# Patient Record
Sex: MALE | Race: BLACK OR AFRICAN AMERICAN | Employment: FULL TIME | URBAN - METROPOLITAN AREA
[De-identification: names, ages, dates, MRNs, and addresses within clinical notes are randomized per-mention and may not be internally consistent; named-entity substitution may affect disease eponyms.]

---

## 2017-06-26 ENCOUNTER — GENERIC CONVERSION - ENCOUNTER (OUTPATIENT)
Dept: OTHER | Facility: OTHER | Age: 57
End: 2017-06-26

## 2017-06-30 ENCOUNTER — GENERIC CONVERSION - ENCOUNTER (OUTPATIENT)
Dept: OTHER | Facility: OTHER | Age: 57
End: 2017-06-30

## 2017-07-20 ENCOUNTER — GENERIC CONVERSION - ENCOUNTER (OUTPATIENT)
Dept: OTHER | Facility: OTHER | Age: 57
End: 2017-07-20

## 2017-08-31 ENCOUNTER — APPOINTMENT (OUTPATIENT)
Dept: PHYSICAL THERAPY | Facility: MEDICAL CENTER | Age: 57
End: 2017-08-31
Payer: COMMERCIAL

## 2017-08-31 PROCEDURE — 97140 MANUAL THERAPY 1/> REGIONS: CPT

## 2017-08-31 PROCEDURE — 97161 PT EVAL LOW COMPLEX 20 MIN: CPT

## 2017-08-31 PROCEDURE — G8991 OTHER PT/OT GOAL STATUS: HCPCS | Performed by: PHYSICAL THERAPIST

## 2017-08-31 PROCEDURE — G8990 OTHER PT/OT CURRENT STATUS: HCPCS | Performed by: PHYSICAL THERAPIST

## 2017-09-05 ENCOUNTER — APPOINTMENT (OUTPATIENT)
Dept: PHYSICAL THERAPY | Facility: MEDICAL CENTER | Age: 57
End: 2017-09-05
Payer: COMMERCIAL

## 2017-09-05 PROCEDURE — 97110 THERAPEUTIC EXERCISES: CPT

## 2017-09-05 PROCEDURE — 97140 MANUAL THERAPY 1/> REGIONS: CPT

## 2017-09-07 ENCOUNTER — APPOINTMENT (OUTPATIENT)
Dept: PHYSICAL THERAPY | Facility: MEDICAL CENTER | Age: 57
End: 2017-09-07
Payer: COMMERCIAL

## 2017-09-07 PROCEDURE — 97140 MANUAL THERAPY 1/> REGIONS: CPT

## 2017-09-07 PROCEDURE — 97110 THERAPEUTIC EXERCISES: CPT

## 2017-09-11 ENCOUNTER — APPOINTMENT (OUTPATIENT)
Dept: PHYSICAL THERAPY | Facility: MEDICAL CENTER | Age: 57
End: 2017-09-11
Payer: COMMERCIAL

## 2017-09-11 PROCEDURE — 97110 THERAPEUTIC EXERCISES: CPT

## 2017-09-11 PROCEDURE — 97140 MANUAL THERAPY 1/> REGIONS: CPT

## 2017-09-14 ENCOUNTER — APPOINTMENT (OUTPATIENT)
Dept: PHYSICAL THERAPY | Facility: MEDICAL CENTER | Age: 57
End: 2017-09-14
Payer: COMMERCIAL

## 2017-09-14 PROCEDURE — 97110 THERAPEUTIC EXERCISES: CPT

## 2017-09-14 PROCEDURE — 97140 MANUAL THERAPY 1/> REGIONS: CPT

## 2017-09-18 ENCOUNTER — APPOINTMENT (OUTPATIENT)
Dept: PHYSICAL THERAPY | Facility: MEDICAL CENTER | Age: 57
End: 2017-09-18
Payer: COMMERCIAL

## 2017-09-18 PROCEDURE — 97110 THERAPEUTIC EXERCISES: CPT

## 2017-09-18 PROCEDURE — 97140 MANUAL THERAPY 1/> REGIONS: CPT

## 2017-09-21 ENCOUNTER — APPOINTMENT (OUTPATIENT)
Dept: PHYSICAL THERAPY | Facility: MEDICAL CENTER | Age: 57
End: 2017-09-21
Payer: COMMERCIAL

## 2017-09-25 ENCOUNTER — APPOINTMENT (OUTPATIENT)
Dept: PHYSICAL THERAPY | Facility: MEDICAL CENTER | Age: 57
End: 2017-09-25
Payer: COMMERCIAL

## 2017-09-28 ENCOUNTER — APPOINTMENT (OUTPATIENT)
Dept: PHYSICAL THERAPY | Facility: MEDICAL CENTER | Age: 57
End: 2017-09-28
Payer: COMMERCIAL

## 2017-10-13 ENCOUNTER — GENERIC CONVERSION - ENCOUNTER (OUTPATIENT)
Dept: OTHER | Facility: OTHER | Age: 57
End: 2017-10-13

## 2017-12-28 ENCOUNTER — GENERIC CONVERSION - ENCOUNTER (OUTPATIENT)
Dept: OTHER | Facility: OTHER | Age: 57
End: 2017-12-28

## 2017-12-28 ENCOUNTER — ALLSCRIPTS OFFICE VISIT (OUTPATIENT)
Dept: OTHER | Facility: OTHER | Age: 57
End: 2017-12-28

## 2017-12-28 ENCOUNTER — TRANSCRIBE ORDERS (OUTPATIENT)
Dept: LAB | Facility: CLINIC | Age: 57
End: 2017-12-28

## 2017-12-28 ENCOUNTER — APPOINTMENT (OUTPATIENT)
Dept: LAB | Facility: CLINIC | Age: 57
End: 2017-12-28
Payer: COMMERCIAL

## 2017-12-28 DIAGNOSIS — I10 ESSENTIAL (PRIMARY) HYPERTENSION: ICD-10-CM

## 2017-12-28 LAB
ALBUMIN SERPL BCP-MCNC: 3.7 G/DL (ref 3.5–5)
ALP SERPL-CCNC: 109 U/L (ref 46–116)
ALT SERPL W P-5'-P-CCNC: 39 U/L (ref 12–78)
ANION GAP SERPL CALCULATED.3IONS-SCNC: 7 MMOL/L (ref 4–13)
AST SERPL W P-5'-P-CCNC: 28 U/L (ref 5–45)
BASOPHILS # BLD AUTO: 0.03 THOUSANDS/ΜL (ref 0–0.1)
BASOPHILS NFR BLD AUTO: 1 % (ref 0–1)
BILIRUB SERPL-MCNC: 0.38 MG/DL (ref 0.2–1)
BUN SERPL-MCNC: 9 MG/DL (ref 5–25)
CALCIUM SERPL-MCNC: 9 MG/DL (ref 8.3–10.1)
CHLORIDE SERPL-SCNC: 105 MMOL/L (ref 100–108)
CHOLEST SERPL-MCNC: 218 MG/DL (ref 50–200)
CO2 SERPL-SCNC: 29 MMOL/L (ref 21–32)
CREAT SERPL-MCNC: 1 MG/DL (ref 0.6–1.3)
EOSINOPHIL # BLD AUTO: 0.09 THOUSAND/ΜL (ref 0–0.61)
EOSINOPHIL NFR BLD AUTO: 2 % (ref 0–6)
ERYTHROCYTE [DISTWIDTH] IN BLOOD BY AUTOMATED COUNT: 15.2 % (ref 11.6–15.1)
GFR SERPL CREATININE-BSD FRML MDRD: 96 ML/MIN/1.73SQ M
GLUCOSE P FAST SERPL-MCNC: 94 MG/DL (ref 65–99)
HCT VFR BLD AUTO: 43.8 % (ref 36.5–49.3)
HDLC SERPL-MCNC: 68 MG/DL (ref 40–60)
HGB BLD-MCNC: 14.4 G/DL (ref 12–17)
LDLC SERPL CALC-MCNC: 133 MG/DL (ref 0–100)
LYMPHOCYTES # BLD AUTO: 2.48 THOUSANDS/ΜL (ref 0.6–4.47)
LYMPHOCYTES NFR BLD AUTO: 47 % (ref 14–44)
MCH RBC QN AUTO: 28.5 PG (ref 26.8–34.3)
MCHC RBC AUTO-ENTMCNC: 32.9 G/DL (ref 31.4–37.4)
MCV RBC AUTO: 87 FL (ref 82–98)
MONOCYTES # BLD AUTO: 0.7 THOUSAND/ΜL (ref 0.17–1.22)
MONOCYTES NFR BLD AUTO: 14 % (ref 4–12)
NEUTROPHILS # BLD AUTO: 1.82 THOUSANDS/ΜL (ref 1.85–7.62)
NEUTS SEG NFR BLD AUTO: 36 % (ref 43–75)
NRBC BLD AUTO-RTO: 0 /100 WBCS
PLATELET # BLD AUTO: 239 THOUSANDS/UL (ref 149–390)
PMV BLD AUTO: 9.7 FL (ref 8.9–12.7)
POTASSIUM SERPL-SCNC: 4.5 MMOL/L (ref 3.5–5.3)
PROT SERPL-MCNC: 8 G/DL (ref 6.4–8.2)
RBC # BLD AUTO: 5.06 MILLION/UL (ref 3.88–5.62)
SODIUM SERPL-SCNC: 141 MMOL/L (ref 136–145)
TRIGL SERPL-MCNC: 86 MG/DL
TSH SERPL DL<=0.05 MIU/L-ACNC: 1.56 UIU/ML (ref 0.36–3.74)
WBC # BLD AUTO: 5.13 THOUSAND/UL (ref 4.31–10.16)

## 2017-12-28 PROCEDURE — 80053 COMPREHEN METABOLIC PANEL: CPT

## 2017-12-28 PROCEDURE — 84443 ASSAY THYROID STIM HORMONE: CPT

## 2017-12-28 PROCEDURE — 80061 LIPID PANEL: CPT

## 2017-12-28 PROCEDURE — 85025 COMPLETE CBC W/AUTO DIFF WBC: CPT

## 2017-12-28 PROCEDURE — 36415 COLL VENOUS BLD VENIPUNCTURE: CPT

## 2017-12-29 NOTE — PROGRESS NOTES
Assessment   1  Hypertension, unspecified type (401 9) (I10)   2  Upper respiratory infection (465 9) (J06 9)   3  Never a smoker   4  Obesity, morbid, BMI 40 0-49 9 (278 01) (E66 01)    Discussion/Summary      1  Hypertension - I restarted the patient on amlodipine 5 milligrams daily  He was encouraged to continue checking his blood pressure at home and to bring his cuff to his next visit to verify its accuracy  He will return in about 3 weeks for his follow-up/physical  He is encouraged to call with any problems or concerns prior to that visit  We reviewed that he should limit his salt and caffeine intake  We also discussed trying to work on weight loss as this can be helpful for blood pressure control as well  He was provided a handout for an 1800 calorie diet to try to follow  Upper respiratory infection -patient was given a script for Flonase to start using 2 sprays per nostril once a day  He should increase fluids and rest  These are usually viral and will run their course in 7-10 days  He should call his symptoms worsen or fail to improve  Morbid obesity - encouraged to work on diet as above  (Exercise is not currently an option given his tendon tears in the right ankle  )  Possible side effects of new medications were reviewed with the patient/guardian today  The treatment plan was reviewed with the patient/guardian  The patient/guardian understands and agrees with the treatment plan      Chief Complaint   Followup hypertension  c/o sinus pressure and ear discomfort  History of Present Illness   The patient presents for follow-up of essential hypertension  The patient states he has been doing poorly with his blood pressure control since the last visit  Interval Events: notes that he had high BP but amlodipine was d/c'd 2 years ago - readings at home have been a little high for the last few months so he came in to possibly restart medication      Symptoms: denies dyspnea,-- denies chest pain-- and-- denies lower extremity edema--       The patient presents with complaints of worsened impaired vision (occasional blurry with reading)  Associated symptoms include no headache  Disease Management: the patient is doing well with his blood pressure goals  Additional History: notes that he cannot exercise since tore ligaments in the right ankle about a year ago - saw Dr Veto Alba and has PT - notes that he has flat feet too - notes that even walking hurts - has limited salt and has 2 coffees a day  Monroe Skiff presents with complaints of cold symptoms starting about 1 day ago  He is currently experiencing cold symptoms  Associated symptoms include nasal congestion,-- facial pressure-- and-- ear pain, but-- no runny nose,-- no post nasal drainage,-- no sore throat,-- no dry cough,-- no wheezing,-- no shortness of breath,-- no nausea,-- no vomiting,-- no diarrhea,-- no fever-- and-- no chills  Review of Systems        Constitutional: no fever-- and-- no chills  ENT: earache-- and-- nasal congestion, but-- no sore throat  Cardiovascular: no chest pain,-- no palpitations-- and-- no extremity edema  Respiratory: no shortness of breath,-- no cough-- and-- no wheezing  Gastrointestinal: no nausea,-- no vomiting-- and-- no diarrhea  Neurological: headache, but-- as noted in HPI,-- no dizziness-- and-- no fainting  Active Problems   1  Acute recurrent frontal sinusitis (461 1) (J01 11)   2  History of esophageal reflux (V12 79) (Z87 19)   3  Hypertension, unspecified type (401 9) (I10)   4  Special screening examination for neoplasm of prostate (V76 44) (Z12 5)   5  Tinea cruris (110 3) (B35 6)    Past Medical History   1  History of Ankle pain, left (719 47) (M25 572)   2  History of Blood pressure elevated (401 9) (I10)   3  History of Blood pressure elevated (401 9) (I10)   4  History of Contact dermatitis (692 9) (L25 9)   5   History of Diphtheria-tetanus-pertussis (DTP) vaccination (V06 1) (Z23)   6  History of Diverticulitis of colon (562 11) (K57 32)   7  History of Hematuria (599 70) (R31 9)   8  Hypertension, unspecified type (401 9) (I10)   9  History of Rotator cuff tendinitis, unspecified laterality (726 10) (M75 80)   10  History of Sebaceous cyst (706 2) (L72 3)   11  History of Sinusitis (473 9) (J32 9)   12  History of Swelling of joint, wrist, right (719 03) (M25 431)    Family History   Mother    1  Family history of Brain Cancer (V16 8)   2  Family history of Colon cancer   3  Family history of Mother  At Age ___  Father    3  Family history of Acute Myocardial Infarction (V17 3)   5  Family history of Father  At Age ___  Sister    10  Family history of Family Health Status Siblings ___ Living [facility] [facility] [address]    7  Family history of Colon cancer   8  Family history of Hypertension    Social History    · Being A Social Drinker   · Never a smoker    Current Meds    1  Fluticasone Propionate 50 MCG/ACT Nasal Suspension; use 2 sprays in each nostril     once daily; Therapy: 85MTH3683 to (Evaluate:2016)  Requested for: 2016; Last     Rx:59Vrs4100 Ordered   2  Naproxen 500 MG Oral Tablet; take 1 BID PRN  Requested for: 2016; Last     Rx:2016 Ordered   3  Nystatin 017842 UNIT/GM External Powder; Apply QID prn; Therapy: 37VFF1246 to (Evaluate:2016)  Requested for: 2016; Last     Rx:2016 Ordered   4  Pantoprazole Sodium 20 MG Oral Tablet Delayed Release; TAKE 1 TABLET DAILY; Therapy: 09TBX3202 to [phone])  Requested for: 2015; Last     Rx:2015 Ordered    Allergies   1   CeleBREX CAPS    Vitals   Vital Signs    Recorded: 72GKO9743 11:01AM Recorded: 45CXF1716 10:34AM   Temperature  97 3 F   Heart Rate  68   Systolic 660 124   Diastolic 013 834   Height  5 ft 9 5 in   Weight  275 lb 2 oz   BMI Calculated  40 05   BSA Calculated  2 38     Physical Exam Constitutional      General appearance: Abnormal   morbidly obese  Head and Face      Head and face: Normal        Palpation of the face and sinuses: Abnormal   Examination of the Sinuses: right maxillary tenderness-- and-- left maxillary tenderness, but-- non-tender right frontal-- and-- non-tender left frontal       Ears, Nose, Mouth, and Throat      External inspection of ears and nose: Normal        Otoscopic examination: Tympanic membranes translucent with normal light reflex  Canals patent without erythema  Hearing: Normal        Nasal mucosa, septum, and turbinates: Abnormal  -- Swollen turbinates bilaterally (worse on the right)  Lips, teeth, and gums: Normal, good dentition  Oropharynx: Normal with no erythema, edema, exudate or lesions  Neck      Neck: Supple, symmetric, trachea midline, no masses  Thyroid: Normal, no thyromegaly  Pulmonary      Respiratory effort: No increased work of breathing or signs of respiratory distress  Auscultation of lungs: Clear to auscultation  Cardiovascular      Auscultation of heart: Normal rate and rhythm, normal S1 and S2, no murmurs  Peripheral vascular exam: Normal   Carotid: no bruit on the right-- and-- no bruit on the left  Radial: right 2+-- and-- left 2+  Posterior tibialis: right 2+-- and-- left 2+  Examination of extremities for edema and/or varicosities: Normal   no edema  Lymphatic      Palpation of lymph nodes in neck: No lymphadenopathy  Musculoskeletal      Gait and station: Abnormal  -- brace on the right ankle  Psychiatric      Mood and affect: Normal        Results/Data   PHQ-2 Adult Depression Screening 96Nhp7078 10:38AM User, Ahs      Test Name Result Flag Reference   PHQ-2 Adult Depression Score 0     Over the last two weeks, how often have you been bothered by any of the following problems?      Little interest or pleasure in doing things: Not at all - 0     Feeling down, depressed, or hopeless: Not at all - 0   PHQ-2 Adult Depression Screening Negative          Signatures    Electronically signed by : Prakash [facility]; Dec 28 2017 11:16AM EST                       (Author)     Electronically signed by : DRU Paris ; Dec 28 2017  5:39PM EST

## 2018-01-19 ENCOUNTER — ALLSCRIPTS OFFICE VISIT (OUTPATIENT)
Dept: OTHER | Facility: OTHER | Age: 58
End: 2018-01-19

## 2018-01-23 VITALS
SYSTOLIC BLOOD PRESSURE: 170 MMHG | WEIGHT: 275.13 LBS | HEART RATE: 68 BPM | TEMPERATURE: 97.3 F | HEIGHT: 70 IN | DIASTOLIC BLOOD PRESSURE: 110 MMHG | BODY MASS INDEX: 39.39 KG/M2

## 2018-01-23 VITALS
WEIGHT: 274.38 LBS | SYSTOLIC BLOOD PRESSURE: 146 MMHG | HEART RATE: 80 BPM | BODY MASS INDEX: 40.64 KG/M2 | DIASTOLIC BLOOD PRESSURE: 98 MMHG | HEIGHT: 69 IN

## 2018-01-23 NOTE — RESULT NOTES
Verified Results  (1) CBC/PLT/DIFF 93Sll9358 11:14AM Dulce Balderrama Order Number: [id number]     Test Name Result Flag Reference   WBC COUNT 5 13 Thousand/uL  4 31-10 16   RBC COUNT 5 06 Million/uL  3 88-5 62   HEMOGLOBIN 14 4 g/dL  12 0-17 0   HEMATOCRIT 43 8 %  36 5-49 3   MCV 87 fL  82-98   MCH 28 5 pg  26 8-34 3   MCHC 32 9 g/dL  31 4-37 4   RDW 15 2 % H 11 6-15 1   MPV 9 7 fL  8 9-12 7   PLATELET COUNT 446 Thousands/uL  149-390   nRBC AUTOMATED 0 /100 WBCs     NEUTROPHILS RELATIVE PERCENT 36 % L 43-75   LYMPHOCYTES RELATIVE PERCENT 47 % H 14-44   MONOCYTES RELATIVE PERCENT 14 % H 4-12   EOSINOPHILS RELATIVE PERCENT 2 %  0-6   BASOPHILS RELATIVE PERCENT 1 %  0-1   NEUTROPHILS ABSOLUTE COUNT 1 82 Thousands/? ??L L 1 85-7 62   LYMPHOCYTES ABSOLUTE COUNT 2 48 Thousands/? ??L  0 60-4 47   MONOCYTES ABSOLUTE COUNT 0 70 Thousand/? ??L  0 17-1 22   EOSINOPHILS ABSOLUTE COUNT 0 09 Thousand/? ??L  0 00-0 61   BASOPHILS ABSOLUTE COUNT 0 03 Thousands/? ??L  0 00-0 10     (1) COMPREHENSIVE METABOLIC PANEL 48DRE4095 16:44IK Donnie Youngblood    Order Number: [id number]     Test Name Result Flag Reference   SODIUM 141 mmol/L  136-145   POTASSIUM 4 5 mmol/L  3 5-5 3   CHLORIDE 105 mmol/L  100-108   CARBON DIOXIDE 29 mmol/L  21-32   ANION GAP (CALC) 7 mmol/L  4-13   BLOOD UREA NITROGEN 9 mg/dL  5-25   CREATININE 1 00 mg/dL  0 60-1 30   Standardized to IDMS reference method   CALCIUM 9 0 mg/dL  8 3-10 1   BILI, TOTAL 0 38 mg/dL  0 20-1 00   ALK PHOSPHATAS 109 U/L     ALT (SGPT) 39 U/L  12-78   Specimen collection should occur prior to Sulfasalazine and/or Sulfapyridine administration due to the potential for falsely depressed results  AST(SGOT) 28 U/L  5-45   Specimen collection should occur prior to Sulfasalazine administration due to the potential for falsely depressed results     ALBUMIN 3 7 g/dL  3 5-5 0   TOTAL PROTEIN 8 0 g/dL  6 4-8 2   eGFR 96 ml/min/1 73sq m     National Kidney Disease Education Program recommendations are as follows:  GFR calculation is accurate only with a steady state creatinine  Chronic Kidney disease less than 60 ml/min/1 73 sq  meters  Kidney failure less than 15 ml/min/1 73 sq  meters  GLUCOSE FASTING 94 mg/dL  65-99   Specimen collection should occur prior to Sulfasalazine administration due to the potential for falsely depressed results  Specimen collection should occur prior to Sulfapyridine administration due to the potential for falsely elevated results  (1) LIPID PANEL FASTING W DIRECT LDL REFLEX 13FZB1510 11:14AM Christel Jackson   [employer] Order Number: [id number]     Test Name Result Flag Reference   CHOLESTEROL 218 mg/dL H    LDL CHOLESTEROL CALCULATED 133 mg/dL H 0-100   Triglyceride:        Normal <150 mg/dl   Borderline High 150-199 mg/dl   High 200-499 mg/dl   Very High >499 mg/dl      Cholesterol:       Desirable <200 mg/dl    Borderline High 200-239 mg/dl    High >239 mg/dl      HDL Cholesterol:       High>59 mg/dL    Low <41 mg/dL      HDL Cholesterol:       High>59 mg/dL    Low <41 mg/dL      This screening LDL is a calculated result  It does not have the accuracy of the Direct Measured LDL in the monitoring of patients with hyperlipidemia and/or statin therapy  Direct Measure LDL (MOL970) must be ordered separately in these patients  TRIGLYCERIDES 86 mg/dL  <=150   Specimen collection should occur prior to N-Acetylcysteine or Metamizole administration due to the potential for falsely depressed results  HDL,DIRECT 68 mg/dL H 40-60   Specimen collection should occur prior to Metamizole administration due to the potential for falsley depressed results       (1) TSH WITH FT4 REFLEX 44Mek2777 11:14AM Christel Jackson   [employer] Order Number: [id number]     Test Name Result Flag Reference   TSH 1 560 uIU/mL  0 358-3 740   Patients undergoing fluorescein dye angiography may retain small amounts of fluorescein in the body for 48-72 hours post procedure  Samples containing fluorescein can produce falsely depressed TSH values  If the patient had this procedure,a specimen should be resubmitted post fluorescein clearance

## 2018-01-23 NOTE — PROGRESS NOTES
Assessment    1  Encounter for preventive health examination (V70 0) (Z00 00)   2  Hypertension, unspecified type (401 9) (I10)   3  Obesity, morbid, BMI 40 0-49 9 (278 01) (E66 01)   4  Hyperlipidemia (272 4) (E78 5)    Plan  Hyperlipidemia    · Atorvastatin Calcium 20 MG Oral Tablet; TAKE 1 TABLET DAILY AS DIRECTED  Hyperlipidemia, Hypertension, unspecified type    · Follow-up visit in 3 months Evaluation and Treatment  Follow-up  Status: Hold For -  Scheduling  Requested for: 50FAM7423  Hypertension, unspecified type    · AmLODIPine Besylate 10 MG Oral Tablet; TAKE 1 TABLET DAILY  Need for hepatitis C screening test    · (1) HEP C ANTIBODY; Status:Active; Requested [id number]; Obesity, morbid, BMI 40 0-49 9    · (1) LIPID PANEL FASTING W DIRECT LDL REFLEX; Status:Active; Requested  GVB:[id number]; Obesity, morbid, BMI 40 0-49 9, Special screening examination for neoplasm of prostate    · (1) PSA (SCREEN) (Dx V76 44 Screen for Prostate Cancer); Status:Active; Requested  [id number]:[id number]; Discussion/Summary  health maintenance visit Impression: healthy adult male  Currently, he eats a poor diet, has an inadequate exercise regimen and The patient was encouraged to work on improving his diet as well as starting to get more regular exercise to help him with weight loss  Prostate cancer screening: PSA was ordered  Testicular cancer screening: monthly self testicular exam was advised  Colorectal cancer screening: colonoscopy is needed every five years and The patient will be going for his colonoscopy later this year  Screening lab work includes Recent labs done last month were reviewed and follow-up labs were ordered as above  The immunizations are up to date  Advice and education were given regarding nutrition, aerobic exercise and weight loss  Patient discussion: discussed with the patient  The patient was counseled on Hepatitis C screening  The patient agrees to Hepatitis C screening       1  Hypertension  the patient will restart taking amlodipine 10 mg daily  He was encouraged to continue to check his blood pressure at home  He will return in a few weeks for a nurse blood pressure check  If he is doing well he will follow up in 3 months from now  2  Hyperlipidemia  reviewed that his ASCVD risk is 14 5%  He was encouraged to start a cholesterol-lowering medication and was started on Lipitor 20 mg daily  He should also work on decreasing his fat intake  He will recheck his lipid panel prior to his next visit in 3 months  He was encouraged to call with any problems or concerns such as muscle aches from the medication  3  Morbid obesity  The patient was encouraged to work on diet and exercise to help with weight loss as above  Referral to nutrition was declined for now( due to  schooling), but he can call if he changes his mind  Possible side effects of new medications were reviewed with the patient/guardian today  The treatment plan was reviewed with the patient/guardian  The patient/guardian understands and agrees with the treatment plan      Chief Complaint  Physical       History of Present Illness  HM, Adult Male: The patient is being seen for a health maintenance evaluation  The last health maintenance visit was 3 year(s) ago  General Health: The patient's health since the last visit is described as good  He has regular dental visits  He complains of vision problems  Vision care includes wearing glasses and having eye examinations 1 times per year  He denies hearing loss  Immunizations status: not up to date  Lifestyle:  He consumes a diverse and healthy diet  (well-balanced diet - usually drinks water or unsweetened iced tea)   He exercises regularly  (has been walking - has brace to help enable more walking despite ankle and foot problems)   He does not use tobacco  He consumes alcohol  He reports drinking a couple drinks per week  He typically drinks beer  He denies drug use  Reproductive health:  He denies erectile dysfunction  Screening: Prostate cancer screening includes last digital rectal examination 2015  Testicular cancer screening includes monthly self testicular examinations  Colorectal cancer screening includes last colonoscopy done 2013 and will be going this year for next one  Metabolic screening includes lipid profile performed 12/2017, glucose screening performed 12/2017 and thyroid function test performed 12/2017  Safety elements used: seat belt, sunscreen, smoke detector and carbon monoxide detector  Risk findings: no domestic violence  HPI: He denies any trouble with amlodipine - BP was down around 143/70s with 10 mg dose but has been taking the amlodipine only 1 a day for the last 2 days and had none for today  notes occasional gasp in breath - happens 1-2 times a week for the last 7 months - no activity related - no assoc symptoms - possibly related to anxiety such as when driving over a bridge recently - abnormality noted by GI per patient - will monitor for now since not worsening      Review of Systems    Constitutional: no fever and no chills  Cardiovascular: no chest pain and no palpitations  Respiratory: no shortness of breath, no cough and no wheezing  Gastrointestinal: no nausea, no vomiting, no diarrhea and no blood in stools  Genitourinary: no dysuria  The patient presents with complaints of arthralgias (right ankle/foot from torn ligaments)  Integumentary: no rashes  Neurological: no headache, no dizziness and no fainting  Psychiatric: no depression    The patient presents with complaints of anxiety (once going over a bridge)  Endocrine: no erectile dysfunction  Hematologic/Lymphatic: no tendency for easy bleeding and no tendency for easy bruising  Active Problems    1  History of esophageal reflux (V12 79) (Z87 19)   2  Hypertension, unspecified type (401 9) (I10)   3   Obesity, morbid, BMI 40 0-49 9 (278 01) (E66 01)   4  Special screening examination for neoplasm of prostate (V76 44) (Z12 5)   5  Tinea cruris (110 3) (B35 6)    Past Medical History    · History of Acute recurrent frontal sinusitis (461 1) (J01 11)   · History of Ankle pain, left (719 47) (M25 572)   · History of Blood pressure elevated (401 9) (I10)   · History of Blood pressure elevated (401 9) (I10)   · History of Contact dermatitis (692 9) (L25 9)   · History of Diphtheria-tetanus-pertussis (DTP) vaccination (V06 1) (Z23)   · History of Diverticulitis of colon (562 11) (K57 32)   · History of Hematuria (599 70) (R31 9)   · History of upper respiratory infection (V12 09) (Z87 09)   · Hypertension, unspecified type (401 9) (I10)   · History of Rotator cuff tendinitis, unspecified laterality (726 10) (M75 80)   · History of Sebaceous cyst (706 2) (L72 3)   · History of Sinusitis (473 9) (J32 9)   · History of Swelling of joint, wrist, right (719 03) (M25 431)    Surgical History    · History of Complete Colonoscopy    Family History  Mother    · Family history of Brain Cancer (V16 8)   · Family history of Colon cancer   · Family history of Mother  At Age ___  Father    · Family history of Acute Myocardial Infarction (V17 3)   · Family history of Father  At Age ___  Sister    · Family history of Family Health Status Siblings ___ Living [facility] [address]    · Family history of Colon cancer   · Family history of Hypertension    Social History    · Being A Social Drinker   · few a week   · Never a smoker    Current Meds   1  AmLODIPine Besylate 5 MG Oral Tablet; Take one tablet once daily; Therapy: 30GBS7262 to (Evaluate:2018)  Requested for: 44Kra9274; Last   Rx:99Bvj0636 Ordered   2  Fluticasone Propionate 50 MCG/ACT Nasal Suspension; use 2 sprays in each nostril   once daily; Therapy: 54KOA7951 to 763 2200)  Requested for: 26KXZ1903; Last   Rx:23Odj3065 Ordered   3   Naproxen 500 MG Oral Tablet; take 1 BID PRN  Requested for: 18Apr2016; Last   Rx:18Apr2016 Ordered   4  Nystatin 957654 UNIT/GM External Powder; Apply QID prn; Therapy: 36FZY2552 to (Evaluate:15Oct2016)  Requested for: 18Apr2016; Last   Rx:18Apr2016 Ordered   5  Pantoprazole Sodium 20 MG Oral Tablet Delayed Release; TAKE 1 TABLET DAILY; Therapy: 83BCF5155 to 686 0886)  Requested for: 20Mar2015; Last   Rx:20Mar2015 Ordered    Allergies    1  CeleBREX CAPS    Vitals   Recorded: 32ZHK0137 09:12AM Recorded: 95KBL5630 08:16AM   Heart Rate  80   Systolic 635 226   Diastolic 98 98   Height  5 ft 9 2 in   Weight  274 lb 6 oz   BMI Calculated  40 28   BSA Calculated  2 37     Physical Exam    Constitutional   General appearance: Abnormal   morbidly obese  Head and Face   Head and face: Normal     Eyes   Conjunctiva and lids: No erythema, swelling or discharge  Pupils and irises: Equal, round, reactive to light  Ears, Nose, Mouth, and Throat   External inspection of ears and nose: Normal     Otoscopic examination: Tympanic membranes translucent with normal light reflex  Canals patent without erythema  Hearing: Normal     Nasal mucosa, septum, and turbinates: Normal without edema or erythema  Lips, teeth, and gums: Normal, good dentition  Oropharynx: Normal with no erythema, edema, exudate or lesions  Neck   Neck: Supple, symmetric, trachea midline, no masses  Thyroid: Normal, no thyromegaly  Pulmonary   Respiratory effort: No increased work of breathing or signs of respiratory distress  Auscultation of lungs: Clear to auscultation  Cardiovascular   Auscultation of heart: Normal rate and rhythm, normal S1 and S2, no murmurs  Peripheral vascular exam: Normal   Carotid: no bruit on the right and no bruit on the left  Radial: right 2+ and left 2+  Posterior tibialis: right 2+ and left 2+  Examination of extremities for edema and/or varicosities: Normal   no edema  Abdomen   Abdomen: Non-tender, no masses    The abdomen was obese  Liver and spleen: No hepatomegaly or splenomegaly  Genitourinary   Digital rectal exam of prostate: Normal size, no masses  The prostate was normal    Lymphatic   Palpation of lymph nodes in neck: No lymphadenopathy  Musculoskeletal   Gait and station: Normal     Muscle strength/tone: Normal   Motor Strength Findings: normal upper extremity strength and normal lower extremity strength  Skin   Skin and subcutaneous tissue: Normal without rashes or lesions  Neurologic   Sensation: No sensory loss  Sensory exam: intact to light touch     Psychiatric   Mood and affect: Normal        Signatures   Electronically signed by : HOLLEY Higginbotham; Jan 19 2018  9:13AM EST                       (Author)    Electronically signed by : DRU Johnson ; Jan 19 2018  4:11PM EST

## 2018-02-02 ENCOUNTER — OFFICE VISIT (OUTPATIENT)
Dept: FAMILY MEDICINE CLINIC | Facility: CLINIC | Age: 58
End: 2018-02-02

## 2018-02-02 VITALS — SYSTOLIC BLOOD PRESSURE: 138 MMHG | DIASTOLIC BLOOD PRESSURE: 86 MMHG

## 2018-02-02 DIAGNOSIS — I10 HYPERTENSION, UNSPECIFIED TYPE: Primary | ICD-10-CM

## 2018-02-06 PROBLEM — E66.01 OBESITY, MORBID, BMI 40.0-49.9 (HCC): Status: ACTIVE | Noted: 2017-12-28

## 2018-02-06 PROBLEM — E78.5 HYPERLIPIDEMIA: Status: ACTIVE | Noted: 2018-01-19

## 2018-02-06 RX ORDER — AMLODIPINE BESYLATE 10 MG/1
1 TABLET ORAL DAILY
COMMUNITY
Start: 2015-02-06 | End: 2018-04-12 | Stop reason: SDUPTHER

## 2018-02-06 RX ORDER — NAPROXEN 500 MG/1
1 TABLET ORAL 2 TIMES DAILY PRN
COMMUNITY
End: 2018-10-19 | Stop reason: SDUPTHER

## 2018-02-06 RX ORDER — NYSTATIN 100000 [USP'U]/G
POWDER TOPICAL
COMMUNITY
Start: 2015-02-06 | End: 2018-09-13 | Stop reason: SDUPTHER

## 2018-02-06 RX ORDER — PANTOPRAZOLE SODIUM 20 MG/1
1 TABLET, DELAYED RELEASE ORAL DAILY
COMMUNITY
Start: 2015-01-20 | End: 2018-10-19

## 2018-02-06 RX ORDER — ATORVASTATIN CALCIUM 20 MG/1
1 TABLET, FILM COATED ORAL DAILY
COMMUNITY
Start: 2018-01-19 | End: 2018-04-12 | Stop reason: SDUPTHER

## 2018-02-06 RX ORDER — FLUTICASONE PROPIONATE 50 MCG
2 SPRAY, SUSPENSION (ML) NASAL DAILY PRN
COMMUNITY
Start: 2013-07-29

## 2018-04-11 NOTE — PROGRESS NOTES
[facility] [facility]  FAMILY PRACTICE OFFICE VISIT       NAME: Niya Greer  AGE: 62 y o  SEX: male       : 1960        MRN: [id number]    DATE: 2018  TIME: 8:27 AM    Assessment and Plan     Problem List Items Addressed This Visit     Hyperlipidemia       The patient reports he is doing well on atorvastatin 20 mg daily  He has his lab slip to recheck the lipid panel, which he plans to do next week  Relevant Medications    atorvastatin (LIPITOR) 20 mg tablet    Hypertension - Primary     This patient appears to be doing well with his blood pressure on the amlodipine 10 mg daily  He reports very good readings at home  We reviewed that today is blood pressure in the office was 4 lines foot due to his good home readings, we will continue with amlodipine 10 mg daily  He should call if his blood pressure seems to be her running above 140/90  Otherwise we will follow up in 6 months  Relevant Medications    amLODIPine (NORVASC) 10 mg tablet    Obesity, morbid, BMI 40 0-49 9 ([address] )       The patient has lost 3 lb since his last visit about 3 months ago  He has been working on eating healthier diet and exercising regularly by walking  He plans to continue improve upon this with the coming warmer weather  He was encouraged to continue with his positive changes  We will continue to monitor  GERD (gastroesophageal reflux disease)       The patient reports that he is doing well with regards to his acid reflux  He will continue with pantoprazole on an infrequent basis when needed  Hypertension  This patient appears to be doing well with his blood pressure on the amlodipine 10 mg daily  He reports very good readings at home  We reviewed that today is blood pressure in the office was 4 lines foot due to his good home readings, we will continue with amlodipine 10 mg daily  He should call if his blood pressure seems to be her running above 140/90  Otherwise we will follow up in 6 months  Hyperlipidemia    The patient reports he is doing well on atorvastatin 20 mg daily  He has his lab slip to recheck the lipid panel, which he plans to do next week  Obesity, morbid, BMI 40 0-49 9 ([facility] [address] )    The patient has lost 3 lb since his last visit about 3 months ago  He has been working on eating healthier diet and exercising regularly by walking  He plans to continue improve upon this with the coming warmer weather  He was encouraged to continue with his positive changes  We will continue to monitor  GERD (gastroesophageal reflux disease)    The patient reports that he is doing well with regards to his acid reflux  He will continue with pantoprazole on an infrequent basis when needed  Chief Complaint     Chief Complaint   Patient presents with    Hypertension       History of Present Illness   Joann Terry is a 62y o -year-old male who presents for 3 month follow-up  Patient visit, he has been taking the amlodipine 10 mg daily  States that blood pressure readings have been approximately 110/60-80s  He denies chest pain, palpitations, shortness of headaches, blurry vision, dizziness or leg swelling  He has been taking the Lipitor 20 mg daily since his visit 3 months ago  He denies myalgias  His repeat lipid panel was not done  Since his last visit, he has been working on diet and exercise  His weight has decreased 3 pounds since his last visit  A he reports that he has been working on cooking better for himself by broiling his meats and having vegetables without a lot of seasonings on them  The patient reports that he is doing well with his acid reflux  He takes Protonix about 1 or 2 times every 2 weeks  Review of Systems   Review of Systems   Constitutional: Negative for chills and fever  Respiratory: Negative for shortness of breath  Cardiovascular: Negative for chest pain, palpitations and leg swelling  Gastrointestinal:        Occasional GERD   Neurological: Negative for dizziness and headaches  Active Problem List     Patient Active Problem List   Diagnosis    Hyperlipidemia    Hypertension    Obesity, morbid, BMI 40 0-49 9 ([address] )    Tinea cruris    GERD (gastroesophageal reflux disease)         Past Medical History:  Past Medical History:   Diagnosis Date    Dermatitis, contact     last assessed    13    resolved   14     Diverticulitis of colon     last assessed   10/10/12   resolved   14    Hematuria     last assessed  Faye Arias Faye Arias 1/20/15  resolved    2/6/15    Hypertension     unspecified type    last assessed  Faye Arias Faye Arias 18    Rotator cuff tendinitis     unspecified laterality    last assessed    13   resolved   14      Sebaceous cyst     last assessed   14   resolved    2/6/15    Swelling of wrist joint     right    last assessed    2/6/15    resolved    4/6/15       Past Surgical History:  Past Surgical History:   Procedure Laterality Date    COLONOSCOPY      complete       Family History:  Family History   Problem Relation Age of Onset    Brain cancer Mother     Colon cancer Mother     Heart attack Father      acute /  at 46     Colon cancer Brother     Hypertension Brother        Social History:  Social History     Social History    Marital status: /Civil Union     Spouse name: N/A    Number of children: N/A    Years of education: N/A     Occupational History    Not on file       Social History Main Topics    Smoking status: Never Smoker    Smokeless tobacco: Never Used    Alcohol use Yes      Comment: social / few a week    Drug use: No    Sexual activity: Not on file     Other Topics Concern    Not on file     Social History Narrative    No narrative on file       Objective     Vitals:    18 0804   BP: 122/90   Pulse: 60     Wt Readings from Last 3 Encounters:   18 123 kg (271 lb 2 oz)   18 124 kg (274 lb 6 oz) 12/28/17 125 kg (275 lb 2 oz)       Physical Exam   Constitutional: He appears well-developed and well-nourished  Morbidly obese   HENT:   Head: Normocephalic and atraumatic  Neck: Neck supple  No thyromegaly present  Cardiovascular: Normal rate, regular rhythm, normal heart sounds and intact distal pulses  No murmur heard  No carotid bruits noted   Pulmonary/Chest: Effort normal and breath sounds normal    Lymphadenopathy:     He has no cervical adenopathy  Neurological: He is alert  Psychiatric: He has a normal mood and affect         Pertinent Laboratory/Diagnostic Studies:  Lab Results   Component Value Date    GLUCOSE 90 10/06/2015    BUN 9 12/28/2017    CREATININE 1 00 12/28/2017    CALCIUM 9 0 12/28/2017     12/28/2017    K 4 5 12/28/2017    CO2 29 12/28/2017     12/28/2017     Lab Results   Component Value Date    ALT 39 12/28/2017    AST 28 12/28/2017    ALKPHOS 109 12/28/2017    BILITOT 0 38 12/28/2017       Lab Results   Component Value Date    WBC 5 13 12/28/2017    HGB 14 4 12/28/2017    HCT 43 8 12/28/2017    MCV 87 12/28/2017     12/28/2017     Lab Results   Component Value Date    CHOL 218 (H) 12/28/2017     Lab Results   Component Value Date    TRIG 86 12/28/2017     Lab Results   Component Value Date    HDL 68 (H) 12/28/2017     Lab Results   Component Value Date    LDLCALC 133 (H) 12/28/2017     Results for orders placed or performed in visit on 12/28/17   CBC and differential   Result Value Ref Range    WBC 5 13 4 31 - 10 16 Thousand/uL    RBC 5 06 3 88 - 5 62 Million/uL    Hemoglobin 14 4 12 0 - 17 0 g/dL    Hematocrit 43 8 36 5 - 49 3 %    MCV 87 82 - 98 fL    MCH 28 5 26 8 - 34 3 pg    MCHC 32 9 31 4 - 37 4 g/dL    RDW 15 2 (H) 11 6 - 15 1 %    MPV 9 7 8 9 - 12 7 fL    Platelets 358 808 - 950 Thousands/uL    nRBC 0 /100 WBCs    Neutrophils Relative 36 (L) 43 - 75 %    Lymphocytes Relative 47 (H) 14 - 44 %    Monocytes Relative 14 (H) 4 - 12 %    Eosinophils Relative 2 0 - 6 %    Basophils Relative 1 0 - 1 %    Neutrophils Absolute 1 82 (L) 1 85 - 7 62 Thousands/µL    Lymphocytes Absolute 2 48 0 60 - 4 47 Thousands/µL    Monocytes Absolute 0 70 0 17 - 1 22 Thousand/µL    Eosinophils Absolute 0 09 0 00 - 0 61 Thousand/µL    Basophils Absolute 0 03 0 00 - 0 10 Thousands/µL   Comprehensive metabolic panel   Result Value Ref Range    Sodium 141 136 - 145 mmol/L    Potassium 4 5 3 5 - 5 3 mmol/L    Chloride 105 100 - 108 mmol/L    CO2 29 21 - 32 mmol/L    Anion Gap 7 4 - 13 mmol/L    BUN 9 5 - 25 mg/dL    Creatinine 1 00 0 60 - 1 30 mg/dL    Glucose, Fasting 94 65 - 99 mg/dL    Calcium 9 0 8 3 - 10 1 mg/dL    AST 28 5 - 45 U/L    ALT 39 12 - 78 U/L    Alkaline Phosphatase 109 46 - 116 U/L    Total Protein 8 0 6 4 - 8 2 g/dL    Albumin 3 7 3 5 - 5 0 g/dL    Total Bilirubin 0 38 0 20 - 1 00 mg/dL    eGFR 96 ml/min/1 73sq m   Lipid Panel with Direct LDL reflex   Result Value Ref Range    Cholesterol 218 (H) 50 - 200 mg/dL    Triglycerides 86 <=150 mg/dL    HDL, Direct 68 (H) 40 - 60 mg/dL    LDL Calculated 133 (H) 0 - 100 mg/dL   TSH, 3rd generation with T4 reflex   Result Value Ref Range    TSH 3RD GENERATON 1 560 0 358 - 3 740 uIU/mL         ALLERGIES:  Allergies   Allergen Reactions    Celecoxib Syncope       Current Medications     Current Outpatient Prescriptions   Medication Sig Dispense Refill    amLODIPine (NORVASC) 10 mg tablet Take 1 tablet (10 mg total) by mouth daily 90 tablet 1    atorvastatin (LIPITOR) 20 mg tablet Take 1 tablet (20 mg total) by mouth daily 90 tablet 1    fluticasone (FLONASE) 50 mcg/act nasal spray 2 sprays into each nostril daily      naproxen (NAPROSYN) 500 mg tablet Take 1 tablet by mouth 2 (two) times a day as needed      nystatin (MYCOSTATIN) powder Apply topically QID prn      pantoprazole (PROTONIX) 20 mg tablet Take 1 tablet by mouth daily       No current facility-administered medications for this visit            Health Maintenance     Health Maintenance   Topic Date Due    HIV SCREENING  1960    Hepatitis C Screening  1960    Depression Screening PHQ-9  04/12/2019    COLONOSCOPY  03/28/2023    DTaP,Tdap,and Td Vaccines (2 - Td) 02/06/2025    INFLUENZA VACCINE  Completed     Immunization History   Administered Date(s) Administered     Influenza (IM) Preservative Free 10/15/2016    Influenza TIV (IM) 10/27/2006, 11/26/2010, 10/20/2011, 10/10/2012, 10/08/2015, 10/06/2017    Tdap 02/06/2015       Claudia Lewis PA-C  4/12/2018 8:27 AM  [place] [facility]

## 2018-04-12 ENCOUNTER — OFFICE VISIT (OUTPATIENT)
Dept: FAMILY MEDICINE CLINIC | Facility: CLINIC | Age: 58
End: 2018-04-12
Payer: COMMERCIAL

## 2018-04-12 VITALS
BODY MASS INDEX: 38.81 KG/M2 | SYSTOLIC BLOOD PRESSURE: 122 MMHG | WEIGHT: 271.13 LBS | DIASTOLIC BLOOD PRESSURE: 90 MMHG | HEART RATE: 60 BPM | HEIGHT: 70 IN

## 2018-04-12 DIAGNOSIS — E78.5 HYPERLIPIDEMIA, UNSPECIFIED HYPERLIPIDEMIA TYPE: ICD-10-CM

## 2018-04-12 DIAGNOSIS — E66.01 OBESITY, MORBID, BMI 40.0-49.9 (HCC): ICD-10-CM

## 2018-04-12 DIAGNOSIS — I10 ESSENTIAL HYPERTENSION: Primary | ICD-10-CM

## 2018-04-12 DIAGNOSIS — K21.9 GASTROESOPHAGEAL REFLUX DISEASE, ESOPHAGITIS PRESENCE NOT SPECIFIED: ICD-10-CM

## 2018-04-12 PROCEDURE — 99214 OFFICE O/P EST MOD 30 MIN: CPT | Performed by: PHYSICIAN ASSISTANT

## 2018-04-12 PROCEDURE — 3008F BODY MASS INDEX DOCD: CPT | Performed by: PHYSICIAN ASSISTANT

## 2018-04-12 RX ORDER — AMLODIPINE BESYLATE 10 MG/1
10 TABLET ORAL DAILY
Qty: 90 TABLET | Refills: 1 | Status: SHIPPED | OUTPATIENT
Start: 2018-04-12 | End: 2018-10-09 | Stop reason: SDUPTHER

## 2018-04-12 RX ORDER — ATORVASTATIN CALCIUM 20 MG/1
20 TABLET, FILM COATED ORAL DAILY
Qty: 90 TABLET | Refills: 1 | Status: SHIPPED | OUTPATIENT
Start: 2018-04-12 | End: 2018-10-09 | Stop reason: SDUPTHER

## 2018-04-12 NOTE — ASSESSMENT & PLAN NOTE
The patient reports that he is doing well with regards to his acid reflux  He will continue with pantoprazole on an infrequent basis when needed

## 2018-04-12 NOTE — ASSESSMENT & PLAN NOTE
The patient reports he is doing well on atorvastatin 20 mg daily  He has his lab slip to recheck the lipid panel, which he plans to do next week

## 2018-04-12 NOTE — ASSESSMENT & PLAN NOTE
This patient appears to be doing well with his blood pressure on the amlodipine 10 mg daily  He reports very good readings at home  We reviewed that today is blood pressure in the office was 4 lines foot due to his good home readings, we will continue with amlodipine 10 mg daily  He should call if his blood pressure seems to be her running above 140/90  Otherwise we will follow up in 6 months

## 2018-04-12 NOTE — ASSESSMENT & PLAN NOTE
The patient has lost 3 lb since his last visit about 3 months ago  He has been working on eating healthier diet and exercising regularly by walking  He plans to continue improve upon this with the coming warmer weather  He was encouraged to continue with his positive changes  We will continue to monitor

## 2018-04-19 ENCOUNTER — APPOINTMENT (OUTPATIENT)
Dept: LAB | Facility: CLINIC | Age: 58
End: 2018-04-19
Payer: COMMERCIAL

## 2018-04-19 ENCOUNTER — TRANSCRIBE ORDERS (OUTPATIENT)
Dept: LAB | Facility: CLINIC | Age: 58
End: 2018-04-19

## 2018-04-19 DIAGNOSIS — E66.01 MORBID (SEVERE) OBESITY DUE TO EXCESS CALORIES (HCC): ICD-10-CM

## 2018-04-19 DIAGNOSIS — Z11.59 ENCOUNTER FOR SCREENING FOR OTHER VIRAL DISEASES (CODE): ICD-10-CM

## 2018-04-19 DIAGNOSIS — Z12.5 ENCOUNTER FOR SCREENING FOR MALIGNANT NEOPLASM OF PROSTATE: ICD-10-CM

## 2018-04-19 LAB
CHOLEST SERPL-MCNC: 139 MG/DL (ref 50–200)
HDLC SERPL-MCNC: 68 MG/DL (ref 40–60)
LDLC SERPL CALC-MCNC: 59 MG/DL (ref 0–100)
PSA SERPL-MCNC: 1.4 NG/ML (ref 0–4)
TRIGL SERPL-MCNC: 60 MG/DL

## 2018-04-19 PROCEDURE — 86803 HEPATITIS C AB TEST: CPT

## 2018-04-19 PROCEDURE — 36415 COLL VENOUS BLD VENIPUNCTURE: CPT

## 2018-04-19 PROCEDURE — G0103 PSA SCREENING: HCPCS

## 2018-04-19 PROCEDURE — 80061 LIPID PANEL: CPT

## 2018-04-20 LAB — HCV AB SER QL: NORMAL

## 2018-09-13 DIAGNOSIS — R21 RASH: Primary | ICD-10-CM

## 2018-09-13 RX ORDER — NYSTATIN 100000 [USP'U]/G
POWDER TOPICAL 3 TIMES DAILY
Qty: 60 G | Refills: 2 | Status: SHIPPED | OUTPATIENT
Start: 2018-09-13 | End: 2019-04-25 | Stop reason: SDUPTHER

## 2018-10-09 DIAGNOSIS — I10 ESSENTIAL HYPERTENSION: ICD-10-CM

## 2018-10-09 DIAGNOSIS — E78.5 HYPERLIPIDEMIA, UNSPECIFIED HYPERLIPIDEMIA TYPE: ICD-10-CM

## 2018-10-09 RX ORDER — ATORVASTATIN CALCIUM 20 MG/1
20 TABLET, FILM COATED ORAL DAILY
Qty: 90 TABLET | Refills: 1 | Status: SHIPPED | OUTPATIENT
Start: 2018-10-09 | End: 2018-10-19 | Stop reason: SDUPTHER

## 2018-10-09 RX ORDER — AMLODIPINE BESYLATE 10 MG/1
TABLET ORAL
Qty: 90 TABLET | Refills: 1 | Status: SHIPPED | OUTPATIENT
Start: 2018-10-09 | End: 2018-10-19 | Stop reason: SDUPTHER

## 2018-10-16 NOTE — PROGRESS NOTES
[facility] [facility]  FAMILY PRACTICE OFFICE VISIT       NAME: Marcie Ulloa  AGE: 62 y o  SEX: male       : 1960        MRN: [id number]    DATE: 10/19/2018  TIME: 8:28 AM    Assessment and Plan     Problem List Items Addressed This Visit     Hyperlipidemia     Well controlled on labs earlier this year  Continue atorvastatin 20 mg daily  We will continue to monitor  Relevant Medications    atorvastatin (LIPITOR) 20 mg tablet    Hypertension - Primary     Well controlled  He will continue with amlodipine 10 mg daily  Will recheck in 6 months  Relevant Medications    amLODIPine (NORVASC) 10 mg tablet    Obesity, morbid, BMI 40 0-49 9 ([facility] [address] )     Patient has lost about 8 lb since his last visit 6 months ago  He was encouraged to continue working on his diet and regular exercise  Continue to monitor  GERD (gastroesophageal reflux disease)     Controlled without pantoprazole  Encouraged to continue to control by avoiding triggering foods such as spicy items  Other Visit Diagnoses     Nonintractable headache, unspecified chronicity pattern, unspecified headache type        Relevant Medications    naproxen (NAPROSYN) 500 mg tablet    Acute midline low back pain without sciatica        Due to fall during recent vacation month ago  Improving  Given exercises to try  Call if worsens or fails to resolve  Hypertension  Well controlled  He will continue with amlodipine 10 mg daily  Will recheck in 6 months  GERD (gastroesophageal reflux disease)  Controlled without pantoprazole  Encouraged to continue to control by avoiding triggering foods such as spicy items  Hyperlipidemia  Well controlled on labs earlier this year  Continue atorvastatin 20 mg daily  We will continue to monitor  Obesity, morbid, BMI 40 0-49 9 ([facility] [address] )  Patient has lost about 8 lb since his last visit 6 months ago    He was encouraged to continue working on his diet and regular exercise  Continue to monitor  Chief Complaint     Chief Complaint   Patient presents with    Follow-up     HTN       History of Present Illness   Diya Schaefer is a 62y o -year-old male who presents for 6 month follow-up on chronic medical problems  The patient reports that current blood pressure medications include amlodipine 10 mg daily  Blood pressure readings at home are approximately 130/76  The patient denies symptoms of poor control such as chest pain, shortness of breath, leg swelling, vision changes, headaches, or dizziness  The patient reports 1 5 cups of coffee daily caffeine intake and limited salt intake  The patient continues with the atorvastatin 20 milligrams daily  Last LDL was 59 in  2018  The patient denies myalgias  Since the patent's last visit, weight has decreased 8 pounds  Diet is reported to be improved - avoiding some of wife's food  Exercise was occurring regularly with biking but has decreased since the weather just recently got colder  The patient reports that GERD has been well-controlled without the pantoprazole 20 mg daily  He notes that he had a fall off the ship the last month  He notes that he had x-rays that did not show any fractures  He was given muscle relaxers which he is able to use when needed  He notes that has been improving but still bothers him occasionally with certain motions  It does not radiate into his legs and does not cause any incontinence of the bladder or bowels  He uses naproxen occasionally for headaches from allergies and notes that he needs a refill as they   Review of Systems   Review of Systems   Respiratory: Negative for shortness of breath  Cardiovascular: Negative for chest pain, palpitations and leg swelling  Musculoskeletal: Positive for back pain  Neurological: Positive for headaches (occasional from allergies)  Negative for dizziness         Active Problem List Patient Active Problem List   Diagnosis    Hyperlipidemia    Hypertension    Obesity, morbid, BMI 40 0-49 9 ([address] )    Tinea cruris    GERD (gastroesophageal reflux disease)         Past Medical History:  Past Medical History:   Diagnosis Date    Dermatitis, contact     last assessed    13    resolved   14     Diverticulitis of colon     last assessed   10/10/12   resolved   14    Hematuria     last assessed  Shelvy Mingle Shelvy Mingle 1/20/15  resolved    2/6/15    Hypertension     unspecified type    last assessed  Shelvy Mingle Shelvy Mingle 18    Rotator cuff tendinitis     unspecified laterality    last assessed    13   resolved   14      Sebaceous cyst     last assessed   14   resolved    2/6/15    Swelling of wrist joint     right    last assessed    2/6/15    resolved    4/6/15       Past Surgical History:  Past Surgical History:   Procedure Laterality Date    COLONOSCOPY      complete       Family History:  Family History   Problem Relation Age of Onset    Brain cancer Mother     Colon cancer Mother     Heart attack Father         acute /  at 46     Colon cancer Brother     Hypertension Brother        Social History:  Social History     Social History    Marital status: /Civil Union     Spouse name: N/A    Number of children: N/A    Years of education: N/A     Occupational History    Not on file       Social History Main Topics    Smoking status: Never Smoker    Smokeless tobacco: Never Used    Alcohol use Yes      Comment: social / few a week    Drug use: No    Sexual activity: Not on file     Other Topics Concern    Not on file     Social History Narrative    No narrative on file       Objective     Vitals:    10/19/18 0759 10/19/18 0824   BP: 148/82 130/88   BP Location: Left arm    Patient Position: Sitting    Cuff Size: Large    Pulse: 82    SpO2: 98%    Weight: 119 kg (263 lb)    Height: 5' 9 5" (1 765 m)      Wt Readings from Last 3 Encounters:   10/19/18 119 kg (263 lb)   04/12/18 123 kg (271 lb 2 oz)   01/19/18 124 kg (274 lb 6 oz)       Physical Exam   Constitutional: He appears well-developed and well-nourished  HENT:   Head: Normocephalic and atraumatic  Neck: Neck supple  No thyromegaly present  Cardiovascular: Normal rate, regular rhythm, normal heart sounds and intact distal pulses  No murmur heard  No carotid bruits noted   Pulmonary/Chest: Effort normal and breath sounds normal    Musculoskeletal:        Lumbar back: He exhibits tenderness  He exhibits no swelling and no spasm  Mild pain in midline lumbar spine with lateral flexion bilaterally and especially extension   Lymphadenopathy:     He has no cervical adenopathy  Neurological: He is alert  He displays normal reflexes  No sensory deficit  Light touch in LE intact and equal B/L   Psychiatric: He has a normal mood and affect         Pertinent Laboratory/Diagnostic Studies:  Lab Results   Component Value Date    GLUCOSE 90 10/06/2015    BUN 9 12/28/2017    CREATININE 1 00 12/28/2017    CALCIUM 9 0 12/28/2017     12/28/2017    K 4 5 12/28/2017    CO2 29 12/28/2017     12/28/2017     Lab Results   Component Value Date    ALT 39 12/28/2017    AST 28 12/28/2017    ALKPHOS 109 12/28/2017    BILITOT 0 39 10/06/2015       Lab Results   Component Value Date    WBC 5 13 12/28/2017    HGB 14 4 12/28/2017    HCT 43 8 12/28/2017    MCV 87 12/28/2017     12/28/2017     Lab Results   Component Value Date    CHOL 209 10/06/2015     Lab Results   Component Value Date    TRIG 60 04/19/2018     Lab Results   Component Value Date    HDL 68 (H) 04/19/2018     Lab Results   Component Value Date    LDLCALC 59 04/19/2018     Results for orders placed or performed in visit on 04/19/18   Hepatitis C antibody   Result Value Ref Range    Hepatitis C Ab Non-reactive Non-reactive           ALLERGIES:  Allergies   Allergen Reactions    Celecoxib Syncope       Current Medications     Current Outpatient Prescriptions   Medication Sig Dispense Refill    amLODIPine (NORVASC) 10 mg tablet Take 1 tablet (10 mg total) by mouth daily 90 tablet 3    atorvastatin (LIPITOR) 20 mg tablet Take 1 tablet (20 mg total) by mouth daily 90 tablet 3    fluticasone (FLONASE) 50 mcg/act nasal spray 2 sprays into each nostril daily      naproxen (NAPROSYN) 500 mg tablet Take 1 tablet (500 mg total) by mouth 2 (two) times a day as needed for headaches 60 tablet 0    nystatin (MYCOSTATIN) powder Apply topically 3 (three) times a day QID prn 60 g 2     No current facility-administered medications for this visit            Health Maintenance     Health Maintenance   Topic Date Due    Depression Screening PHQ  04/12/2019    CRC Screening: Colonoscopy  03/28/2023    DTaP,Tdap,and Td Vaccines (2 - Td) 02/06/2025    INFLUENZA VACCINE  Completed     Immunization History   Administered Date(s) Administered     Influenza (IM) Preservative Free 10/15/2016    Influenza Quadrivalent 3 years and older 10/04/2018    Influenza TIV (IM) 10/27/2006, 11/26/2010, 10/20/2011, 10/10/2012, 10/08/2015, 10/06/2017    Tdap 02/06/2015       Luz Marina Hendricks PA-C  10/19/2018 8:28 AM  [facility]

## 2018-10-19 ENCOUNTER — OFFICE VISIT (OUTPATIENT)
Dept: FAMILY MEDICINE CLINIC | Facility: CLINIC | Age: 58
End: 2018-10-19
Payer: COMMERCIAL

## 2018-10-19 VITALS
BODY MASS INDEX: 37.65 KG/M2 | WEIGHT: 263 LBS | SYSTOLIC BLOOD PRESSURE: 130 MMHG | HEART RATE: 82 BPM | DIASTOLIC BLOOD PRESSURE: 88 MMHG | OXYGEN SATURATION: 98 % | HEIGHT: 70 IN

## 2018-10-19 DIAGNOSIS — E78.5 HYPERLIPIDEMIA, UNSPECIFIED HYPERLIPIDEMIA TYPE: ICD-10-CM

## 2018-10-19 DIAGNOSIS — K21.9 GASTROESOPHAGEAL REFLUX DISEASE, ESOPHAGITIS PRESENCE NOT SPECIFIED: ICD-10-CM

## 2018-10-19 DIAGNOSIS — R51.9 NONINTRACTABLE HEADACHE, UNSPECIFIED CHRONICITY PATTERN, UNSPECIFIED HEADACHE TYPE: ICD-10-CM

## 2018-10-19 DIAGNOSIS — M54.50 ACUTE MIDLINE LOW BACK PAIN WITHOUT SCIATICA: ICD-10-CM

## 2018-10-19 DIAGNOSIS — E66.01 OBESITY, MORBID, BMI 40.0-49.9 (HCC): ICD-10-CM

## 2018-10-19 DIAGNOSIS — I10 ESSENTIAL HYPERTENSION: Primary | ICD-10-CM

## 2018-10-19 PROCEDURE — 3079F DIAST BP 80-89 MM HG: CPT | Performed by: PHYSICIAN ASSISTANT

## 2018-10-19 PROCEDURE — 3075F SYST BP GE 130 - 139MM HG: CPT | Performed by: PHYSICIAN ASSISTANT

## 2018-10-19 PROCEDURE — 99214 OFFICE O/P EST MOD 30 MIN: CPT | Performed by: PHYSICIAN ASSISTANT

## 2018-10-19 RX ORDER — NAPROXEN 500 MG/1
500 TABLET ORAL 2 TIMES DAILY PRN
Qty: 60 TABLET | Refills: 0 | Status: SHIPPED | OUTPATIENT
Start: 2018-10-19 | End: 2019-04-25 | Stop reason: SDUPTHER

## 2018-10-19 RX ORDER — AMLODIPINE BESYLATE 10 MG/1
10 TABLET ORAL DAILY
Qty: 90 TABLET | Refills: 3 | Status: SHIPPED | OUTPATIENT
Start: 2018-10-19 | End: 2019-10-25 | Stop reason: SDUPTHER

## 2018-10-19 RX ORDER — ATORVASTATIN CALCIUM 20 MG/1
20 TABLET, FILM COATED ORAL DAILY
Qty: 90 TABLET | Refills: 3 | Status: SHIPPED | OUTPATIENT
Start: 2018-10-19 | End: 2019-12-31

## 2018-10-19 NOTE — ASSESSMENT & PLAN NOTE
Patient has lost about 8 lb since his last visit 6 months ago  He was encouraged to continue working on his diet and regular exercise  Continue to monitor

## 2018-10-19 NOTE — ASSESSMENT & PLAN NOTE
Well controlled on labs earlier this year  Continue atorvastatin 20 mg daily  We will continue to monitor

## 2018-10-19 NOTE — PATIENT INSTRUCTIONS
Problem List Items Addressed This Visit     Hyperlipidemia     Well controlled on labs earlier this year  Continue atorvastatin 20 mg daily  We will continue to monitor  Relevant Medications    atorvastatin (LIPITOR) 20 mg tablet    Hypertension - Primary     Well controlled  He will continue with amlodipine 10 mg daily  Will recheck in 6 months  Relevant Medications    amLODIPine (NORVASC) 10 mg tablet    Obesity, morbid, BMI 40 0-49 9 ([facility] [address] )     Patient has lost about 8 lb since his last visit 6 months ago  He was encouraged to continue working on his diet and regular exercise  Continue to monitor  GERD (gastroesophageal reflux disease)     Controlled without pantoprazole  Encouraged to continue to control by avoiding triggering foods such as spicy items  Other Visit Diagnoses     Nonintractable headache, unspecified chronicity pattern, unspecified headache type        Relevant Medications    naproxen (NAPROSYN) 500 mg tablet    Acute midline low back pain without sciatica        Due to fall during recent vacation month ago  Improving  Given exercises to try  Call if worsens or fails to resolve  Lower Back Exercises   WHAT YOU NEED TO KNOW:   Lower back exercises help heal and strengthen your back muscles to prevent another injury  Ask your healthcare provider if you need to see a physical therapist for more advanced exercises  DISCHARGE INSTRUCTIONS:   Return to the emergency department if:   · You have severe pain that prevents you from moving  Contact your healthcare provider if:   · Your pain becomes worse  · You have new pain  · You have questions or concerns about your condition or care  Do lower back exercises safely:   · Do the exercises on a mat or firm surface  (not on a bed) to support your spine and prevent low back pain  · Move slowly and smoothly  Avoid fast or jerky motions  · Breathe normally  Do not hold your breath  · Stop if you feel pain  It is normal to feel some discomfort at first  Regular exercise will help decrease your discomfort over time  Lower back exercises: Your healthcare provider may recommend that you do back exercises 10 to 30 minutes each day  He may also recommend that you do exercises 1 to 3 times each day  Ask your healthcare provider which exercises are best for you and how often to do them  · Ankle pumps:  Lie on your back  Move your foot up (with your toes pointing toward your head)  Then, move your foot down (with your toes pointing away from you)  Repeat this exercise 10 times on each side  · Heel slides:  Lie on your back  Slowly bend one leg and then straighten it  Next, bend the other leg and then straighten it  Repeat 10 times on each side  · Pelvic tilt:  Lie on your back with your knees bent and feet flat on the floor  Place your arms in a relaxed position beside your body  Tighten the muscles of your abdomen and flatten your back against the floor  Hold for 5 seconds  Repeat 5 times  · Back stretch:  Lie on your back with your hands behind your head  Bend your knees and turn the lower half of your body to one side  Hold this position for 10 seconds  Repeat 3 times on each side  · Straight leg raises:  Lie on your back with one leg straight  Bend the other knee  Tighten your abdomen and then slowly lift the straight leg up about 6 to 12 inches off the floor  Hold for 1 to 5 seconds  Lower your leg slowly  Repeat 10 times on each leg  · Knee-to-chest:  Lie on your back with your knees bent and feet flat on the floor  Pull one of your knees toward your chest and hold it there for 5 seconds  Return your leg to the starting position  Lift the other knee toward your chest and hold for 5 seconds  Do this 5 times on each side  · Cat and camel:  Place your hands and knees on the floor   Arch your back upward toward the ceiling and lower your head  Round out your spine as much as you can  Hold for 5 seconds  Lift your head upward and push your chest downward toward the floor  Hold for 5 seconds  Do 3 sets or as directed  · Wall squats:  Stand with your back against a wall  Tighten the muscles of your abdomen  Slowly lower your body until your knees are bent at a 45 degree angle  Hold this position for 5 seconds  Slowly move back up to a standing position  Repeat 10 times  · Curl up:  Lie on your back with your knees bent and feet flat on the floor  Place your hands, palms down, underneath the curve in your lower back  Next, with your elbows on the floor, lift your shoulders and chest 2 to 3 inches  Keep your head in line with your shoulders  Hold this position for 5 seconds  When you can do this exercise without pain for 10 to 15 seconds, you may add a rotation  While your shoulders and chest are lifted off the ground, turn slightly to the left and hold  Repeat on the other side  · Bird dog:  Place your hands and knees on the floor  Keep your wrists directly below your shoulders and your knees directly below your hips  Pull your belly button in toward your spine  Do not flatten or arch your back  Tighten your abdominal muscles  Raise one arm straight out so that it is aligned with your head  Next, raise the leg opposite your arm  Hold this position for 15 seconds  Lower your arm and leg slowly and change sides  Do 5 sets  © 2017 [address] [employer] [employer] Information is for End User's use only and may not be sold, redistributed or otherwise used for commercial purposes  All illustrations and images included in CareNotes® are the copyrighted property of [employer]  or Mike Court[employer]  The above information is an  only  It is not intended as medical advice for individual conditions or treatments   Talk to your doctor, nurse or pharmacist before following any medical regimen to see if it is safe and effective for you

## 2018-10-19 NOTE — ASSESSMENT & PLAN NOTE
Controlled without pantoprazole  Encouraged to continue to control by avoiding triggering foods such as spicy items

## 2018-11-07 ENCOUNTER — OFFICE VISIT (OUTPATIENT)
Dept: FAMILY MEDICINE CLINIC | Facility: CLINIC | Age: 58
End: 2018-11-07
Payer: COMMERCIAL

## 2018-11-07 VITALS
HEIGHT: 70 IN | RESPIRATION RATE: 20 BRPM | SYSTOLIC BLOOD PRESSURE: 134 MMHG | BODY MASS INDEX: 38.25 KG/M2 | DIASTOLIC BLOOD PRESSURE: 82 MMHG | HEART RATE: 88 BPM | TEMPERATURE: 98.2 F | WEIGHT: 267.2 LBS

## 2018-11-07 DIAGNOSIS — I10 ESSENTIAL HYPERTENSION: ICD-10-CM

## 2018-11-07 DIAGNOSIS — J02.9 PHARYNGITIS, UNSPECIFIED ETIOLOGY: Primary | ICD-10-CM

## 2018-11-07 DIAGNOSIS — E78.5 HYPERLIPIDEMIA, UNSPECIFIED HYPERLIPIDEMIA TYPE: ICD-10-CM

## 2018-11-07 PROCEDURE — 3079F DIAST BP 80-89 MM HG: CPT | Performed by: INTERNAL MEDICINE

## 2018-11-07 PROCEDURE — 99213 OFFICE O/P EST LOW 20 MIN: CPT | Performed by: INTERNAL MEDICINE

## 2018-11-07 PROCEDURE — 1036F TOBACCO NON-USER: CPT | Performed by: INTERNAL MEDICINE

## 2018-11-07 PROCEDURE — 3075F SYST BP GE 130 - 139MM HG: CPT | Performed by: INTERNAL MEDICINE

## 2018-11-07 PROCEDURE — 3008F BODY MASS INDEX DOCD: CPT | Performed by: INTERNAL MEDICINE

## 2018-11-07 NOTE — PROGRESS NOTES
Assessment/Plan:     Diagnoses and all orders for this visit:    Pharyngitis, unspecified etiology      On exam, Liya Blakely appears to have erythema of the posterior pharynx with minor lymphadenopathy  We discussed this possibly may be viral in nature  We discussed symptomatic treatment with warm/salt water gargles as well as Cold Dima  Monitor for fevers  Otherwise no other changes were made  Subjective:      Patient ID: Az Fiore is a 62 y o  male  Liya Blakely is here today with sick symptoms  Symptoms started slightly last night but noticed it mostly this morning  He reports left sided ear discomfort along with sore throat  He notes somewhat burning  He reports no cough or fevers  His daughter had URI symptoms last week but otherwise denies any other sick contacts  The following portions of the patient's history were reviewed and updated as appropriate: allergies, past family history, past medical history, past social history, past surgical history and problem list     Review of Systems   Constitutional: Negative for chills and fever  HENT: Positive for ear pain, postnasal drip and sore throat  Negative for congestion, ear discharge, rhinorrhea, sinus pain and sinus pressure  Respiratory: Negative for cough, chest tightness and shortness of breath  Cardiovascular: Negative for chest pain, palpitations and leg swelling  Objective:      /82   Pulse 88   Temp 98 2 °F (36 8 °C)   Resp 20   Ht 5' 9 5" (1 765 m)   Wt 121 kg (267 lb 3 2 oz)   BMI 38 89 kg/m²          Physical Exam   Constitutional: He is oriented to person, place, and time  He appears well-developed and well-nourished  No distress  HENT:   Head: Normocephalic and atraumatic  Right Ear: External ear normal    Left Ear: External ear normal    Mouth/Throat: Posterior oropharyngeal erythema present  Eyes: Conjunctivae and EOM are normal  Right eye exhibits no discharge  Left eye exhibits no discharge     Neck: Normal range of motion  Cardiovascular: Normal rate, regular rhythm and normal heart sounds  No murmur heard  Pulmonary/Chest: Effort normal and breath sounds normal  No respiratory distress  He has no wheezes  Lymphadenopathy:     He has cervical adenopathy  Neurological: He is alert and oriented to person, place, and time  Skin: Skin is warm and dry  He is not diaphoretic  No erythema  Psychiatric: He has a normal mood and affect  His speech is normal and behavior is normal  Judgment and thought content normal    Vitals reviewed

## 2019-02-16 ENCOUNTER — HOSPITAL ENCOUNTER (OUTPATIENT)
Dept: RADIOLOGY | Facility: HOSPITAL | Age: 59
Discharge: HOME | End: 2019-02-16
Payer: COMMERCIAL

## 2019-02-16 ENCOUNTER — OFFICE VISIT (OUTPATIENT)
Dept: OBGYN CLINIC | Facility: HOSPITAL | Age: 59
End: 2019-02-16
Payer: COMMERCIAL

## 2019-02-16 VITALS
SYSTOLIC BLOOD PRESSURE: 131 MMHG | WEIGHT: 265 LBS | DIASTOLIC BLOOD PRESSURE: 85 MMHG | HEIGHT: 69 IN | HEART RATE: 80 BPM | BODY MASS INDEX: 39.25 KG/M2

## 2019-02-16 DIAGNOSIS — G89.29 CHRONIC MIDLINE LOW BACK PAIN, WITH SCIATICA PRESENCE UNSPECIFIED: Primary | ICD-10-CM

## 2019-02-16 DIAGNOSIS — S22.080A CLOSED WEDGE COMPRESSION FRACTURE OF ELEVENTH THORACIC VERTEBRA, INITIAL ENCOUNTER: ICD-10-CM

## 2019-02-16 DIAGNOSIS — M54.5 CHRONIC MIDLINE LOW BACK PAIN, WITH SCIATICA PRESENCE UNSPECIFIED: ICD-10-CM

## 2019-02-16 DIAGNOSIS — M54.5 CHRONIC MIDLINE LOW BACK PAIN, WITH SCIATICA PRESENCE UNSPECIFIED: Primary | ICD-10-CM

## 2019-02-16 DIAGNOSIS — M54.12 CERVICAL NEURALGIA: ICD-10-CM

## 2019-02-16 DIAGNOSIS — G89.29 CHRONIC MIDLINE LOW BACK PAIN, WITH SCIATICA PRESENCE UNSPECIFIED: ICD-10-CM

## 2019-02-16 PROCEDURE — 72100 X-RAY EXAM L-S SPINE 2/3 VWS: CPT

## 2019-02-16 PROCEDURE — 99203 OFFICE O/P NEW LOW 30 MIN: CPT | Performed by: PHYSICIAN ASSISTANT

## 2019-02-16 RX ORDER — PREDNISONE 10 MG/1
TABLET ORAL
Qty: 28 TABLET | Refills: 0 | Status: SHIPPED | OUTPATIENT
Start: 2019-02-16 | End: 2019-03-14 | Stop reason: ALTCHOICE

## 2019-02-16 NOTE — PROGRESS NOTES
Assessment/Plan   Diagnoses and all orders for this visit:    Chronic midline low back pain, with no sciatica  -     XR spine lumbar 2 or 3 views injury; Future    Closed wedge compression fracture of eleventh thoracic vertebra, initial encounter [facility])  -     Ambulatory referral to Spine & paint; Future    Cervical neuralgia  -     predniSONE 10 mg tablet; 7 tabs po day 1, 6 tabs po day 2, 5 tabs po day 3, 4 tabs po day 4, 3 tabs po day 5, 2 tabs po day 6, 1 tab po day 7, Then stop  -     Ambulatory referral to spine & pain; Future          Subjective   Patient ID: Diya Schaefer is a 62 y o  male  Vitals:    02/16/19 0800   BP: 131/85   Pulse: [de-identified]     59yo male comes in for an evaluation of chronic low back pain  He has been having pain since he fell 7 months ago  He was wheeling luggage backwards into an elevator, tripped, and fell into a sitting position  He has had no treatment so far  He c/o midline low back pain that does not radiate  At the end of the visit, he also mentions pain that radiates intermittently from the neck to the thumb and index finger with occasional tingling in these two fingers  Right side only  No injury  The following portions of the patient's history were reviewed and updated as appropriate: allergies, current medications, past family history, past medical history, past social history, past surgical history and problem list     Review of Systems  Ortho Exam  Past Medical History:   Diagnosis Date    Dermatitis, contact     last assessed    9/26/13    resolved   12/1/14     Diverticulitis of colon     last assessed   10/10/12   resolved   12/1/14    Hematuria     last assessed  Karen Andre 1/20/15  resolved    2/6/15    Hypertension     unspecified type    last assessed  Karen Andre 1/19/18    Rotator cuff tendinitis     unspecified laterality    last assessed    9/30/13   resolved   12/1/14      Sebaceous cyst     last assessed   12/1/14   resolved    2/6/15    Swelling of wrist joint     right    last assessed    2/6/15    resolved    4/6/15     Past Surgical History:   Procedure Laterality Date    COLONOSCOPY      complete     Family History   Problem Relation Age of Onset    Brain cancer Mother     Colon cancer Mother     Heart attack Father         acute /  at 50     Colon cancer Brother     Hypertension Brother      Social History     Occupational History    Not on file   Tobacco Use    Smoking status: Never Smoker    Smokeless tobacco: Never Used   Substance and Sexual Activity    Alcohol use: Yes     Comment: social / few a week    Drug use: No    Sexual activity: Not on file         Objective   Physical Exam    · Constitutional: Awake, Alert, Oriented  · Eyes: EOMI  · Psych: Mood and affect appropriate  · Heart: regular rate and rhythm  · Lungs: No audible wheezing  · Abdomen: soft  · Lymph: no lymphedema     bilateral Low back / lumbar spine:  - Appearance   Inspection of back is normal with normal lordosis and no scoliosis, erythema, ecchymosis, or rash  - Palpation   No tenderness of the midline bony landmarks, paraspinal musculature, or SI joints bilaterally  - ROM   flexion 100, extension +10, rotation 90/90, horizontal flexion 50/50  Pain with extension only  - Motor   abductor 5/5; quadraceps 5/5; hamstrings 5/5; adductors 5/5; iliopsoas 5/5, anterior tibial 5/5; gastrosoleus 5/5; EHL 5/5; peroneal posterior tibial 5/5; EHL 5/5  - Sensation   No numbness in all LE dermatomes bilaterally  - DTRs   DTRs 2+ and symmetric bilaterally  and No clonus  - Special Tests   SLR negative         right Neck / CSpine:  - Appearance   No rash, erythema, or ecchymosis  - Palpation   No tenderness to palpation of the midline bony landmarks, paraspinal musculature, Trapezius  - ROM   Full active ROM    +pain with flexion to the right  - Motor   5/5 in all UE myotomes  - Sensation   Diminished in the right thumb and index  - Special Tests   Spurlings positive on the right       right Shoulder:  - Appearance   No rash, erythema, or ecchymosis  - Palpation   No tenderness to palpation of the clavicle, AC joint, biceps tendon, scapula, or proximal humerus   - ROM   Full and pain-free AROM   - Motor   5/5 motor in all planes  - Special tests  - Negative impingement sign, empty can test      I have personally reviewed pertinent films in PACS and my interpretation is T11 compression fracture

## 2019-03-14 ENCOUNTER — CONSULT (OUTPATIENT)
Dept: PAIN MEDICINE | Facility: MEDICAL CENTER | Age: 59
End: 2019-03-14
Payer: COMMERCIAL

## 2019-03-14 VITALS
SYSTOLIC BLOOD PRESSURE: 136 MMHG | RESPIRATION RATE: 14 BRPM | HEART RATE: 82 BPM | DIASTOLIC BLOOD PRESSURE: 87 MMHG | BODY MASS INDEX: 38.98 KG/M2 | WEIGHT: 263.2 LBS | HEIGHT: 69 IN

## 2019-03-14 DIAGNOSIS — M77.8 TENDINITIS OF RIGHT SHOULDER: ICD-10-CM

## 2019-03-14 DIAGNOSIS — G89.29 CHRONIC BILATERAL LOW BACK PAIN WITHOUT SCIATICA: ICD-10-CM

## 2019-03-14 DIAGNOSIS — M54.50 CHRONIC BILATERAL LOW BACK PAIN WITHOUT SCIATICA: ICD-10-CM

## 2019-03-14 DIAGNOSIS — M47.816 LUMBAR SPONDYLOSIS: Primary | ICD-10-CM

## 2019-03-14 PROCEDURE — 99244 OFF/OP CNSLTJ NEW/EST MOD 40: CPT | Performed by: PHYSICAL MEDICINE & REHABILITATION

## 2019-03-14 NOTE — PROGRESS NOTES
Assessment  1  Lumbar spondylosis    2  Chronic bilateral low back pain without sciatica    3  Tendinitis of right shoulder        Plan  1  At this time the patient is having no axial back pain over the thoracic compression fracture region  2  We will schedule the patient for bilateral L3-5 medial branch nerve blocks with intention of moving forward towards radiofrequency ablation if there is an appropriate diagnostic response  The initial blocks will be performed with 2% lidocaine and if an appropriate response is obtained upon review of the patient's pain diary, a confirmatory block will be scheduled with 0 5% bupivacaine  In the office today, we reviewed the nature of facet joint pathology in depth using a spine model  We discussed the approach we would use for the injections and provided literature for home review  The patient understands the risks associated with the procedure including bleeding, infection, tissue injury, and allergic reaction and provided verbal informed consent in the office today  3  Initiate physical therapy for his shoulder pain which is likely related to tendinitis or bursitis, could consider subacromial subdeltoid bursa injection once he continues physical therapy if he continues to have symptoms       My impressions and treatment recommendations were discussed in detail with the patient who verbalized understanding and had no further questions  Discharge instructions were provided  I personally saw and examined the patient and I agree with the above discussed plan of care  Orders Placed This Encounter   Procedures    FL spine and pain procedure     Standing Status:   Future     Standing Expiration Date:   3/14/2020     Order Specific Question:   Reason for Exam:     Answer:   (B) L3-5 MBB#1     Order Specific Question:   Anticoagulant hold needed?      Answer:   no    Ambulatory referral to Physical Therapy     Standing Status:   Future     Standing Expiration Date: 9/14/2019     Referral Priority:   Routine     Referral Type:   Physical Therapy     Referral Reason:   Specialty Services Required     Requested Specialty:   Physical Therapy     Number of Visits Requested:   1     Expiration Date:   3/14/2020     No orders of the defined types were placed in this encounter  History of Present Illness    Shae Yeung is a 62 y o  male sent from [facility] for consultation regarding chronic back pain and right shoulder pain  The patient has been experiencing pain since he fell in September of 2018  He describes moderate to severe intensity pain rated as a 9/10 at its worst which is constant without any typical pattern  He characterizes the pain as sharp, shooting, numbness, pins and needles, and throbbing sensation  He localizes the pain to his lower lumbar spine without radiation down the legs  He also has pain over the right trapezius and supraspinatus tendons  Aggravating factors include lying down, standing, bending, sitting, exercise, relaxation, and bowel movements  He is unable to determine any significant alleviating factors  Diagnostic studies include x-rays of the lumbar spine  This does reveal T11 compression fracture  No relief with TENS unit or heat or ice application  He has been using naproxen as needed without significant improvement  He did try an oral prednisone course without any improvement as well  I have personally reviewed and/or updated the patient's past medical history, past surgical history, family history, social history, current medications, allergies, and vital signs today  Review of Systems   Constitutional: Negative for fever and unexpected weight change  HENT: Negative for trouble swallowing  Eyes: Negative for visual disturbance  Respiratory: Negative for shortness of breath and wheezing  Cardiovascular: Negative for chest pain and palpitations     Gastrointestinal: Negative for constipation, diarrhea, nausea and vomiting  Endocrine: Negative for cold intolerance, heat intolerance and polydipsia  Genitourinary: Negative for difficulty urinating and frequency  Musculoskeletal: Positive for back pain (lower back is most of pain ), gait problem, joint swelling, myalgias and neck pain (left shoulder)  Negative for arthralgias  Skin: Negative for rash  Neurological: Negative for dizziness, seizures, syncope, weakness and headaches  Hematological: Does not bruise/bleed easily  Psychiatric/Behavioral: Negative for dysphoric mood  All other systems reviewed and are negative  Patient Active Problem List   Diagnosis    Hyperlipidemia    Hypertension    Obesity, morbid, BMI 40 0-49 9 ([facility] [address] )    Tinea cruris    GERD (gastroesophageal reflux disease)    Pharyngitis       Past Medical History:   Diagnosis Date    Dermatitis, contact     last assessed    13    resolved   14     Diverticulitis of colon     last assessed   10/10/12   resolved   14    Hematuria     last assessed  Harpal Ingles Harpal Ingles 1/20/15  resolved    2/6/15    Hypertension     unspecified type    last assessed  Harpal Ingles Harpal [address]    Rotator cuff tendinitis     unspecified laterality    last assessed    13   resolved   14      Sebaceous cyst     last assessed   14   resolved    2/6/15    Swelling of wrist joint     right    last assessed    2/6/15    resolved    4/6/15       Past Surgical History:   Procedure Laterality Date    COLONOSCOPY      complete       Family History   Problem Relation Age of Onset    Brain cancer Mother     Colon cancer Mother     Heart attack Father         acute /  at 50     Colon cancer Brother     Hypertension Brother        Social History     Occupational History    Not on file   Tobacco Use    Smoking status: Never Smoker    Smokeless tobacco: Never Used   Substance and Sexual Activity    Alcohol use: Yes     Comment: social / few a week    Drug use: No    Sexual activity: Not on file       Current Outpatient Medications on File Prior to Visit   Medication Sig    amLODIPine (NORVASC) 10 mg tablet Take 1 tablet (10 mg total) by mouth daily    atorvastatin (LIPITOR) 20 mg tablet Take 1 tablet (20 mg total) by mouth daily    fluticasone (FLONASE) 50 mcg/act nasal spray 2 sprays into each nostril daily    naproxen (NAPROSYN) 500 mg tablet Take 1 tablet (500 mg total) by mouth 2 (two) times a day as needed for headaches    nystatin (MYCOSTATIN) powder Apply topically 3 (three) times a day QID prn    [DISCONTINUED] predniSONE 10 mg tablet 7 tabs po day 1, 6 tabs po day 2, 5 tabs po day 3, 4 tabs po day 4, 3 tabs po day 5, 2 tabs po day 6, 1 tab po day 7, Then stop     No current facility-administered medications on file prior to visit  Allergies   Allergen Reactions    Celecoxib Syncope       Physical Exam    /87   Pulse 82   Resp 14   Ht 5' 9" (1 753 m)   Wt 119 kg (263 lb 3 2 oz)   BMI 38 87 kg/m²     General: Well-developed, well-nourished individual in no acute distress  Mental: Appropriate mood and affect  Grossly oriented with coherent speech and thought processing   Neuro:  Cranial nerves: Cranial nerve function is grossly intact bilaterally   Strength: Bilateral lower extremity strength is normal and symmetric   No atrophy or tone abnormalities noted   Reflexes: Bilateral lower extremity muscle stretch reflexes are physiologic and symmetric   No ankle clonus is noted   Sensation: No loss of sensation is noted   SLR/Foraminal Compression Maneuvers: Straight leg raising in the sitting position is negative for radicular pain   Gait:  Gait/gross motor: Gait is normal  Station is normal  Toe walking, heel walking  are normal     Musculoskeletal:  Palpation: Inspection and palpation of the spine and extremities are unremarkable except for tenderness to palpation over the lower lumbar spine    No tenderness over the lower thoracic region  Spine: Normal pain-free range of motion except for significant limitation in range of motion with lumbar extension and rotation both of which reproduce his axial back pain complaints  No gross axial skeletal deformities   Shoulder:  Right shoulder range of motion is limited in abduction and flexion  He has pain with Neer and Romano testing  Tenderness over the right trapezius and right supraspinatus tendons  Skin: Skin inspection grossly negative for erythema, breakdown, or concerning lesions in affected area   Lymph: No lymphadenopathy is appreciated in the involved extremity   Vessels: No lower extremity edema   Lungs: Breathing is comfortable and regular  No dyspnea noted during examination   Eyes: Visual field grossly intact to confrontation  No redness appreciated  ENT: No craniofacial deformities or asymmetry  No neck masses appreciated  ImagingStudy Result     LUMBAR SPINE     INDICATION:   M54 5: Low back pain  G89 29: Other chronic pain  Low back pain for several weeks      COMPARISON:  MRI lumbar spine August 28, 2007     VIEWS:  XR SPINE LUMBAR 2 OR 3 VIEWS INJURY  Images: 2     FINDINGS:  There is loss in the axial height of the T11 vertebral body along its superior endplate, new from the prior study  Remaining osseous structures intact      Alignment is unremarkable      Transition vertebra at the lumbosacral junction with incomplete sacralization of L5 on the left  Disc space narrowing L4-L5 and L5-S1    Facet hypertrophic changes L3-L4, L4-L5 and L5-S1      The pedicles appear intact      Soft tissues are unremarkable      IMPRESSION:  Age-indeterminate compression fracture T11      Progressive, mild degenerative changes      Immediate reading was not called, given the findings were appreciated by the orthopedic surgeon at time of the examination      Workstation performed: [id number]

## 2019-03-14 NOTE — LETTER
March 14, 2019     Megan P[facility]rez [facility] [address] [facility]    Patient: Shae Yeung   YOB: 1960   Date of Visit: 3/14/2019       Dear HOLLEY Cobb:    Thank you for referring Shae Yeung to me for evaluation  Below are my notes for this consultation  If you have questions, please do not hesitate to call me  I look forward to following your patient along with you  Sincerely,        Truong Burgos DO        CC: No Recipients  Truong Burgos DO  3/14/2019  9:42 AM  Sign at close encounter  Assessment  1  Lumbar spondylosis    2  Chronic bilateral low back pain without sciatica    3  Tendinitis of right shoulder        Plan  1  At this time the patient is having no axial back pain over the thoracic compression fracture region  2  We will schedule the patient for bilateral L3-5 medial branch nerve blocks with intention of moving forward towards radiofrequency ablation if there is an appropriate diagnostic response  The initial blocks will be performed with 2% lidocaine and if an appropriate response is obtained upon review of the patient's pain diary, a confirmatory block will be scheduled with 0 5% bupivacaine  In the office today, we reviewed the nature of facet joint pathology in depth using a spine model  We discussed the approach we would use for the injections and provided literature for home review  The patient understands the risks associated with the procedure including bleeding, infection, tissue injury, and allergic reaction and provided verbal informed consent in the office today  3  Initiate physical therapy for his shoulder pain which is likely related to tendinitis or bursitis, could consider subacromial subdeltoid bursa injection once he continues physical therapy if he continues to have symptoms       My impressions and treatment recommendations were discussed in detail with the patient who verbalized understanding and had no further questions  Discharge instructions were provided  I personally saw and examined the patient and I agree with the above discussed plan of care  Orders Placed This Encounter   Procedures    FL spine and pain procedure     Standing Status:   Future     Standing Expiration Date:   3/14/2020     Order Specific Question:   Reason for Exam:     Answer:   (B) L3-5 MBB#1     Order Specific Question:   Anticoagulant hold needed? Answer:   no    Ambulatory referral to Physical Therapy     Standing Status:   Future     Standing Expiration Date:   9/14/2019     Referral Priority:   Routine     Referral Type:   Physical Therapy     Referral Reason:   Specialty Services Required     Requested Specialty:   Physical Therapy     Number of Visits Requested:   1     Expiration Date:   3/14/2020     No orders of the defined types were placed in this encounter  History of Present Illness    Diya Schaefer is a 62 y o  male sent from [facility] for consultation regarding chronic back pain and right shoulder pain  The patient has been experiencing pain since he fell in September of 2018  He describes moderate to severe intensity pain rated as a 9/10 at its worst which is constant without any typical pattern  He characterizes the pain as sharp, shooting, numbness, pins and needles, and throbbing sensation  He localizes the pain to his lower lumbar spine without radiation down the legs  He also has pain over the right trapezius and supraspinatus tendons  Aggravating factors include lying down, standing, bending, sitting, exercise, relaxation, and bowel movements  He is unable to determine any significant alleviating factors  Diagnostic studies include x-rays of the lumbar spine  This does reveal T11 compression fracture  No relief with TENS unit or heat or ice application  He has been using naproxen as needed without significant improvement  He did try an oral prednisone course without any improvement as well        I have personally reviewed and/or updated the patient's past medical history, past surgical history, family history, social history, current medications, allergies, and vital signs today  Review of Systems   Constitutional: Negative for fever and unexpected weight change  HENT: Negative for trouble swallowing  Eyes: Negative for visual disturbance  Respiratory: Negative for shortness of breath and wheezing  Cardiovascular: Negative for chest pain and palpitations  Gastrointestinal: Negative for constipation, diarrhea, nausea and vomiting  Endocrine: Negative for cold intolerance, heat intolerance and polydipsia  Genitourinary: Negative for difficulty urinating and frequency  Musculoskeletal: Positive for back pain (lower back is most of pain ), gait problem, joint swelling, myalgias and neck pain (left shoulder)  Negative for arthralgias  Skin: Negative for rash  Neurological: Negative for dizziness, seizures, syncope, weakness and headaches  Hematological: Does not bruise/bleed easily  Psychiatric/Behavioral: Negative for dysphoric mood  All other systems reviewed and are negative  Patient Active Problem List   Diagnosis    Hyperlipidemia    Hypertension    Obesity, morbid, BMI 40 0-49 9 ([facility] [address] )    Tinea cruris    GERD (gastroesophageal reflux disease)    Pharyngitis       Past Medical History:   Diagnosis Date    Dermatitis, contact     last assessed    9/26/13    resolved   12/1/14     Diverticulitis of colon     last assessed   10/10/12   resolved   12/1/14    Hematuria     last assessed  Abhishek Beyer 1/20/15  resolved    2/6/15    Hypertension     unspecified type    last assessed  Abhishek Beyer 1/19/18    Rotator cuff tendinitis     unspecified laterality    last assessed    9/30/13   resolved   12/1/14      Sebaceous cyst     last assessed   12/1/14   resolved    2/6/15    Swelling of wrist joint     right    last assessed    2/6/15    resolved    4/6/15       Past Surgical History:   Procedure Laterality Date    COLONOSCOPY      complete       Family History   Problem Relation Age of Onset    Brain cancer Mother     Colon cancer Mother     Heart attack Father         acute /  at 50     Colon cancer Brother     Hypertension Brother        Social History     Occupational History    Not on file   Tobacco Use    Smoking status: Never Smoker    Smokeless tobacco: Never Used   Substance and Sexual Activity    Alcohol use: Yes     Comment: social / few a week    Drug use: No    Sexual activity: Not on file       Current Outpatient Medications on File Prior to Visit   Medication Sig    amLODIPine (NORVASC) 10 mg tablet Take 1 tablet (10 mg total) by mouth daily    atorvastatin (LIPITOR) 20 mg tablet Take 1 tablet (20 mg total) by mouth daily    fluticasone (FLONASE) 50 mcg/act nasal spray 2 sprays into each nostril daily    naproxen (NAPROSYN) 500 mg tablet Take 1 tablet (500 mg total) by mouth 2 (two) times a day as needed for headaches    nystatin (MYCOSTATIN) powder Apply topically 3 (three) times a day QID prn    [DISCONTINUED] predniSONE 10 mg tablet 7 tabs po day 1, 6 tabs po day 2, 5 tabs po day 3, 4 tabs po day 4, 3 tabs po day 5, 2 tabs po day 6, 1 tab po day 7, Then stop     No current facility-administered medications on file prior to visit  Allergies   Allergen Reactions    Celecoxib Syncope       Physical Exam    /87   Pulse 82   Resp 14   Ht 5' 9" (1 753 m)   Wt 119 kg (263 lb 3 2 oz)   BMI 38 87 kg/m²      General: Well-developed, well-nourished individual in no acute distress  Mental: Appropriate mood and affect  Grossly oriented with coherent speech and thought processing   Neuro:  Cranial nerves: Cranial nerve function is grossly intact bilaterally   Strength: Bilateral lower extremity strength is normal and symmetric   No atrophy or tone abnormalities noted     Reflexes: Bilateral lower extremity muscle stretch reflexes are physiologic and symmetric   No ankle clonus is noted   Sensation: No loss of sensation is noted   SLR/Foraminal Compression Maneuvers: Straight leg raising in the sitting position is negative for radicular pain   Gait:  Gait/gross motor: Gait is normal  Station is normal  Toe walking, heel walking  are normal     Musculoskeletal:  Palpation: Inspection and palpation of the spine and extremities are unremarkable except for tenderness to palpation over the lower lumbar spine  No tenderness over the lower thoracic region  Spine: Normal pain-free range of motion except for significant limitation in range of motion with lumbar extension and rotation both of which reproduce his axial back pain complaints  No gross axial skeletal deformities   Shoulder:  Right shoulder range of motion is limited in abduction and flexion  He has pain with Neer and Romano testing  Tenderness over the right trapezius and right supraspinatus tendons  Skin: Skin inspection grossly negative for erythema, breakdown, or concerning lesions in affected area   Lymph: No lymphadenopathy is appreciated in the involved extremity   Vessels: No lower extremity edema   Lungs: Breathing is comfortable and regular  No dyspnea noted during examination   Eyes: Visual field grossly intact to confrontation  No redness appreciated  ENT: No craniofacial deformities or asymmetry  No neck masses appreciated  ImagingStudy Result     LUMBAR SPINE     INDICATION:   M54 5: Low back pain  G89 29: Other chronic pain  Low back pain for several weeks      COMPARISON:  MRI lumbar spine August 28, 2007     VIEWS:  XR SPINE LUMBAR 2 OR 3 VIEWS INJURY  Images: 2     FINDINGS:  There is loss in the axial height of the T11 vertebral body along its superior endplate, new from the prior study    Remaining osseous structures intact      Alignment is unremarkable      Transition vertebra at the lumbosacral junction with incomplete sacralization of L5 on the left  Disc space narrowing L4-L5 and L5-S1    Facet hypertrophic changes L3-L4, L4-L5 and L5-S1      The pedicles appear intact      Soft tissues are unremarkable      IMPRESSION:  Age-indeterminate compression fracture T11      Progressive, mild degenerative changes      Immediate reading was not called, given the findings were appreciated by the orthopedic surgeon at time of the examination      Workstation performed: [id number]

## 2019-03-25 ENCOUNTER — EVALUATION (OUTPATIENT)
Dept: PHYSICAL THERAPY | Facility: MEDICAL CENTER | Age: 59
End: 2019-03-25
Payer: COMMERCIAL

## 2019-03-25 DIAGNOSIS — M77.8 TENDINITIS OF RIGHT SHOULDER: Primary | ICD-10-CM

## 2019-03-25 PROCEDURE — 97140 MANUAL THERAPY 1/> REGIONS: CPT

## 2019-03-25 PROCEDURE — G8984 CARRY CURRENT STATUS: HCPCS

## 2019-03-25 PROCEDURE — 97162 PT EVAL MOD COMPLEX 30 MIN: CPT

## 2019-03-25 PROCEDURE — G8985 CARRY GOAL STATUS: HCPCS

## 2019-03-25 NOTE — PROGRESS NOTES
PT Evaluation     Today's date: 3/25/2019  Patient name: Ashly Coleman  : 1960  MRN: [id number]  Referring provider: Tim Oneal DO  Dx:   Encounter Diagnosis     ICD-10-CM    1  Tendinitis of right shoulder M75 81 Ambulatory referral to Physical Therapy                  Assessment  Assessment details: Pt is a 62year old RHD male who presents to PT with chronic R shoulder pain following fall back in Fall of 2018  He reports he fell directly over his R side  He reports issues with any use of his R arm with occasional numbness that extends down to his R thumb  He presents with rounded forward shoulder posture in normal sitting  Pt has global moderate tenderness to entire shoulder complex, more pronounced along subacromion  He has limited ROM and strength due to his pain  He also has significant neural tension to his R median nerve that can be provoked with segmental tension to his R UE  However his neural symptoms are relieved with contralateral rotation  He should benefit from skilled PT to help reduce inflammation, improve ROM and strength, improve his posture, improve his neural mobility, and improve his overall functioning   Thank you kindly for your referral    Impairments: abnormal or restricted ROM, activity intolerance, impaired physical strength, pain with function and poor posture   Barriers to therapy: Works schedule, needs evening hours  Understanding of Dx/Px/POC: good   Prognosis: good    Goals  STG (2-3 weeks)  1: Reduce pain 2 grades on VAS  2: Increase ROM 10 degrees  3: increase neural mobility by  25%  4: Pt reports improved sleeping patterns    LTG (4-6 weeks)  1: Improve FOTO 10-15 pts  2: pt independent with HEP  3: Pt able to reach overhead without symptoms  4: improved posture by 25%      Plan  Plan details: Initiate PT as per POC  Patient would benefit from: skilled physical therapy  Planned modality interventions: cryotherapy, low level laser therapy and thermotherapy: hydrocollator packs  Planned therapy interventions: joint mobilization, manual therapy, neuromuscular re-education, postural training, flexibility, functional ROM exercises, home exercise program, therapeutic activities, therapeutic exercise, stretching and strengthening  Frequency: 2x week  Duration in visits: 12  Duration in weeks: 6  Plan of Care beginning date: 3/25/2019  Plan of Care expiration date: 2019  Treatment plan discussed with: patient        Subjective Evaluation    History of Present Illness  Mechanism of injury: trauma  Mechanism of injury: Started last September- fell getting off cruise ship- landed on back and shoulder; symptoms have been progressively getting worse  Throbbing pain that refers down into thumb, feels like toothache  Difficulty sleeping at night, sitting for too long  No previous injuries to R UE  Occasional numbness into R thumb, usually in morning  No history of cervical issues              Not a recurrent problem   Quality of life: good    Pain  Current pain ratin  At best pain ratin  At worst pain ratin  Location: posterior superior shoulder  Quality: dull ache, radiating, throbbing and discomfort  Relieving factors: rest, relaxation and heat  Aggravating factors: lifting and overhead activity  Progression: worsening    Social Support  Lives with: spouse    Employment status: working (- [employer])  Hand dominance: right  Exercise history: bowling    Patient Goals  Patient goals for therapy: decreased pain, increased motion, return to sport/leisure activities, independence with ADLs/IADLs and increased strength          Objective     Palpation     Additional Palpation Details  Global tenderness to major areas; bicpital groove, upper trapezius, AC jt, subacromion  sharp pain with palpation to UT  Rounded forward shoulder posture    Cervical/Thoracic Screen   Cervical range of motion within normal limits  Cervical range of motion within normal limits with the following exceptions: No pain with lateral flexion  Pain with R rotation, pulling pain with L rotation  + R spurling's no pain with central compression, no change with distraction    Active Range of Motion   Left Shoulder   Flexion: 155 degrees   Abduction: 150 degrees   External rotation BTH: T2   Internal rotation BTB: T5     Right Shoulder   Flexion: 90 degrees   Abduction: 90 degrees   External rotation BTH: Active external rotation behind the head: R ear  Internal rotation BTB: T8     Strength/Myotome Testing     Left Shoulder     Planes of Motion   Flexion: 4+   Extension: 5   Abduction: 4+   External rotation at 0°: 4+   Internal rotation at 0°: 5     Isolated Muscles   Supraspinatus: 4+     Right Shoulder     Planes of Motion   Flexion: 4   Extension: 4+   Abduction: 4   External rotation at 0°: 4-   Internal rotation at 0°: 4+     Isolated Muscles   Supraspinatus: 4     Additional Strength Details  Pain in all directions    Tests     Right Shoulder   Positive crossover, Hawkin's, passive horizontal adduction and ULTT1  Negative anterior load and shift, AC shear, apprehension, drop arm, external rotation lag sign, SC joint stress, Randall's and Geraldo TOS  Additional Tests Details  ULTT median bais- based on symptoms radiating to thenar eminence  Symptoms with full ER  Symptoms with full supination wrist neutral 45 degrees ER  Symptoms with full wrist extension forearm neutral 45 degrees ER  Symptoms with wrist and forearm neutral elbow to 30 degrees flexion  Symptoms unchanged with ipsilateral rotation; symptoms improved with contralateral rotation                 Precautions: T/S DDD    Daily Treatment Diary     Manual              Cervical distraction, UT stretch             R shoulder PROM             Gentle oscillatory mobs             PNG's                              Exercise Diary              Wand overhead             Wand ER             pulley             C/S stretch             Shoulder rolls                          TB row, ext                                                                                                                                                                                          Modalities              MH             CP post                            HEP:table slides, doorway stretch, cervical lateral flexion stretch, PNG's

## 2019-03-27 ENCOUNTER — OFFICE VISIT (OUTPATIENT)
Dept: PHYSICAL THERAPY | Facility: MEDICAL CENTER | Age: 59
End: 2019-03-27
Payer: COMMERCIAL

## 2019-03-27 DIAGNOSIS — M77.8 TENDINITIS OF RIGHT SHOULDER: Primary | ICD-10-CM

## 2019-03-27 PROCEDURE — 97140 MANUAL THERAPY 1/> REGIONS: CPT

## 2019-03-27 PROCEDURE — 97010 HOT OR COLD PACKS THERAPY: CPT

## 2019-03-27 NOTE — PROGRESS NOTES
Daily Note     Today's date: 3/27/2019  Patient name: Sunitha Retana  : 1960  MRN: [id number]  Referring provider: Devin Maddox DO  Dx:   Encounter Diagnosis     ICD-10-CM    1  Tendinitis of right shoulder M75 81                   Subjective: Pt reports feeling sore today  Had a rough night sleeping  Exercises are going well, no significant strain with them  Objective: See treatment diary below      Assessment: Tolerated treatment well  Patient would benefit from continued PT Pt 15 min late for appt, held TE's, only manuals  Pt reported improved symptoms post session  Plan: Continue per plan of care     Initiate ex's NV    Precautions: T/S DDD    Daily Treatment Diary     Manual  3/27            Cervical distraction, UT stretch STM R UT            R shoulder PROM 5            Gentle oscillatory mobs             PNG's 5             20                Exercise Diary  3/27            Wand overhead TE held            Wand ER             pulley             C/S stretch             Shoulder rolls                          TB row, ext                                                                                                                                                                                          Modalities  3/27            MH             CP post                            HEP:table slides, doorway stretch, cervical lateral flexion stretch, PNG's

## 2019-04-01 ENCOUNTER — OFFICE VISIT (OUTPATIENT)
Dept: PHYSICAL THERAPY | Facility: MEDICAL CENTER | Age: 59
End: 2019-04-01
Payer: COMMERCIAL

## 2019-04-01 DIAGNOSIS — M77.8 TENDINITIS OF RIGHT SHOULDER: Primary | ICD-10-CM

## 2019-04-01 PROCEDURE — 97140 MANUAL THERAPY 1/> REGIONS: CPT

## 2019-04-01 PROCEDURE — 97010 HOT OR COLD PACKS THERAPY: CPT

## 2019-04-03 ENCOUNTER — OFFICE VISIT (OUTPATIENT)
Dept: PHYSICAL THERAPY | Facility: MEDICAL CENTER | Age: 59
End: 2019-04-03
Payer: COMMERCIAL

## 2019-04-03 DIAGNOSIS — M77.8 TENDINITIS OF RIGHT SHOULDER: Primary | ICD-10-CM

## 2019-04-03 PROCEDURE — 97140 MANUAL THERAPY 1/> REGIONS: CPT

## 2019-04-03 PROCEDURE — 97010 HOT OR COLD PACKS THERAPY: CPT

## 2019-04-05 ENCOUNTER — HOSPITAL ENCOUNTER (OUTPATIENT)
Dept: RADIOLOGY | Facility: MEDICAL CENTER | Age: 59
Discharge: HOME | End: 2019-04-05
Attending: PHYSICAL MEDICINE & REHABILITATION | Admitting: PHYSICAL MEDICINE & REHABILITATION
Payer: COMMERCIAL

## 2019-04-05 VITALS
RESPIRATION RATE: 20 BRPM | SYSTOLIC BLOOD PRESSURE: 151 MMHG | TEMPERATURE: 97.8 F | HEART RATE: 98 BPM | OXYGEN SATURATION: 99 % | DIASTOLIC BLOOD PRESSURE: 91 MMHG

## 2019-04-05 DIAGNOSIS — M47.816 LUMBAR SPONDYLOSIS: ICD-10-CM

## 2019-04-05 DIAGNOSIS — M54.50 CHRONIC BILATERAL LOW BACK PAIN WITHOUT SCIATICA: ICD-10-CM

## 2019-04-05 DIAGNOSIS — G89.29 CHRONIC BILATERAL LOW BACK PAIN WITHOUT SCIATICA: ICD-10-CM

## 2019-04-05 PROCEDURE — 64494 INJ PARAVERT F JNT L/S 2 LEV: CPT | Performed by: PHYSICAL MEDICINE & REHABILITATION

## 2019-04-05 PROCEDURE — 64493 INJ PARAVERT F JNT L/S 1 LEV: CPT | Performed by: PHYSICAL MEDICINE & REHABILITATION

## 2019-04-05 RX ADMIN — Medication 3 ML: at 09:55

## 2019-04-08 ENCOUNTER — OFFICE VISIT (OUTPATIENT)
Dept: PHYSICAL THERAPY | Facility: MEDICAL CENTER | Age: 59
End: 2019-04-08
Payer: COMMERCIAL

## 2019-04-08 DIAGNOSIS — M77.8 TENDINITIS OF RIGHT SHOULDER: Primary | ICD-10-CM

## 2019-04-08 PROCEDURE — 97010 HOT OR COLD PACKS THERAPY: CPT

## 2019-04-08 PROCEDURE — 97140 MANUAL THERAPY 1/> REGIONS: CPT

## 2019-04-08 PROCEDURE — 97110 THERAPEUTIC EXERCISES: CPT

## 2019-04-09 ENCOUNTER — TELEPHONE (OUTPATIENT)
Dept: RADIOLOGY | Facility: MEDICAL CENTER | Age: 59
End: 2019-04-09

## 2019-04-10 ENCOUNTER — OFFICE VISIT (OUTPATIENT)
Dept: PHYSICAL THERAPY | Facility: MEDICAL CENTER | Age: 59
End: 2019-04-10
Payer: COMMERCIAL

## 2019-04-10 DIAGNOSIS — M77.8 TENDINITIS OF RIGHT SHOULDER: Primary | ICD-10-CM

## 2019-04-10 PROCEDURE — 97140 MANUAL THERAPY 1/> REGIONS: CPT

## 2019-04-10 PROCEDURE — 97110 THERAPEUTIC EXERCISES: CPT

## 2019-04-15 ENCOUNTER — OFFICE VISIT (OUTPATIENT)
Dept: PHYSICAL THERAPY | Facility: MEDICAL CENTER | Age: 59
End: 2019-04-15
Payer: COMMERCIAL

## 2019-04-15 DIAGNOSIS — M77.8 TENDINITIS OF RIGHT SHOULDER: Primary | ICD-10-CM

## 2019-04-15 PROCEDURE — 97110 THERAPEUTIC EXERCISES: CPT

## 2019-04-15 PROCEDURE — 97140 MANUAL THERAPY 1/> REGIONS: CPT

## 2019-04-17 ENCOUNTER — OFFICE VISIT (OUTPATIENT)
Dept: PHYSICAL THERAPY | Facility: MEDICAL CENTER | Age: 59
End: 2019-04-17
Payer: COMMERCIAL

## 2019-04-17 DIAGNOSIS — M77.8 TENDINITIS OF RIGHT SHOULDER: Primary | ICD-10-CM

## 2019-04-17 PROCEDURE — 97140 MANUAL THERAPY 1/> REGIONS: CPT

## 2019-04-22 ENCOUNTER — APPOINTMENT (OUTPATIENT)
Dept: PHYSICAL THERAPY | Facility: MEDICAL CENTER | Age: 59
End: 2019-04-22
Payer: COMMERCIAL

## 2019-04-24 ENCOUNTER — APPOINTMENT (OUTPATIENT)
Dept: PHYSICAL THERAPY | Facility: MEDICAL CENTER | Age: 59
End: 2019-04-24
Payer: COMMERCIAL

## 2019-04-25 ENCOUNTER — OFFICE VISIT (OUTPATIENT)
Dept: FAMILY MEDICINE CLINIC | Facility: CLINIC | Age: 59
End: 2019-04-25
Payer: COMMERCIAL

## 2019-04-25 ENCOUNTER — APPOINTMENT (OUTPATIENT)
Dept: LAB | Facility: CLINIC | Age: 59
End: 2019-04-25
Payer: COMMERCIAL

## 2019-04-25 VITALS
SYSTOLIC BLOOD PRESSURE: 132 MMHG | WEIGHT: 259.13 LBS | HEIGHT: 69 IN | BODY MASS INDEX: 38.38 KG/M2 | HEART RATE: 70 BPM | OXYGEN SATURATION: 99 % | DIASTOLIC BLOOD PRESSURE: 82 MMHG

## 2019-04-25 DIAGNOSIS — E66.9 OBESITY (BMI 30-39.9): ICD-10-CM

## 2019-04-25 DIAGNOSIS — E78.5 HYPERLIPIDEMIA, UNSPECIFIED HYPERLIPIDEMIA TYPE: ICD-10-CM

## 2019-04-25 DIAGNOSIS — B35.6 TINEA CRURIS: ICD-10-CM

## 2019-04-25 DIAGNOSIS — Z12.5 SCREENING FOR PROSTATE CANCER: ICD-10-CM

## 2019-04-25 DIAGNOSIS — K21.9 GASTROESOPHAGEAL REFLUX DISEASE, ESOPHAGITIS PRESENCE NOT SPECIFIED: ICD-10-CM

## 2019-04-25 DIAGNOSIS — Z00.00 ANNUAL PHYSICAL EXAM: Primary | ICD-10-CM

## 2019-04-25 DIAGNOSIS — R73.01 IMPAIRED FASTING GLUCOSE: Primary | ICD-10-CM

## 2019-04-25 DIAGNOSIS — Z12.11 ENCOUNTER FOR SCREENING COLONOSCOPY: ICD-10-CM

## 2019-04-25 DIAGNOSIS — I10 ESSENTIAL HYPERTENSION: ICD-10-CM

## 2019-04-25 DIAGNOSIS — Z00.00 ANNUAL PHYSICAL EXAM: ICD-10-CM

## 2019-04-25 DIAGNOSIS — Z01.84 IMMUNITY TO VARICELLA DETERMINED BY SEROLOGIC TEST: ICD-10-CM

## 2019-04-25 DIAGNOSIS — R21 RASH: ICD-10-CM

## 2019-04-25 DIAGNOSIS — R51.9 NONINTRACTABLE HEADACHE, UNSPECIFIED CHRONICITY PATTERN, UNSPECIFIED HEADACHE TYPE: ICD-10-CM

## 2019-04-25 PROBLEM — J02.9 PHARYNGITIS: Status: RESOLVED | Noted: 2018-11-07 | Resolved: 2019-04-25

## 2019-04-25 LAB
ALBUMIN SERPL BCP-MCNC: 4 G/DL (ref 3.5–5)
ALP SERPL-CCNC: 108 U/L (ref 46–116)
ALT SERPL W P-5'-P-CCNC: 35 U/L (ref 12–78)
ANION GAP SERPL CALCULATED.3IONS-SCNC: 6 MMOL/L (ref 4–13)
AST SERPL W P-5'-P-CCNC: 24 U/L (ref 5–45)
BASOPHILS # BLD AUTO: 0.05 THOUSANDS/ΜL (ref 0–0.1)
BASOPHILS NFR BLD AUTO: 1 % (ref 0–1)
BILIRUB SERPL-MCNC: 0.61 MG/DL (ref 0.2–1)
BUN SERPL-MCNC: 8 MG/DL (ref 5–25)
CALCIUM SERPL-MCNC: 8.9 MG/DL (ref 8.3–10.1)
CHLORIDE SERPL-SCNC: 109 MMOL/L (ref 100–108)
CHOLEST SERPL-MCNC: 157 MG/DL (ref 50–200)
CO2 SERPL-SCNC: 28 MMOL/L (ref 21–32)
CREAT SERPL-MCNC: 1.03 MG/DL (ref 0.6–1.3)
EOSINOPHIL # BLD AUTO: 0.06 THOUSAND/ΜL (ref 0–0.61)
EOSINOPHIL NFR BLD AUTO: 1 % (ref 0–6)
ERYTHROCYTE [DISTWIDTH] IN BLOOD BY AUTOMATED COUNT: 15.8 % (ref 11.6–15.1)
GFR SERPL CREATININE-BSD FRML MDRD: 92 ML/MIN/1.73SQ M
GLUCOSE P FAST SERPL-MCNC: 101 MG/DL (ref 65–99)
HCT VFR BLD AUTO: 44.3 % (ref 36.5–49.3)
HDLC SERPL-MCNC: 73 MG/DL (ref 40–60)
HGB BLD-MCNC: 14.1 G/DL (ref 12–17)
IMM GRANULOCYTES # BLD AUTO: 0.02 THOUSAND/UL (ref 0–0.2)
IMM GRANULOCYTES NFR BLD AUTO: 0 % (ref 0–2)
LDLC SERPL CALC-MCNC: 63 MG/DL (ref 0–100)
LYMPHOCYTES # BLD AUTO: 2.12 THOUSANDS/ΜL (ref 0.6–4.47)
LYMPHOCYTES NFR BLD AUTO: 38 % (ref 14–44)
MCH RBC QN AUTO: 27.6 PG (ref 26.8–34.3)
MCHC RBC AUTO-ENTMCNC: 31.8 G/DL (ref 31.4–37.4)
MCV RBC AUTO: 87 FL (ref 82–98)
MONOCYTES # BLD AUTO: 0.51 THOUSAND/ΜL (ref 0.17–1.22)
MONOCYTES NFR BLD AUTO: 9 % (ref 4–12)
NEUTROPHILS # BLD AUTO: 2.89 THOUSANDS/ΜL (ref 1.85–7.62)
NEUTS SEG NFR BLD AUTO: 51 % (ref 43–75)
NRBC BLD AUTO-RTO: 0 /100 WBCS
PLATELET # BLD AUTO: 241 THOUSANDS/UL (ref 149–390)
PMV BLD AUTO: 9.5 FL (ref 8.9–12.7)
POTASSIUM SERPL-SCNC: 4.1 MMOL/L (ref 3.5–5.3)
PROT SERPL-MCNC: 7.7 G/DL (ref 6.4–8.2)
PSA SERPL-MCNC: 1.4 NG/ML (ref 0–4)
RBC # BLD AUTO: 5.1 MILLION/UL (ref 3.88–5.62)
SODIUM SERPL-SCNC: 143 MMOL/L (ref 136–145)
TRIGL SERPL-MCNC: 107 MG/DL
TSH SERPL DL<=0.05 MIU/L-ACNC: 1.6 UIU/ML (ref 0.36–3.74)
WBC # BLD AUTO: 5.65 THOUSAND/UL (ref 4.31–10.16)

## 2019-04-25 PROCEDURE — 80053 COMPREHEN METABOLIC PANEL: CPT | Performed by: PHYSICIAN ASSISTANT

## 2019-04-25 PROCEDURE — 85025 COMPLETE CBC W/AUTO DIFF WBC: CPT | Performed by: PHYSICIAN ASSISTANT

## 2019-04-25 PROCEDURE — 80061 LIPID PANEL: CPT

## 2019-04-25 PROCEDURE — 99396 PREV VISIT EST AGE 40-64: CPT | Performed by: PHYSICIAN ASSISTANT

## 2019-04-25 PROCEDURE — 86787 VARICELLA-ZOSTER ANTIBODY: CPT

## 2019-04-25 PROCEDURE — G0103 PSA SCREENING: HCPCS

## 2019-04-25 PROCEDURE — 84443 ASSAY THYROID STIM HORMONE: CPT | Performed by: PHYSICIAN ASSISTANT

## 2019-04-25 PROCEDURE — 36415 COLL VENOUS BLD VENIPUNCTURE: CPT | Performed by: PHYSICIAN ASSISTANT

## 2019-04-25 PROCEDURE — 83036 HEMOGLOBIN GLYCOSYLATED A1C: CPT | Performed by: PHYSICIAN ASSISTANT

## 2019-04-25 RX ORDER — NYSTATIN 100000 [USP'U]/G
POWDER TOPICAL 3 TIMES DAILY
Qty: 60 G | Refills: 2 | Status: SHIPPED | OUTPATIENT
Start: 2019-04-25 | End: 2020-05-28

## 2019-04-25 RX ORDER — NAPROXEN 500 MG/1
500 TABLET ORAL 2 TIMES DAILY PRN
Qty: 60 TABLET | Refills: 1 | Status: SHIPPED | OUTPATIENT
Start: 2019-04-25 | End: 2019-08-26 | Stop reason: SDUPTHER

## 2019-04-26 ENCOUNTER — TELEPHONE (OUTPATIENT)
Dept: FAMILY MEDICINE CLINIC | Facility: CLINIC | Age: 59
End: 2019-04-26

## 2019-04-26 LAB
EST. AVERAGE GLUCOSE BLD GHB EST-MCNC: 128 MG/DL
HBA1C MFR BLD: 6.1 % (ref 4.2–6.3)

## 2019-04-29 ENCOUNTER — TELEPHONE (OUTPATIENT)
Dept: FAMILY MEDICINE CLINIC | Facility: CLINIC | Age: 59
End: 2019-04-29

## 2019-04-29 ENCOUNTER — APPOINTMENT (OUTPATIENT)
Dept: PHYSICAL THERAPY | Facility: MEDICAL CENTER | Age: 59
End: 2019-04-29
Payer: COMMERCIAL

## 2019-04-29 DIAGNOSIS — R73.03 PREDIABETES: Primary | ICD-10-CM

## 2019-04-29 LAB — VZV IGG SER IA-ACNC: NORMAL

## 2019-04-30 ENCOUNTER — TELEPHONE (OUTPATIENT)
Dept: FAMILY MEDICINE CLINIC | Facility: CLINIC | Age: 59
End: 2019-04-30

## 2019-05-08 ENCOUNTER — HOSPITAL ENCOUNTER (OUTPATIENT)
Dept: RADIOLOGY | Facility: MEDICAL CENTER | Age: 59
Discharge: HOME | End: 2019-05-08
Attending: PHYSICAL MEDICINE & REHABILITATION | Admitting: PHYSICAL MEDICINE & REHABILITATION
Payer: COMMERCIAL

## 2019-05-08 VITALS
RESPIRATION RATE: 20 BRPM | SYSTOLIC BLOOD PRESSURE: 125 MMHG | TEMPERATURE: 97.9 F | HEART RATE: 71 BPM | OXYGEN SATURATION: 100 % | DIASTOLIC BLOOD PRESSURE: 81 MMHG

## 2019-05-08 DIAGNOSIS — M47.816 LUMBAR SPONDYLOSIS: ICD-10-CM

## 2019-05-08 PROCEDURE — 64494 INJ PARAVERT F JNT L/S 2 LEV: CPT | Performed by: PHYSICAL MEDICINE & REHABILITATION

## 2019-05-08 PROCEDURE — 64493 INJ PARAVERT F JNT L/S 1 LEV: CPT | Performed by: PHYSICAL MEDICINE & REHABILITATION

## 2019-05-08 RX ORDER — SODIUM, POTASSIUM,MAG SULFATES 17.5-3.13G
SOLUTION, RECONSTITUTED, ORAL ORAL
Refills: 0 | COMMUNITY
Start: 2019-04-26 | End: 2019-06-21 | Stop reason: ALTCHOICE

## 2019-05-08 RX ORDER — BUPIVACAINE HYDROCHLORIDE 5 MG/ML
10 INJECTION, SOLUTION EPIDURAL; INTRACAUDAL ONCE
Status: COMPLETED | OUTPATIENT
Start: 2019-05-08 | End: 2019-05-08

## 2019-05-08 RX ADMIN — BUPIVACAINE HYDROCHLORIDE 3 ML: 5 INJECTION, SOLUTION EPIDURAL; INTRACAUDAL at 10:43

## 2019-05-16 ENCOUNTER — TELEPHONE (OUTPATIENT)
Dept: RADIOLOGY | Facility: MEDICAL CENTER | Age: 59
End: 2019-05-16

## 2019-06-21 ENCOUNTER — HOSPITAL ENCOUNTER (OUTPATIENT)
Dept: RADIOLOGY | Facility: MEDICAL CENTER | Age: 59
Discharge: HOME | End: 2019-06-21
Attending: PHYSICAL MEDICINE & REHABILITATION | Admitting: PHYSICAL MEDICINE & REHABILITATION
Payer: COMMERCIAL

## 2019-06-21 VITALS
RESPIRATION RATE: 20 BRPM | HEART RATE: 61 BPM | DIASTOLIC BLOOD PRESSURE: 82 MMHG | OXYGEN SATURATION: 100 % | TEMPERATURE: 98.5 F | SYSTOLIC BLOOD PRESSURE: 125 MMHG

## 2019-06-21 DIAGNOSIS — M47.816 LUMBAR SPONDYLOSIS: ICD-10-CM

## 2019-06-21 PROCEDURE — 64635 DESTROY LUMB/SAC FACET JNT: CPT | Performed by: PHYSICAL MEDICINE & REHABILITATION

## 2019-06-21 PROCEDURE — 64636 DESTROY L/S FACET JNT ADDL: CPT | Performed by: PHYSICAL MEDICINE & REHABILITATION

## 2019-06-21 RX ORDER — BUPIVACAINE HCL/PF 2.5 MG/ML
10 VIAL (ML) INJECTION ONCE
Status: COMPLETED | OUTPATIENT
Start: 2019-06-21 | End: 2019-06-21

## 2019-06-21 RX ORDER — LIDOCAINE HYDROCHLORIDE 10 MG/ML
5 INJECTION, SOLUTION EPIDURAL; INFILTRATION; INTRACAUDAL; PERINEURAL ONCE
Status: COMPLETED | OUTPATIENT
Start: 2019-06-21 | End: 2019-06-21

## 2019-06-21 RX ADMIN — LIDOCAINE HYDROCHLORIDE 3 ML: 10 INJECTION, SOLUTION EPIDURAL; INFILTRATION; INTRACAUDAL; PERINEURAL at 08:40

## 2019-06-21 RX ADMIN — Medication 4 ML: at 08:45

## 2019-06-21 RX ADMIN — Medication 5 ML: at 08:47

## 2019-06-24 ENCOUNTER — TELEPHONE (OUTPATIENT)
Dept: RADIOLOGY | Facility: MEDICAL CENTER | Age: 59
End: 2019-06-24

## 2019-07-08 ENCOUNTER — HOSPITAL ENCOUNTER (OUTPATIENT)
Dept: RADIOLOGY | Facility: MEDICAL CENTER | Age: 59
Discharge: HOME | End: 2019-07-08
Attending: PHYSICAL MEDICINE & REHABILITATION | Admitting: PHYSICAL MEDICINE & REHABILITATION
Payer: COMMERCIAL

## 2019-07-08 ENCOUNTER — TELEPHONE (OUTPATIENT)
Dept: RADIOLOGY | Facility: MEDICAL CENTER | Age: 59
End: 2019-07-08

## 2019-07-08 VITALS
RESPIRATION RATE: 18 BRPM | HEART RATE: 55 BPM | SYSTOLIC BLOOD PRESSURE: 123 MMHG | TEMPERATURE: 98.1 F | OXYGEN SATURATION: 100 % | DIASTOLIC BLOOD PRESSURE: 79 MMHG

## 2019-07-08 DIAGNOSIS — M47.816 LUMBAR SPONDYLOSIS: ICD-10-CM

## 2019-07-08 PROCEDURE — 64635 DESTROY LUMB/SAC FACET JNT: CPT | Performed by: PHYSICAL MEDICINE & REHABILITATION

## 2019-07-08 PROCEDURE — 64636 DESTROY L/S FACET JNT ADDL: CPT | Performed by: PHYSICAL MEDICINE & REHABILITATION

## 2019-07-08 RX ORDER — ONDANSETRON 4 MG/1
4 TABLET, ORALLY DISINTEGRATING ORAL EVERY 8 HOURS PRN
Refills: 0 | COMMUNITY
Start: 2019-07-04 | End: 2019-08-13 | Stop reason: ALTCHOICE

## 2019-07-08 RX ORDER — LIDOCAINE HYDROCHLORIDE 10 MG/ML
5 INJECTION, SOLUTION EPIDURAL; INFILTRATION; INTRACAUDAL; PERINEURAL ONCE
Status: COMPLETED | OUTPATIENT
Start: 2019-07-08 | End: 2019-07-08

## 2019-07-08 RX ORDER — BUPIVACAINE HCL/PF 2.5 MG/ML
10 VIAL (ML) INJECTION ONCE
Status: COMPLETED | OUTPATIENT
Start: 2019-07-08 | End: 2019-07-08

## 2019-07-08 RX ADMIN — Medication 5 ML: at 08:42

## 2019-07-08 RX ADMIN — Medication 4 ML: at 08:38

## 2019-07-08 RX ADMIN — LIDOCAINE HYDROCHLORIDE 2 ML: 10 INJECTION, SOLUTION EPIDURAL; INFILTRATION; INTRACAUDAL; PERINEURAL at 08:31

## 2019-07-08 NOTE — DISCHARGE INSTR - LAB

## 2019-07-08 NOTE — H&P
History of Present Illness: The patient is a 62 y o  male who presents with complaints of left low back pain    Patient Active Problem List   Diagnosis    Hyperlipidemia    Hypertension    Obesity (BMI 30-39  9)    Tinea cruris    GERD (gastroesophageal reflux disease)    Lumbar spondylosis    Chronic bilateral low back pain without sciatica    Prediabetes       Past Medical History:   Diagnosis Date    Dermatitis, contact     last assessed    9/26/13    resolved   12/1/14     Diverticulitis of colon     last assessed   10/10/12   resolved   12/1/14    Hematuria     last assessed  Michell Rm Jeremíasryl Rm 1/20/15  resolved    2/6/15    Hypertension     unspecified type    last assessed  Michell Rm Jeremaísryl Rm 1/19/18    Rotator cuff tendinitis     unspecified laterality    last assessed    9/30/13   resolved   12/1/14      Sebaceous cyst     last assessed   12/1/14   resolved    2/6/15    Swelling of wrist joint     right    last assessed    2/6/15    resolved    4/6/15       Past Surgical History:   Procedure Laterality Date    COLONOSCOPY      complete         Current Outpatient Medications:     amLODIPine (NORVASC) 10 mg tablet, Take 1 tablet (10 mg total) by mouth daily, Disp: 90 tablet, Rfl: 3    atorvastatin (LIPITOR) 20 mg tablet, Take 1 tablet (20 mg total) by mouth daily, Disp: 90 tablet, Rfl: 3    fluticasone (FLONASE) 50 mcg/act nasal spray, 2 sprays into each nostril daily, Disp: , Rfl:     naproxen (NAPROSYN) 500 mg tablet, Take 1 tablet (500 mg total) by mouth 2 (two) times a day as needed for headaches, Disp: 60 tablet, Rfl: 1    nystatin (MYCOSTATIN) powder, Apply topically 3 (three) times a day QID prn (Patient taking differently: Apply topically QID prn), Disp: 60 g, Rfl: 2    ondansetron (ZOFRAN-ODT) 4 mg disintegrating tablet, Take 4 mg by mouth every 8 (eight) hours as needed, Disp: , Rfl: 0    Current Facility-Administered Medications:     bupivacaine (PF) (MARCAINE) 0 25 % injection 10 mL, 10 mL, Perineural, Once, Clerance Piano, DO    lidocaine (PF) (XYLOCAINE-MPF) 1 % injection 5 mL, 5 mL, Infiltration, Once, Clerance Piano, DO    lidocaine (PF) (XYLOCAINE-MPF) 2 % injection 4 mL, 4 mL, Perineural, Once, Clerance Piano, DO    Allergies   Allergen Reactions    Celecoxib Syncope       Physical Exam:   Vitals:    07/08/19 0810   BP: 124/80   Pulse: 62   Resp: 16   Temp: 98 1 °F (36 7 °C)   SpO2: 98%     General: Awake, Alert, Oriented x 3, Mood and affect appropriate  Respiratory: Respirations even and unlabored  Cardiovascular: Peripheral pulses intact; no edema  Musculoskeletal Exam:  Left low back pain    ASA Score: 2    Patient/Chart Verification  Patient ID Verified: Verbal  Consents Confirmed: Procedural, To be obtained in the Pre-Procedure area  H&P( within 30 days) Verified: To be obtained in the Pre-Procedure area  Allergies Reviewed: Yes  Anticoag/NSAID held?: NA  Currently on antibiotics?: No  Pregnancy denied?: NA    Assessment:   1   Lumbar spondylosis        Plan: LT L3-5 RFA

## 2019-07-09 NOTE — TELEPHONE ENCOUNTER
--APPLE--    RN s/w pt regarding previous  Per pt did well over night  Pt denied s/s of infection, or sunburn like effect  Aware that it takes 2 weeks to notice a difference in his pain level and 4-6 weeks for full pain effect to be achieved  Pt aware to medicate his pain as he always has until full pain effect achieved  Pt aware of f/u appt and appreciative of call

## 2019-08-12 ENCOUNTER — CLINICAL SUPPORT (OUTPATIENT)
Dept: FAMILY MEDICINE CLINIC | Facility: CLINIC | Age: 59
End: 2019-08-12
Payer: COMMERCIAL

## 2019-08-12 DIAGNOSIS — Z23 NEED FOR ZOSTER VACCINE: Primary | ICD-10-CM

## 2019-08-12 PROCEDURE — 90471 IMMUNIZATION ADMIN: CPT

## 2019-08-12 PROCEDURE — 90750 HZV VACC RECOMBINANT IM: CPT

## 2019-08-13 ENCOUNTER — OFFICE VISIT (OUTPATIENT)
Dept: FAMILY MEDICINE CLINIC | Facility: CLINIC | Age: 59
End: 2019-08-13
Payer: COMMERCIAL

## 2019-08-13 VITALS
DIASTOLIC BLOOD PRESSURE: 92 MMHG | SYSTOLIC BLOOD PRESSURE: 144 MMHG | TEMPERATURE: 98.3 F | BODY MASS INDEX: 37.44 KG/M2 | WEIGHT: 255 LBS | OXYGEN SATURATION: 99 % | HEART RATE: 68 BPM

## 2019-08-13 DIAGNOSIS — B35.4 TINEA CORPORIS: Primary | ICD-10-CM

## 2019-08-13 PROCEDURE — 99213 OFFICE O/P EST LOW 20 MIN: CPT | Performed by: FAMILY MEDICINE

## 2019-08-13 RX ORDER — CLOTRIMAZOLE AND BETAMETHASONE DIPROPIONATE 10; .64 MG/G; MG/G
CREAM TOPICAL 2 TIMES DAILY
Qty: 30 G | Refills: 0 | Status: SHIPPED | OUTPATIENT
Start: 2019-08-13 | End: 2020-05-28

## 2019-08-13 NOTE — PROGRESS NOTES
Assessment/Plan:    He appears to have tinea in the axillary region  I have given him script for Lotrisone cream to apply b i d  For the next week  Following this he should by Lotrimin over-the-counter anti fungal and continue to apply till resolution  There are no diagnoses linked to this encounter  Subjective:      Patient ID: Joann Terry is a 62 y o  male  He developed rash under both arms about 2 weeks ago  Feels it is itchy and irritated and he has been using nystatin powder but has not been helping  He can't keep the armpits dry for the nystatin to be effective  He denies history of rashes in the past   He denies rash anywhere else on the body  The following portions of the patient's history were reviewed and updated as appropriate: allergies, current medications, past family history, past medical history, past social history, past surgical history and problem list     Review of Systems   Constitutional: Negative for activity change, chills and fever  Respiratory: Negative for shortness of breath  Cardiovascular: Negative for chest pain  Skin: Positive for rash  Objective:      /92 (BP Location: Left arm, Patient Position: Sitting, Cuff Size: Standard)   Pulse 68   Temp 98 3 °F (36 8 °C) (Temporal)   Wt 116 kg (255 lb)   SpO2 99%   BMI 37 44 kg/m²          Physical Exam   Constitutional: He appears well-developed and well-nourished  Cardiovascular: Normal rate, regular rhythm and normal heart sounds  Pulmonary/Chest: Effort normal and breath sounds normal    Skin: Skin is warm and dry  Rash noted  No erythema  Erythematous macular eruption both axilla, approx 6 by 10 cm in diameter   Psychiatric: He has a normal mood and affect   His behavior is normal

## 2019-08-13 NOTE — PATIENT INSTRUCTIONS
He appears to have tinea in the axillary region  I have given him script for Lotrisone cream to apply b i d  For the next week  Following this he should by Lotrimin over-the-counter anti fungal and continue to apply till resolution

## 2019-08-26 ENCOUNTER — OFFICE VISIT (OUTPATIENT)
Dept: PAIN MEDICINE | Facility: MEDICAL CENTER | Age: 59
End: 2019-08-26
Payer: COMMERCIAL

## 2019-08-26 VITALS
HEART RATE: 76 BPM | DIASTOLIC BLOOD PRESSURE: 76 MMHG | RESPIRATION RATE: 14 BRPM | WEIGHT: 253 LBS | BODY MASS INDEX: 35.42 KG/M2 | HEIGHT: 71 IN | SYSTOLIC BLOOD PRESSURE: 118 MMHG

## 2019-08-26 DIAGNOSIS — M47.816 LUMBAR SPONDYLOSIS: Primary | ICD-10-CM

## 2019-08-26 DIAGNOSIS — R51.9 NONINTRACTABLE HEADACHE, UNSPECIFIED CHRONICITY PATTERN, UNSPECIFIED HEADACHE TYPE: ICD-10-CM

## 2019-08-26 DIAGNOSIS — M54.50 CHRONIC BILATERAL LOW BACK PAIN WITHOUT SCIATICA: ICD-10-CM

## 2019-08-26 DIAGNOSIS — G89.29 CHRONIC BILATERAL LOW BACK PAIN WITHOUT SCIATICA: ICD-10-CM

## 2019-08-26 PROCEDURE — 99213 OFFICE O/P EST LOW 20 MIN: CPT | Performed by: NURSE PRACTITIONER

## 2019-08-26 RX ORDER — NAPROXEN 500 MG/1
500 TABLET ORAL 2 TIMES DAILY PRN
Qty: 60 TABLET | Refills: 1 | Status: SHIPPED | OUTPATIENT
Start: 2019-08-26 | End: 2020-07-31 | Stop reason: SDUPTHER

## 2019-08-26 NOTE — PROGRESS NOTES
Assessment  1  Lumbar spondylosis    2  Chronic bilateral low back pain without sciatica        Plan  At this time the patient is happy with the results he received from his bilateral lumbar radiofrequency ablations  He does get some pain when he is sleeping  I did suggest the patient try Tylenol arthritis up to 3 times daily or just as needed to relieve some of his pain  He is currently using naproxen without much relief  Patient will follow up on an as-needed basis  [place] Prescription Drug Monitoring Program report was reviewed and was appropriate         My impressions and treatment recommendations were discussed in detail with the patient who verbalized understanding and had no further questions  Discharge instructions were provided  I personally saw and examined the patient and I agree with the above discussed plan of care  No orders of the defined types were placed in this encounter  No orders of the defined types were placed in this encounter  History of Present Illness    Sarah Britt is a 62 y o  male who presents for follow-up regarding his chronic low back pain  Today the patient rates his pain 3/10 he reports that he is doing better  He does get occasional pain that is bothersome in the morning as well as when he is sleeping at night  He describes the pain as throbbing  Patient is status post a right L3-5 radiofrequency ablation on June 21, 2019 and a left L3-5 radiofrequency ablation on July 8, 2019  Both procedures were performed by Dr Varun Mercedes  The patient reports that he did receive 85% relief following these procedures  I have personally reviewed and/or updated the patient's past medical history, past surgical history, family history, social history, current medications, allergies, and vital signs today  Review of Systems   Respiratory: Negative for shortness of breath  Cardiovascular: Negative for chest pain     Gastrointestinal: Negative for constipation, diarrhea, nausea and vomiting  Musculoskeletal: Positive for back pain  Negative for arthralgias, gait problem, joint swelling and myalgias  Skin: Negative for rash  Neurological: Negative for dizziness, seizures and weakness  All other systems reviewed and are negative  Patient Active Problem List   Diagnosis    Hyperlipidemia    Hypertension    Obesity (BMI 30-39  9)    Tinea cruris    GERD (gastroesophageal reflux disease)    Lumbar spondylosis    Chronic bilateral low back pain without sciatica    Prediabetes       Past Medical History:   Diagnosis Date    Dermatitis, contact     last assessed    13    resolved   14     Diverticulitis of colon     last assessed   10/10/12   resolved   14    Hematuria     last assessed  Marcine Grow Marcine Grow 1/20/15  resolved    2/6/15    Hypertension     unspecified type    last assessed  Marcine Grow Marcine Grow 18    Rotator cuff tendinitis     unspecified laterality    last assessed    13   resolved   14      Sebaceous cyst     last assessed   14   resolved    2/6/15    Swelling of wrist joint     right    last assessed    2/6/15    resolved    4/6/15       Past Surgical History:   Procedure Laterality Date    COLONOSCOPY      complete       Family History   Problem Relation Age of Onset    Brain cancer Mother     Colon cancer Mother     Heart attack Father         acute /  at 50     Colon cancer Brother     Hypertension Brother        Social History     Occupational History    Not on file   Tobacco Use    Smoking status: Never Smoker    Smokeless tobacco: Never Used   Substance and Sexual Activity    Alcohol use: Yes     Frequency: Monthly or less     Drinks per session: 3 or 4     Comment: social - rare    Drug use: No    Sexual activity: Not on file       Current Outpatient Medications on File Prior to Visit   Medication Sig    amLODIPine (NORVASC) 10 mg tablet Take 1 tablet (10 mg total) by mouth daily    atorvastatin (LIPITOR) 20 mg tablet Take 1 tablet (20 mg total) by mouth daily    clotrimazole-betamethasone (LOTRISONE) 1-0 05 % cream Apply topically 2 (two) times a day    fluticasone (FLONASE) 50 mcg/act nasal spray 2 sprays into each nostril daily as needed     naproxen (NAPROSYN) 500 mg tablet Take 1 tablet (500 mg total) by mouth 2 (two) times a day as needed for headaches    nystatin (MYCOSTATIN) powder Apply topically 3 (three) times a day QID prn (Patient taking differently: Apply topically QID prn)     No current facility-administered medications on file prior to visit  Allergies   Allergen Reactions    Celecoxib Syncope       Physical Exam    /76   Pulse 76   Resp 14   Ht 5' 11" (1 803 m)   Wt 115 kg (253 lb)   BMI 35 29 kg/m²     Constitutional: normal, well developed, well nourished, alert, in no distress and non-toxic and no overt pain behavior    Eyes: anicteric  HEENT: grossly intact  Neck: supple, symmetric, trachea midline and no masses   Pulmonary:even and unlabored  Cardiovascular:No edema or pitting edema present  Skin:Normal without rashes or lesions and well hydrated  Psychiatric:Mood and affect appropriate  Neurologic:Cranial Nerves II-XII grossly intact  Musculoskeletal:normal    Imaging

## 2019-10-14 ENCOUNTER — CLINICAL SUPPORT (OUTPATIENT)
Dept: FAMILY MEDICINE CLINIC | Facility: CLINIC | Age: 59
End: 2019-10-14
Payer: COMMERCIAL

## 2019-10-14 DIAGNOSIS — Z23 NEED FOR SHINGLES VACCINE: Primary | ICD-10-CM

## 2019-10-14 PROCEDURE — 90471 IMMUNIZATION ADMIN: CPT | Performed by: PHYSICIAN ASSISTANT

## 2019-10-14 PROCEDURE — 90750 HZV VACC RECOMBINANT IM: CPT | Performed by: PHYSICIAN ASSISTANT

## 2019-10-25 DIAGNOSIS — I10 ESSENTIAL HYPERTENSION: ICD-10-CM

## 2019-10-25 RX ORDER — AMLODIPINE BESYLATE 10 MG/1
TABLET ORAL
Qty: 90 TABLET | Refills: 2 | Status: SHIPPED | OUTPATIENT
Start: 2019-10-25 | End: 2020-12-02

## 2019-11-03 NOTE — PROGRESS NOTES
FAMILY PRACTICE OFFICE VISIT  [facility]       NAME: Samuel Ellison  AGE: 62 y o  SEX: male       : 1960        MRN: [id number]    DATE: 2019  TIME: 11:49 AM    Assessment and Plan     Problem List Items Addressed This Visit        Cardiovascular and Mediastinum    Hypertension     Controlled  Continue with amlodipine 10 mg daily  Relevant Orders    CBC and differential    Comprehensive metabolic panel    TSH, 3rd generation with Free T4 reflex       Other    Prediabetes     Reviewed that A1c was consistent with prediabetes still but improved since his last A1c about 6 months ago when it was 6 1%  Try to limit carbs and will continue to monitor  Relevant Orders    CBC and differential    Comprehensive metabolic panel    TSH, 3rd generation with Free T4 reflex      Other Visit Diagnoses     Paresthesia of tongue    -  Primary    Reviewed exam appears normal  Will check MRI and labs given persistent symptoms  Consider Neurology eval as well if persists  Relevant Orders    CBC and differential    Comprehensive metabolic panel    TSH, 3rd generation with Free T4 reflex    MRI brain wo contrast    Dizziness        Balance appears intact on exam  Noralyn Rater was negative  Will check labs and MRI  Consider Neuro if symptoms persist  ER if worsen or develop stroke symptoms  Relevant Orders    CBC and differential    Comprehensive metabolic panel    TSH, 3rd generation with Free T4 reflex    MRI brain wo contrast    Claustrophobia        Given tablet of Valium to take prior to MRI - was able to do MRI in the past with Valium but not without some medication       Relevant Medications    diazepam (VALIUM) 10 mg tablet                  Chief Complaint     Chief Complaint   Patient presents with    Numbness     lip  and tongue    Fatigue     for a month     Dizziness     For a month        History of Present Illness   [place] Chi Duenas is a 62y o -year-old male who presents for tongue numbness  Notes that for the last 2 months he has tingling in the tongue and burning in the lips constantly - he notes that he has been feeling off balance a few times a day and then feeling really tired over the last few weeks - denies stress about anything - denies any triggering event and notes that he has not had this before - thought was allergies at first but not resolving - thinks that it might have started a few weeks after he got the first Shingrix and then thinks it might have intensified the sensation in his mouth        Review of Systems   Review of Systems   Constitutional: Positive for fatigue  Negative for chills and fever  HENT: Positive for sore throat (burning some of the way)  Negative for congestion, ear pain, postnasal drip, rhinorrhea and sinus pressure  Burning in the mouth and throat   Eyes: Negative for visual disturbance  Respiratory: Negative for shortness of breath  Cardiovascular: Negative for chest pain and palpitations  Gastrointestinal: Positive for constipation  Negative for abdominal pain, blood in stool, diarrhea, nausea and vomiting  Genitourinary: Negative for dysuria and frequency  Skin: Negative for rash  Neurological: Positive for light-headedness  Negative for tremors, weakness and headaches  Psychiatric/Behavioral: Negative for dysphoric mood  The patient is not nervous/anxious  Active Problem List     Patient Active Problem List   Diagnosis    Hyperlipidemia    Hypertension    Obesity (BMI 30-39  9)    Tinea cruris    GERD (gastroesophageal reflux disease)    Lumbar spondylosis    Chronic bilateral low back pain without sciatica    Prediabetes         Past Medical History:  Past Medical History:   Diagnosis Date    Dermatitis, contact     last assessed    9/26/13    resolved   12/1/14     Diverticulitis of colon     last assessed   10/10/12   resolved   12/1/14    Hematuria     last assessed  Travis Ferris 1/20/15  resolved    2/6/15    Hypertension     unspecified type    last assessed  Travis Ferris 18    Rotator cuff tendinitis     unspecified laterality    last assessed    13   resolved   14      Sebaceous cyst     last assessed   14   resolved    2/6/15    Swelling of wrist joint     right    last assessed    2/6/15    resolved    4/6/15       Past Surgical History:  Past Surgical History:   Procedure Laterality Date    COLONOSCOPY      complete       Family History:  Family History   Problem Relation Age of Onset    Brain cancer Mother     Colon cancer Mother     Heart attack Father         acute /  at 46     Colon cancer Brother     Hypertension Brother        Social History:  Social History     Socioeconomic History    Marital status: /Civil Union     Spouse name: Not on file    Number of children: Not on file    Years of education: Not on file    Highest education level: Not on file   Occupational History    Not on file   Social Needs    Financial resource strain: Not on file    Food insecurity:     Worry: Not on file     Inability: Not on file    Transportation needs:     Medical: Not on file     Non-medical: Not on file   Tobacco Use    Smoking status: Never Smoker    Smokeless tobacco: Never Used   Substance and Sexual Activity    Alcohol use: Yes     Frequency: Monthly or less     Drinks per session: 3 or 4     Comment: social - rare    Drug use: No    Sexual activity: Not on file   Lifestyle    Physical activity:     Days per week: Not on file     Minutes per session: Not on file    Stress: Not on file   Relationships    Social connections:     Talks on phone: Not on file     Gets together: Not on file     Attends [employer] service: Not on file     Active member of club or organization: Not on file     Attends meetings of clubs or organizations: Not on file     Relationship status: Not on file    Intimate partner violence:     Fear of current or ex partner: Not on file     Emotionally abused: Not on file     Physically abused: Not on file     Forced sexual activity: Not on file   Other Topics Concern    Not on file   Social History Narrative    Not on file       Objective     Vitals:    11/05/19 1112   BP: 138/82   BP Location: Left arm   Patient Position: Sitting   Cuff Size: Large   Pulse: 66   SpO2: 98%   Weight: 114 kg (251 lb 2 oz)   Height: 5' 11" (1 803 m)     Wt Readings from Last 3 Encounters:   11/05/19 114 kg (251 lb 2 oz)   08/26/19 115 kg (253 lb)   08/13/19 116 kg (255 lb)       Physical Exam   Constitutional: He appears well-developed and well-nourished  No distress  Obese   HENT:   Head: Normocephalic  Right Ear: External ear normal    Left Ear: External ear normal    Nose: Nose normal    Mouth/Throat: Oropharynx is clear and moist  No oropharyngeal exudate  Neck: Neck supple  No thyromegaly present  Cardiovascular: Normal rate, regular rhythm, normal heart sounds and intact distal pulses  No murmur heard  No carotid bruits   Pulmonary/Chest: Effort normal and breath sounds normal  He has no wheezes  He has no rales  Musculoskeletal: He exhibits no edema  Lymphadenopathy:     He has no cervical adenopathy  Neurological: He is alert  He has normal strength  No sensory deficit  He displays a negative Romberg sign  Coordination and gait normal    Reflex Scores:       Patellar reflexes are 2+ on the right side and 2+ on the left side  5/5 strength in UE and LE bilaterally   Skin: Skin is warm and dry  Psychiatric: He has a normal mood and affect  Vitals reviewed        Pertinent Laboratory/Diagnostic Studies:  Lab Results   Component Value Date    GLUCOSE 90 10/06/2015    BUN 8 04/25/2019    CREATININE 1 03 04/25/2019    CALCIUM 8 9 04/25/2019     10/06/2015    K 4 1 04/25/2019    CO2 28 04/25/2019     (H) 04/25/2019     Lab Results   Component Value Date    ALT 35 04/25/2019 AST 24 04/25/2019    ALKPHOS 108 04/25/2019    BILITOT 0 39 10/06/2015       Lab Results   Component Value Date    WBC 5 65 04/25/2019    HGB 14 1 04/25/2019    HCT 44 3 04/25/2019    MCV 87 04/25/2019     04/25/2019     Lab Results   Component Value Date    CHOL 209 10/06/2015     Lab Results   Component Value Date    TRIG 107 04/25/2019     Lab Results   Component Value Date    HDL 73 (H) 04/25/2019     Lab Results   Component Value Date    LDLCALC 63 04/25/2019     Lab Results   Component Value Date    HGBA1C 6 1 04/25/2019       Results for orders placed or performed in visit on 04/25/19   Hemoglobin A1C   Result Value Ref Range    Hemoglobin A1C 6 1 4 2 - 6 3 %     mg/dl       Orders Placed This Encounter   Procedures    MRI brain wo contrast    CBC and differential    Comprehensive metabolic panel    TSH, 3rd generation with Free T4 reflex       ALLERGIES:  Allergies   Allergen Reactions    Celecoxib Syncope       Current Medications     Current Outpatient Medications   Medication Sig Dispense Refill    amLODIPine (NORVASC) 10 mg tablet TAKE 1 TABLET DAILY  90 tablet 2    atorvastatin (LIPITOR) 20 mg tablet Take 1 tablet (20 mg total) by mouth daily 90 tablet 3    fluticasone (FLONASE) 50 mcg/act nasal spray 2 sprays into each nostril daily as needed       naproxen (NAPROSYN) 500 mg tablet TAKE 1 TABLET (500 MG TOTAL) BY MOUTH 2 (TWO) TIMES A DAY AS NEEDED FOR HEADACHES 60 tablet 1    nystatin (MYCOSTATIN) powder Apply topically 3 (three) times a day QID prn (Patient taking differently: Apply topically QID prn) 60 g 2    clotrimazole-betamethasone (LOTRISONE) 1-0 05 % cream Apply topically 2 (two) times a day (Patient not taking: Reported on 11/5/2019) 30 g 0    diazepam (VALIUM) 10 mg tablet Take 1 tablet (10 mg total) by mouth once for 1 dose Take 30-60 minutes prior to MRI  1 tablet 0     No current facility-administered medications for this visit            Health Maintenance Health Maintenance   Topic Date Due    PT PLAN OF CARE  04/24/2019    Depression Screening PHQ  04/25/2020    BMI: Followup Plan  04/25/2020    BMI: Adult  08/26/2020    CRC Screening: Colonoscopy  06/17/2022    DTaP,Tdap,and Td Vaccines (2 - Td) 02/06/2025    Pneumococcal Vaccine: 65+ Years (1 of 2 - PCV13) 11/17/2025    Hepatitis C Screening  Completed    INFLUENZA VACCINE  Completed    Pneumococcal Vaccine: Pediatrics (0 to 5 Years) and At-Risk Patients (6 to 59 Years)  Aged Out    HEPATITIS B VACCINES  Aged [employer] History   Administered Date(s) Administered     Influenza (IM) Preservative Free 10/15/2016    Influenza Quadrivalent 3 years and older 10/04/2018    Influenza TIV (IM) 10/27/2006, 11/26/2010, 10/20/2011, 10/10/2012, 10/08/2015, 10/06/2017    Influenza, injectable, quadrivalent, preservative free 0 5 mL 10/03/2019    Tdap 02/06/2015    Zoster Vaccine Recombinant 08/12/2019, 10/14/2019       Rock Esme PA-C  11/5/2019 11:49 AM  [facility]

## 2019-11-05 ENCOUNTER — OFFICE VISIT (OUTPATIENT)
Dept: FAMILY MEDICINE CLINIC | Facility: CLINIC | Age: 59
End: 2019-11-05
Payer: COMMERCIAL

## 2019-11-05 ENCOUNTER — APPOINTMENT (OUTPATIENT)
Dept: LAB | Facility: CLINIC | Age: 59
End: 2019-11-05
Payer: COMMERCIAL

## 2019-11-05 VITALS
SYSTOLIC BLOOD PRESSURE: 138 MMHG | BODY MASS INDEX: 35.16 KG/M2 | DIASTOLIC BLOOD PRESSURE: 82 MMHG | HEIGHT: 71 IN | OXYGEN SATURATION: 98 % | HEART RATE: 66 BPM | WEIGHT: 251.13 LBS

## 2019-11-05 DIAGNOSIS — R42 DIZZINESS: ICD-10-CM

## 2019-11-05 DIAGNOSIS — R44.8 PARESTHESIA OF TONGUE: Primary | ICD-10-CM

## 2019-11-05 DIAGNOSIS — R44.8 PARESTHESIA OF TONGUE: ICD-10-CM

## 2019-11-05 DIAGNOSIS — F40.240 CLAUSTROPHOBIA: ICD-10-CM

## 2019-11-05 DIAGNOSIS — R73.03 PREDIABETES: ICD-10-CM

## 2019-11-05 DIAGNOSIS — I10 ESSENTIAL HYPERTENSION: ICD-10-CM

## 2019-11-05 LAB
ALBUMIN SERPL BCP-MCNC: 3.9 G/DL (ref 3.5–5)
ALP SERPL-CCNC: 93 U/L (ref 46–116)
ALT SERPL W P-5'-P-CCNC: 26 U/L (ref 12–78)
ANION GAP SERPL CALCULATED.3IONS-SCNC: 4 MMOL/L (ref 4–13)
AST SERPL W P-5'-P-CCNC: 19 U/L (ref 5–45)
BASOPHILS # BLD AUTO: 0.03 THOUSANDS/ΜL (ref 0–0.1)
BASOPHILS NFR BLD AUTO: 1 % (ref 0–1)
BILIRUB SERPL-MCNC: 0.53 MG/DL (ref 0.2–1)
BUN SERPL-MCNC: 7 MG/DL (ref 5–25)
CALCIUM SERPL-MCNC: 9 MG/DL (ref 8.3–10.1)
CHLORIDE SERPL-SCNC: 108 MMOL/L (ref 100–108)
CO2 SERPL-SCNC: 28 MMOL/L (ref 21–32)
CREAT SERPL-MCNC: 0.86 MG/DL (ref 0.6–1.3)
EOSINOPHIL # BLD AUTO: 0.03 THOUSAND/ΜL (ref 0–0.61)
EOSINOPHIL NFR BLD AUTO: 1 % (ref 0–6)
ERYTHROCYTE [DISTWIDTH] IN BLOOD BY AUTOMATED COUNT: 15.3 % (ref 11.6–15.1)
EST. AVERAGE GLUCOSE BLD GHB EST-MCNC: 126 MG/DL
GFR SERPL CREATININE-BSD FRML MDRD: 110 ML/MIN/1.73SQ M
GLUCOSE P FAST SERPL-MCNC: 88 MG/DL (ref 65–99)
HBA1C MFR BLD: 6 % (ref 4.2–6.3)
HCT VFR BLD AUTO: 42.9 % (ref 36.5–49.3)
HGB BLD-MCNC: 13.6 G/DL (ref 12–17)
IMM GRANULOCYTES # BLD AUTO: 0.01 THOUSAND/UL (ref 0–0.2)
IMM GRANULOCYTES NFR BLD AUTO: 0 % (ref 0–2)
LYMPHOCYTES # BLD AUTO: 1.97 THOUSANDS/ΜL (ref 0.6–4.47)
LYMPHOCYTES NFR BLD AUTO: 36 % (ref 14–44)
MCH RBC QN AUTO: 27.6 PG (ref 26.8–34.3)
MCHC RBC AUTO-ENTMCNC: 31.7 G/DL (ref 31.4–37.4)
MCV RBC AUTO: 87 FL (ref 82–98)
MONOCYTES # BLD AUTO: 0.47 THOUSAND/ΜL (ref 0.17–1.22)
MONOCYTES NFR BLD AUTO: 9 % (ref 4–12)
NEUTROPHILS # BLD AUTO: 2.93 THOUSANDS/ΜL (ref 1.85–7.62)
NEUTS SEG NFR BLD AUTO: 53 % (ref 43–75)
NRBC BLD AUTO-RTO: 0 /100 WBCS
PLATELET # BLD AUTO: 237 THOUSANDS/UL (ref 149–390)
PMV BLD AUTO: 9.5 FL (ref 8.9–12.7)
POTASSIUM SERPL-SCNC: 4.2 MMOL/L (ref 3.5–5.3)
PROT SERPL-MCNC: 7.4 G/DL (ref 6.4–8.2)
RBC # BLD AUTO: 4.92 MILLION/UL (ref 3.88–5.62)
SODIUM SERPL-SCNC: 140 MMOL/L (ref 136–145)
TSH SERPL DL<=0.05 MIU/L-ACNC: 1.2 UIU/ML (ref 0.36–3.74)
WBC # BLD AUTO: 5.44 THOUSAND/UL (ref 4.31–10.16)

## 2019-11-05 PROCEDURE — 36415 COLL VENOUS BLD VENIPUNCTURE: CPT

## 2019-11-05 PROCEDURE — 99214 OFFICE O/P EST MOD 30 MIN: CPT | Performed by: PHYSICIAN ASSISTANT

## 2019-11-05 PROCEDURE — 3075F SYST BP GE 130 - 139MM HG: CPT | Performed by: PHYSICIAN ASSISTANT

## 2019-11-05 PROCEDURE — 84443 ASSAY THYROID STIM HORMONE: CPT

## 2019-11-05 PROCEDURE — 3008F BODY MASS INDEX DOCD: CPT | Performed by: PHYSICIAN ASSISTANT

## 2019-11-05 PROCEDURE — 80053 COMPREHEN METABOLIC PANEL: CPT

## 2019-11-05 PROCEDURE — 3079F DIAST BP 80-89 MM HG: CPT | Performed by: PHYSICIAN ASSISTANT

## 2019-11-05 PROCEDURE — 83036 HEMOGLOBIN GLYCOSYLATED A1C: CPT

## 2019-11-05 PROCEDURE — 85025 COMPLETE CBC W/AUTO DIFF WBC: CPT

## 2019-11-05 RX ORDER — DIAZEPAM 10 MG/1
10 TABLET ORAL ONCE
Qty: 1 TABLET | Refills: 0 | Status: SHIPPED | OUTPATIENT
Start: 2019-11-05 | End: 2020-05-28

## 2019-11-05 NOTE — ASSESSMENT & PLAN NOTE
Reviewed that A1c was consistent with prediabetes still but improved since his last A1c about 6 months ago when it was 6 1%  Try to limit carbs and will continue to monitor

## 2019-11-13 ENCOUNTER — HOSPITAL ENCOUNTER (OUTPATIENT)
Dept: MRI IMAGING | Facility: HOSPITAL | Age: 59
Discharge: HOME | End: 2019-11-13
Payer: COMMERCIAL

## 2019-11-13 DIAGNOSIS — R44.8 PARESTHESIA OF TONGUE: ICD-10-CM

## 2019-11-13 DIAGNOSIS — R42 DIZZINESS: ICD-10-CM

## 2019-11-13 PROCEDURE — 70551 MRI BRAIN STEM W/O DYE: CPT

## 2019-11-15 DIAGNOSIS — R42 DIZZINESS: ICD-10-CM

## 2019-11-15 DIAGNOSIS — R44.8 PARESTHESIA OF TONGUE: Primary | ICD-10-CM

## 2019-12-31 DIAGNOSIS — E78.5 HYPERLIPIDEMIA, UNSPECIFIED HYPERLIPIDEMIA TYPE: ICD-10-CM

## 2019-12-31 RX ORDER — ATORVASTATIN CALCIUM 20 MG/1
TABLET, FILM COATED ORAL
Qty: 90 TABLET | Refills: 3 | Status: SHIPPED | OUTPATIENT
Start: 2019-12-31 | End: 2021-03-04

## 2020-05-23 ENCOUNTER — NURSE TRIAGE (OUTPATIENT)
Dept: OTHER | Facility: OTHER | Age: 60
End: 2020-05-23

## 2020-05-26 ENCOUNTER — OFFICE VISIT (OUTPATIENT)
Dept: FAMILY MEDICINE CLINIC | Facility: CLINIC | Age: 60
End: 2020-05-26
Payer: COMMERCIAL

## 2020-05-26 ENCOUNTER — APPOINTMENT (OUTPATIENT)
Dept: RADIOLOGY | Facility: MEDICAL CENTER | Age: 60
End: 2020-05-26
Payer: COMMERCIAL

## 2020-05-26 VITALS
HEART RATE: 104 BPM | DIASTOLIC BLOOD PRESSURE: 102 MMHG | HEIGHT: 71 IN | SYSTOLIC BLOOD PRESSURE: 158 MMHG | TEMPERATURE: 97.1 F | BODY MASS INDEX: 34.72 KG/M2 | OXYGEN SATURATION: 98 % | WEIGHT: 248 LBS

## 2020-05-26 DIAGNOSIS — M54.2 NECK PAIN: ICD-10-CM

## 2020-05-26 DIAGNOSIS — R07.89 CHEST WALL PAIN: ICD-10-CM
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
## 2020-05-26 DIAGNOSIS — S93.401A SPRAIN OF RIGHT ANKLE, UNSPECIFIED LIGAMENT, INITIAL ENCOUNTER: ICD-10-CM

## 2020-05-26 PROCEDURE — 3008F BODY MASS INDEX DOCD: CPT | Performed by: PHYSICIAN ASSISTANT

## 2020-05-26 PROCEDURE — 99213 OFFICE O/P EST LOW 20 MIN: CPT | Performed by: PHYSICIAN ASSISTANT

## 2020-05-26 PROCEDURE — 71111 X-RAY EXAM RIBS/CHEST4/> VWS: CPT

## 2020-05-26 PROCEDURE — 1036F TOBACCO NON-USER: CPT | Performed by: PHYSICIAN ASSISTANT

## 2020-05-26 RX ORDER — METHOCARBAMOL 500 MG/1
500 TABLET, FILM COATED ORAL 3 TIMES DAILY PRN
Qty: 30 TABLET | Refills: 0 | Status: SHIPPED | OUTPATIENT
Start: 2020-05-26 | End: 2021-02-05

## 2020-05-26 RX ORDER — PREDNISONE 1 MG/1
TABLET ORAL
Qty: 21 TABLET | Refills: 0 | Status: SHIPPED | OUTPATIENT
Start: 2020-05-26 | End: 2020-07-31
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
## 2020-07-31 ENCOUNTER — APPOINTMENT (OUTPATIENT)
Dept: LAB | Facility: CLINIC | Age: 60
End: 2020-07-31
Payer: COMMERCIAL

## 2020-07-31 ENCOUNTER — APPOINTMENT (OUTPATIENT)
Dept: RADIOLOGY | Facility: MEDICAL CENTER | Age: 60
End: 2020-07-31
Payer: COMMERCIAL

## 2020-07-31 ENCOUNTER — OFFICE VISIT (OUTPATIENT)
Dept: FAMILY MEDICINE CLINIC | Facility: CLINIC | Age: 60
End: 2020-07-31
Payer: COMMERCIAL

## 2020-07-31 VITALS
BODY MASS INDEX: 34.88 KG/M2 | TEMPERATURE: 97.8 F | HEIGHT: 69 IN | WEIGHT: 235.5 LBS | DIASTOLIC BLOOD PRESSURE: 68 MMHG | OXYGEN SATURATION: 98 % | HEART RATE: 72 BPM | SYSTOLIC BLOOD PRESSURE: 116 MMHG

## 2020-07-31 DIAGNOSIS — R73.03 PREDIABETES: ICD-10-CM

## 2020-07-31 DIAGNOSIS — M25.511 RIGHT SHOULDER PAIN, UNSPECIFIED CHRONICITY: ICD-10-CM

## 2020-07-31 DIAGNOSIS — Z12.5 PROSTATE CANCER SCREENING: ICD-10-CM

## 2020-07-31 DIAGNOSIS — Z12.5 SCREENING FOR PROSTATE CANCER: ICD-10-CM

## 2020-07-31 DIAGNOSIS — E66.9 OBESITY (BMI 30-39.9): ICD-10-CM

## 2020-07-31 DIAGNOSIS — E78.5 HYPERLIPIDEMIA, UNSPECIFIED HYPERLIPIDEMIA TYPE: ICD-10-CM

## 2020-07-31 DIAGNOSIS — R51.9 NONINTRACTABLE HEADACHE, UNSPECIFIED CHRONICITY PATTERN, UNSPECIFIED HEADACHE TYPE: ICD-10-CM

## 2020-07-31 DIAGNOSIS — Z00.00 ANNUAL PHYSICAL EXAM: Primary | ICD-10-CM

## 2020-07-31 DIAGNOSIS — I10 ESSENTIAL HYPERTENSION: ICD-10-CM

## 2020-07-31 DIAGNOSIS — Z11.4 SCREENING FOR HIV (HUMAN IMMUNODEFICIENCY VIRUS): ICD-10-CM

## 2020-07-31 PROBLEM — K21.9 GERD (GASTROESOPHAGEAL REFLUX DISEASE): Status: RESOLVED | Noted: 2018-04-12 | Resolved: 2020-07-31

## 2020-07-31 LAB
ALBUMIN SERPL BCP-MCNC: 4.2 G/DL (ref 3.5–5)
ALP SERPL-CCNC: 107 U/L (ref 46–116)
ALT SERPL W P-5'-P-CCNC: 28 U/L (ref 12–78)
ANION GAP SERPL CALCULATED.3IONS-SCNC: 5 MMOL/L (ref 4–13)
AST SERPL W P-5'-P-CCNC: 17 U/L (ref 5–45)
BILIRUB SERPL-MCNC: 0.37 MG/DL (ref 0.2–1)
BUN SERPL-MCNC: 11 MG/DL (ref 5–25)
CALCIUM SERPL-MCNC: 10.1 MG/DL (ref 8.3–10.1)
CHLORIDE SERPL-SCNC: 108 MMOL/L (ref 100–108)
CHOLEST SERPL-MCNC: 169 MG/DL (ref 50–200)
CO2 SERPL-SCNC: 28 MMOL/L (ref 21–32)
CREAT SERPL-MCNC: 1.03 MG/DL (ref 0.6–1.3)
GFR SERPL CREATININE-BSD FRML MDRD: 91 ML/MIN/1.73SQ M
GLUCOSE P FAST SERPL-MCNC: 111 MG/DL (ref 65–99)
HDLC SERPL-MCNC: 80 MG/DL
LDLC SERPL CALC-MCNC: 73 MG/DL (ref 0–100)
POTASSIUM SERPL-SCNC: 3.7 MMOL/L (ref 3.5–5.3)
PROT SERPL-MCNC: 8 G/DL (ref 6.4–8.2)
PSA SERPL-MCNC: 1.8 NG/ML (ref 0–4)
SL AMB POCT HEMOGLOBIN AIC: 5.6 (ref ?–6.5)
SODIUM SERPL-SCNC: 141 MMOL/L (ref 136–145)
TRIGL SERPL-MCNC: 79 MG/DL

## 2020-07-31 PROCEDURE — 3078F DIAST BP <80 MM HG: CPT | Performed by: PHYSICIAN ASSISTANT

## 2020-07-31 PROCEDURE — 83036 HEMOGLOBIN GLYCOSYLATED A1C: CPT | Performed by: PHYSICIAN ASSISTANT

## 2020-07-31 PROCEDURE — 99396 PREV VISIT EST AGE 40-64: CPT | Performed by: PHYSICIAN ASSISTANT

## 2020-07-31 PROCEDURE — 87389 HIV-1 AG W/HIV-1&-2 AB AG IA: CPT

## 2020-07-31 PROCEDURE — 3008F BODY MASS INDEX DOCD: CPT | Performed by: PHYSICIAN ASSISTANT

## 2020-07-31 PROCEDURE — G0103 PSA SCREENING: HCPCS

## 2020-07-31 PROCEDURE — 73030 X-RAY EXAM OF SHOULDER: CPT

## 2020-07-31 PROCEDURE — 80061 LIPID PANEL: CPT

## 2020-07-31 PROCEDURE — 36415 COLL VENOUS BLD VENIPUNCTURE: CPT

## 2020-07-31 PROCEDURE — 3074F SYST BP LT 130 MM HG: CPT | Performed by: PHYSICIAN ASSISTANT

## 2020-07-31 PROCEDURE — 80053 COMPREHEN METABOLIC PANEL: CPT

## 2020-07-31 RX ORDER — NAPROXEN 500 MG/1
500 TABLET ORAL 2 TIMES DAILY PRN
Qty: 60 TABLET | Refills: 1 | Status: SHIPPED | OUTPATIENT
Start: 2020-07-31 | End: 2020-11-18

## 2020-07-31 NOTE — PATIENT INSTRUCTIONS

## 2020-08-01 NOTE — ASSESSMENT & PLAN NOTE
Was well controlled last year  Will continue with atorvastatin 20 mg daily for now  Will recheck with labs as ordered today

## 2020-08-01 NOTE — ASSESSMENT & PLAN NOTE
Patient has lost 13 lb over the last 2 months  He was encouraged to continue with his improved diet  He is encouraged to exercise regularly  Will continue to monitor  BMI Counseling: Body mass index is 34 78 kg/m²  The BMI is above normal  Nutrition recommendations include moderation in carbohydrate intake  Exercise recommendations include exercising 3-5 times per week

## 2020-08-01 NOTE — ASSESSMENT & PLAN NOTE
We reviewed that his A1c improved from 6 0% in November to 5 6% today  He was encouraged to continue with his improved diet  Will continue to monitor  Will plan on rechecking in about 6 months when he returns for his next visit

## 2020-08-02 LAB — HIV 1+2 AB+HIV1 P24 AG SERPL QL IA: NORMAL

## 2020-09-25 ENCOUNTER — IMMUNIZATIONS (OUTPATIENT)
Dept: FAMILY MEDICINE CLINIC | Facility: CLINIC | Age: 60
End: 2020-09-25
Payer: COMMERCIAL

## 2020-09-25 DIAGNOSIS — Z23 FLU VACCINE NEED: Primary | ICD-10-CM

## 2020-09-25 PROCEDURE — 90682 RIV4 VACC RECOMBINANT DNA IM: CPT

## 2020-09-25 PROCEDURE — 90471 IMMUNIZATION ADMIN: CPT

## 2020-11-18 DIAGNOSIS — R51.9 NONINTRACTABLE HEADACHE, UNSPECIFIED CHRONICITY PATTERN, UNSPECIFIED HEADACHE TYPE: ICD-10-CM

## 2020-11-18 RX ORDER — NAPROXEN 500 MG/1
500 TABLET ORAL 2 TIMES DAILY PRN
Qty: 60 TABLET | Refills: 1 | Status: SHIPPED | OUTPATIENT
Start: 2020-11-18

## 2020-12-02 DIAGNOSIS — I10 ESSENTIAL HYPERTENSION: ICD-10-CM

## 2020-12-02 RX ORDER — AMLODIPINE BESYLATE 10 MG/1
TABLET ORAL
Qty: 90 TABLET | Refills: 1 | Status: SHIPPED | OUTPATIENT
Start: 2020-12-02 | End: 2021-05-24

## 2020-12-02 RX ORDER — AMLODIPINE BESYLATE 10 MG/1
10 TABLET ORAL DAILY
Qty: 90 TABLET | Refills: 1 | OUTPATIENT
Start: 2020-12-02

## 2020-12-02 NOTE — TELEPHONE ENCOUNTER
Last office visit: 7/31/2020- Nydia Pierre  Last refilled: 10/25/2019 #90 x 2 refills  Last labs: 7/31/2020  Next office visit: 2/3/21- Nydia Pierre

## 2021-02-05 ENCOUNTER — OFFICE VISIT (OUTPATIENT)
Dept: FAMILY MEDICINE CLINIC | Facility: CLINIC | Age: 61
End: 2021-02-05
Payer: COMMERCIAL

## 2021-02-05 VITALS
SYSTOLIC BLOOD PRESSURE: 118 MMHG | HEIGHT: 69 IN | OXYGEN SATURATION: 99 % | WEIGHT: 231.2 LBS | BODY MASS INDEX: 34.24 KG/M2 | DIASTOLIC BLOOD PRESSURE: 70 MMHG | HEART RATE: 83 BPM | TEMPERATURE: 97.3 F

## 2021-02-05 DIAGNOSIS — R73.03 PREDIABETES: ICD-10-CM

## 2021-02-05 DIAGNOSIS — Z12.5 PROSTATE CANCER SCREENING: ICD-10-CM

## 2021-02-05 DIAGNOSIS — E66.9 OBESITY (BMI 30-39.9): ICD-10-CM

## 2021-02-05 DIAGNOSIS — E78.5 HYPERLIPIDEMIA, UNSPECIFIED HYPERLIPIDEMIA TYPE: ICD-10-CM

## 2021-02-05 DIAGNOSIS — I10 ESSENTIAL HYPERTENSION: Primary | ICD-10-CM

## 2021-02-05 PROCEDURE — 3008F BODY MASS INDEX DOCD: CPT | Performed by: PHYSICIAN ASSISTANT

## 2021-02-05 PROCEDURE — 3725F SCREEN DEPRESSION PERFORMED: CPT | Performed by: PHYSICIAN ASSISTANT

## 2021-02-05 PROCEDURE — 1036F TOBACCO NON-USER: CPT | Performed by: PHYSICIAN ASSISTANT

## 2021-02-05 PROCEDURE — 99214 OFFICE O/P EST MOD 30 MIN: CPT | Performed by: PHYSICIAN ASSISTANT

## 2021-02-05 RX ORDER — SILDENAFIL 50 MG/1
TABLET, FILM COATED ORAL
COMMUNITY
Start: 2020-12-16

## 2021-02-05 NOTE — ASSESSMENT & PLAN NOTE
Improved  He has lost another 4 lb since his last visit about 6 months ago  He will continue with his improved diet and regular exercise  Will continue to monitor  BMI Counseling: Body mass index is 34 14 kg/m²  The BMI is above normal  Nutrition recommendations include decreasing overall calorie intake  Exercise recommendations include moderate aerobic physical activity for 150 minutes/week and exercising 3-5 times per week

## 2021-02-05 NOTE — PROGRESS NOTES
FAMILY PRACTICE OFFICE VISIT  [facility] - [facility]       NAME: Sanjay Lopez  AGE: 61 y o  SEX: male       : 1960        MRN: [id number]    DATE: 2021  TIME: 11:33 AM    Assessment and Plan     Problem List Items Addressed This Visit        Cardiovascular and Mediastinum    Hypertension - Primary       Well controlled  Continue amlodipine 10 mg daily  Will continue to monitor  Relevant Orders    CBC and differential    Comprehensive metabolic panel    Lipid Panel with Direct LDL reflex    TSH, 3rd generation with Free T4 reflex       Other    Hyperlipidemia       Currently on atorvastatin 20 mg daily  Recheck lipid panel prior to next visit  Relevant Orders    CBC and differential    Comprehensive metabolic panel    Lipid Panel with Direct LDL reflex    TSH, 3rd generation with Free T4 reflex    Obesity (BMI 30-39  9)       Improved  He has lost another 4 lb since his last visit about 6 months ago  He will continue with his improved diet and regular exercise  Will continue to monitor  BMI Counseling: Body mass index is 34 14 kg/m²  The BMI is above normal  Nutrition recommendations include decreasing overall calorie intake  Exercise recommendations include moderate aerobic physical activity for 150 minutes/week and exercising 3-5 times per week  Relevant Orders    CBC and differential    Comprehensive metabolic panel    Lipid Panel with Direct LDL reflex    TSH, 3rd generation with Free T4 reflex    Prediabetes     A1c was normal at last visit at 5 6%  He will go for labs prior to his next visit to re-evaluate this  He will continue to follow a diet low in carbohydrates  Relevant Orders    Hemoglobin A1C      Other Visit Diagnoses     Prostate cancer screening        Relevant Orders    PSA, Total Screen          Hypertension    Well controlled  Continue amlodipine 10 mg daily    Will continue to monitor  Hyperlipidemia    Currently on atorvastatin 20 mg daily  Recheck lipid panel prior to next visit  Obesity (BMI 30-39  9)    Improved  He has lost another 4 lb since his last visit about 6 months ago  He will continue with his improved diet and regular exercise  Will continue to monitor  BMI Counseling: Body mass index is 34 14 kg/m²  The BMI is above normal  Nutrition recommendations include decreasing overall calorie intake  Exercise recommendations include moderate aerobic physical activity for 150 minutes/week and exercising 3-5 times per week  Prediabetes  A1c was normal at last visit at 5 6%  He will go for labs prior to his next visit to re-evaluate this  He will continue to follow a diet low in carbohydrates  Chief Complaint     Chief Complaint   Patient presents with    Follow-up     patient would like a annual exam today  History of Present Illness   Madhu Mitchell is a 61y o -year-old male who presents for 6 month follow-up on chronic medical problems  The patient reports that current blood pressure medications include amlodipine 10 mg daily  Blood pressure readings at home are approximately <120/80  The patient denies symptoms of poor control such as chest pain, shortness of breath, leg swelling, vision changes, headaches, or dizziness  The patient reports 1 cup of coffee daily for caffeine intake and limited salt intake  The patient continues with atorvastatin 20 milligrams daily  Last LDL was 73 in 7/2020  The patient denies myalgias  The patient has hx of prediabetes, and the last A1c was 5 6% in 7/2020  Diet is reported to be limited in carbs  Since the patent's last visit, weight has decreased another 4 pounds  (had lost 13 lbs last visit) Diet is reported to be well-balanced diet  Exercise occurs 2-3 times per day for about 20 minutes on the treadmill per session       Patient also noted last visit having continued right shoulder 6/10 aching and throbbing continuously despote prednisone and methocarbamol plus exercises at home  He was sent for XR, which was normal besides mild glenohumeral OA  He uses Advil or naproxen but not helpful  He was referred to PT, which was not pursued  Review of Systems   Review of Systems   Constitutional: Negative for chills and fever  Respiratory: Negative for shortness of breath  Cardiovascular: Negative for chest pain, palpitations and leg swelling  Musculoskeletal: Positive for arthralgias (intermittent right shoulder pain)  Negative for myalgias  Neurological: Negative for dizziness and headaches  Psychiatric/Behavioral: Negative for dysphoric mood and sleep disturbance  Active Problem List     Patient Active Problem List   Diagnosis    Hyperlipidemia    Hypertension    Obesity (BMI 30-39  9)    Tinea cruris    Lumbar spondylosis    Chronic bilateral low back pain without sciatica    Prediabetes         Past Medical History:  Past Medical History:   Diagnosis Date    Dermatitis, contact     last assessed    13    resolved   14     Diverticulitis of colon     last assessed   10/10/12   resolved   14    Hematuria     last assessed  Grace Meredith 1/20/15  resolved    2/6/15    Hypertension     unspecified type    last assessed  Grace Meredith 18    Rotator cuff tendinitis     unspecified laterality    last assessed    13   resolved   14      Sebaceous cyst     last assessed   14   resolved    2/6/15    Swelling of wrist joint     right    last assessed    2/6/15    resolved    4/6/15       Past Surgical History:  Past Surgical History:   Procedure Laterality Date    COLONOSCOPY      complete       Family History:  Family History   Problem Relation Age of Onset    Brain cancer Mother     Colon cancer Mother     Heart attack Father         acute /  at 46     Colon cancer Brother     Hypertension Brother        Social History:  Social History Socioeconomic History    Marital status: /Civil Union     Spouse name: Not on file    Number of children: Not on file    Years of education: Not on file    Highest education level: Not on file   Occupational History    Not on file   Social Needs    Financial resource strain: Not on file    Food insecurity     Worry: Not on file     Inability: Not on file    Transportation needs     Medical: Not on file     Non-medical: Not on file   Tobacco Use    Smoking status: Never Smoker    Smokeless tobacco: Never Used   Substance and Sexual Activity    Alcohol use: Yes     Frequency: Monthly or less     Drinks per session: 3 or 4     Comment: social - rare    Drug use: No    Sexual activity: Not on file   Lifestyle    Physical activity     Days per week: Not on file     Minutes per session: Not on file    Stress: Not on file   Relationships    Social connections     Talks on phone: Not on file     Gets together: Not on file     Attends Restorationist service: Not on file     Active member of club or organization: Not on file     Attends meetings of clubs or organizations: Not on file     Relationship status: Not on file    Intimate partner violence     Fear of current or ex partner: Not on file     Emotionally abused: Not on file     Physically abused: Not on file     Forced sexual activity: Not on file   Other Topics Concern    Not on file   Social History Narrative    Not on file       Objective     Vitals:    02/05/21 1024   BP: 118/70   BP Location: Left arm   Patient Position: Sitting   Cuff Size: Adult   Pulse: 83   Temp: (!) 97 3 °F (36 3 °C)   TempSrc: Temporal   SpO2: 99%   Weight: 105 kg (231 lb 3 2 oz)   Height: 5' 9" (1 753 m)     Wt Readings from Last 3 Encounters:   02/05/21 105 kg (231 lb 3 2 oz)   07/31/20 107 kg (235 lb 8 oz)   05/26/20 112 kg (248 lb)       Physical Exam  Vitals signs reviewed  Constitutional:       General: He is not in acute distress       Appearance: Normal appearance  He is well-developed  He is obese  He is not ill-appearing  HENT:      Head: Normocephalic and atraumatic  Neck:      Musculoskeletal: Neck supple  Thyroid: No thyromegaly  Cardiovascular:      Rate and Rhythm: Normal rate and regular rhythm  Pulses: Normal pulses  Heart sounds: Normal heart sounds  No murmur  Comments: No carotid bruits noted  Pulmonary:      Effort: Pulmonary effort is normal       Breath sounds: Normal breath sounds  No wheezing, rhonchi or rales  Musculoskeletal:      Right lower leg: No edema  Left lower leg: No edema  Lymphadenopathy:      Cervical: No cervical adenopathy  Neurological:      Mental Status: He is alert     Psychiatric:         Mood and Affect: Mood normal          Behavior: Behavior normal          Pertinent Laboratory/Diagnostic Studies:  Lab Results   Component Value Date    GLUCOSE 90 10/06/2015    BUN 11 07/31/2020    CREATININE 1 03 07/31/2020    CALCIUM 10 1 07/31/2020     10/06/2015    K 3 7 07/31/2020    CO2 28 07/31/2020     07/31/2020     Lab Results   Component Value Date    ALT 28 07/31/2020    AST 17 07/31/2020    ALKPHOS 107 07/31/2020    BILITOT 0 39 10/06/2015       Lab Results   Component Value Date    WBC 5 44 11/05/2019    HGB 13 6 11/05/2019    HCT 42 9 11/05/2019    MCV 87 11/05/2019     11/05/2019     Lab Results   Component Value Date    CHOL 209 10/06/2015     Lab Results   Component Value Date    TRIG 79 07/31/2020     Lab Results   Component Value Date    HDL 80 07/31/2020     Lab Results   Component Value Date    LDLCALC 73 07/31/2020     Lab Results   Component Value Date    HGBA1C 5 6 07/31/2020       Results for orders placed or performed in visit on 07/31/20   Comprehensive metabolic panel   Result Value Ref Range    Sodium 141 136 - 145 mmol/L    Potassium 3 7 3 5 - 5 3 mmol/L    Chloride 108 100 - 108 mmol/L    CO2 28 21 - 32 mmol/L    ANION GAP 5 4 - 13 mmol/L    BUN 11 5 - 25 mg/dL    Creatinine 1 03 0 60 - 1 30 mg/dL    Glucose, Fasting 111 (H) 65 - 99 mg/dL    Calcium 10 1 8 3 - 10 1 mg/dL    AST 17 5 - 45 U/L    ALT 28 12 - 78 U/L    Alkaline Phosphatase 107 46 - 116 U/L    Total Protein 8 0 6 4 - 8 2 g/dL    Albumin 4 2 3 5 - 5 0 g/dL    Total Bilirubin 0 37 0 20 - 1 00 mg/dL    eGFR 91 ml/min/1 73sq m   Lipid Panel with Direct LDL reflex   Result Value Ref Range    Cholesterol 169 50 - 200 mg/dL    Triglycerides 79 <=150 mg/dL    HDL, Direct 80 >=40 mg/dL    LDL Calculated 73 0 - 100 mg/dL   PSA, Total Screen   Result Value Ref Range    PSA 1 8 0 0 - 4 0 ng/mL   HIV 1/2 ANTIGEN/ANTIBODY (4TH GENERATION) W REFLEX [facility]   Result Value Ref Range    HIV-1/HIV-2 Ab Non-Reactive Non-Reactive         ALLERGIES:  Allergies   Allergen Reactions    Celecoxib Syncope       Current Medications     Current Outpatient Medications   Medication Sig Dispense Refill    amLODIPine (NORVASC) 10 mg tablet TAKE 1 TABLET DAILY  90 tablet 1    atorvastatin (LIPITOR) 20 mg tablet TAKE 1 TABLET BY MOUTH EVERY DAY 90 tablet 3    fluticasone (FLONASE) 50 mcg/act nasal spray 2 sprays into each nostril daily as needed       naproxen (NAPROSYN) 500 mg tablet TAKE 1 TABLET (500 MG TOTAL) BY MOUTH 2 (TWO) TIMES A DAY AS NEEDED FOR HEADACHES 60 tablet 1    sildenafil (VIAGRA) 50 MG tablet TAKE 1 TAB BY MOUTH AS NEEDED 1 HOUR BEFORE SEX  MAX 1/48 HOURS  THIS IS NOT A DAILY MEDICINE  No current facility-administered medications for this visit            Health Maintenance     Health Maintenance   Topic Date Due    PT PLAN OF CARE  04/24/2019    BMI: Followup Plan  08/01/2021    Annual Physical  07/31/2021    Depression Screening PHQ  02/05/2022    BMI: Adult  02/05/2022    Colonoscopy Surveillance  06/17/2022    DTaP,Tdap,and Td Vaccines (2 - Td) 02/06/2025    Colorectal Cancer Screening  06/17/2029    HIV Screening  Completed    Hepatitis C Screening  Completed    Influenza Vaccine  Completed  Pneumococcal Vaccine: Pediatrics (0 to 5 Years) and At-Risk Patients (6 to 59 Years)  Aged Out    HIB Vaccine  Aged Out    Hepatitis B Vaccine  Aged Out    IPV Vaccine  Aged Out    Hepatitis A Vaccine  Aged Out    Meningococcal ACWY Vaccine  Aged Out    HPV Vaccine  Aged [employer] History   Administered Date(s) Administered    Influenza Quadrivalent 3 years and older 10/04/2018    Influenza, injectable, quadrivalent, preservative free 0 5 mL 10/03/2019    Influenza, recombinant, quadrivalent,injectable, preservative free 09/25/2020    Influenza, seasonal, injectable 10/27/2006, 11/26/2010, 10/20/2011, 10/10/2012, 10/08/2015, 10/06/2017    Influenza, seasonal, injectable, preservative free 10/15/2016    Tdap 02/06/2015    Zoster Vaccine Recombinant 08/12/2019, 10/14/2019       Juanito Fuchs PA-C  2/5/2021 11:33 AM  [facility]

## 2021-03-03 DIAGNOSIS — E78.5 HYPERLIPIDEMIA, UNSPECIFIED HYPERLIPIDEMIA TYPE: ICD-10-CM

## 2021-03-04 RX ORDER — ATORVASTATIN CALCIUM 20 MG/1
TABLET, FILM COATED ORAL
Qty: 90 TABLET | Refills: 3 | Status: SHIPPED | OUTPATIENT
Start: 2021-03-04 | End: 2022-03-02

## 2021-03-10 DIAGNOSIS — Z23 ENCOUNTER FOR IMMUNIZATION: ICD-10-CM

## 2021-03-18 ENCOUNTER — IMMUNIZATIONS (OUTPATIENT)
Dept: FAMILY MEDICINE CLINIC | Facility: HOSPITAL | Age: 61
End: 2021-03-18

## 2021-03-18 DIAGNOSIS — Z23 ENCOUNTER FOR IMMUNIZATION: Primary | ICD-10-CM

## 2021-03-18 PROCEDURE — 0001A SARS-COV-2 / COVID-19 MRNA VACCINE (PFIZER-BIONTECH) 30 MCG: CPT

## 2021-03-18 PROCEDURE — 91300 SARS-COV-2 / COVID-19 MRNA VACCINE (PFIZER-BIONTECH) 30 MCG: CPT

## 2021-04-09 ENCOUNTER — IMMUNIZATIONS (OUTPATIENT)
Dept: FAMILY MEDICINE CLINIC | Facility: HOSPITAL | Age: 61
End: 2021-04-09

## 2021-04-09 DIAGNOSIS — Z23 ENCOUNTER FOR IMMUNIZATION: Primary | ICD-10-CM

## 2021-04-09 PROCEDURE — 91300 SARS-COV-2 / COVID-19 MRNA VACCINE (PFIZER-BIONTECH) 30 MCG: CPT

## 2021-04-09 PROCEDURE — 0002A SARS-COV-2 / COVID-19 MRNA VACCINE (PFIZER-BIONTECH) 30 MCG: CPT

## 2021-05-21 DIAGNOSIS — I10 ESSENTIAL HYPERTENSION: ICD-10-CM

## 2021-05-24 RX ORDER — AMLODIPINE BESYLATE 10 MG/1
TABLET ORAL
Qty: 90 TABLET | Refills: 1 | Status: SHIPPED | OUTPATIENT
Start: 2021-05-24 | End: 2021-11-30

## 2021-06-07 ENCOUNTER — TELEPHONE (OUTPATIENT)
Dept: FAMILY MEDICINE CLINIC | Facility: CLINIC | Age: 61
End: 2021-06-07

## 2021-06-07 NOTE — TELEPHONE ENCOUNTER
Patient called to schedule an appointment  Offered to schedule with another physician in office, as Ana Davies is booked, and patient declined  Patient requesting a script for XR of R shoulder d/t pain  Please advise

## 2021-06-08 ENCOUNTER — OFFICE VISIT (OUTPATIENT)
Dept: FAMILY MEDICINE CLINIC | Facility: CLINIC | Age: 61
End: 2021-06-08
Payer: COMMERCIAL

## 2021-06-08 VITALS
OXYGEN SATURATION: 97 % | DIASTOLIC BLOOD PRESSURE: 72 MMHG | HEART RATE: 91 BPM | WEIGHT: 229 LBS | SYSTOLIC BLOOD PRESSURE: 120 MMHG | HEIGHT: 69 IN | BODY MASS INDEX: 33.92 KG/M2

## 2021-06-08 DIAGNOSIS — G89.29 CHRONIC RIGHT SHOULDER PAIN: Primary | ICD-10-CM

## 2021-06-08 DIAGNOSIS — F41.9 ANXIETY: ICD-10-CM

## 2021-06-08 DIAGNOSIS — M25.511 CHRONIC RIGHT SHOULDER PAIN: Primary | ICD-10-CM

## 2021-06-08 PROCEDURE — 3008F BODY MASS INDEX DOCD: CPT | Performed by: INTERNAL MEDICINE

## 2021-06-08 PROCEDURE — 1036F TOBACCO NON-USER: CPT | Performed by: INTERNAL MEDICINE

## 2021-06-08 PROCEDURE — 99213 OFFICE O/P EST LOW 20 MIN: CPT | Performed by: INTERNAL MEDICINE

## 2021-06-08 RX ORDER — PREDNISONE 10 MG/1
TABLET ORAL
Qty: 33 TABLET | Refills: 0 | Status: SHIPPED | OUTPATIENT
Start: 2021-06-08 | End: 2021-08-25 | Stop reason: ALTCHOICE

## 2021-06-08 RX ORDER — ALPRAZOLAM 0.5 MG/1
0.5 TABLET ORAL
Qty: 3 TABLET | Refills: 0 | Status: SHIPPED | OUTPATIENT
Start: 2021-06-08 | End: 2021-08-25 | Stop reason: ALTCHOICE
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
## 2021-06-19 ENCOUNTER — HOSPITAL ENCOUNTER (OUTPATIENT)
Dept: MRI IMAGING | Facility: HOSPITAL | Age: 61
Discharge: HOME | End: 2021-06-19
Attending: INTERNAL MEDICINE
Payer: COMMERCIAL

## 2021-06-19 DIAGNOSIS — G89.29 CHRONIC RIGHT SHOULDER PAIN: ICD-10-CM

## 2021-06-19 DIAGNOSIS — M25.511 CHRONIC RIGHT SHOULDER PAIN: ICD-10-CM

## 2021-06-19 PROCEDURE — G1004 CDSM NDSC: HCPCS

## 2021-06-19 PROCEDURE — 73221 MRI JOINT UPR EXTREM W/O DYE: CPT

## 2021-06-25 ENCOUNTER — OFFICE VISIT (OUTPATIENT)
Dept: FAMILY MEDICINE CLINIC | Facility: CLINIC | Age: 61
End: 2021-06-25
Payer: COMMERCIAL

## 2021-06-25 VITALS
OXYGEN SATURATION: 99 % | HEIGHT: 69 IN | BODY MASS INDEX: 33.68 KG/M2 | RESPIRATION RATE: 12 BRPM | HEART RATE: 74 BPM | WEIGHT: 227.4 LBS | SYSTOLIC BLOOD PRESSURE: 140 MMHG | DIASTOLIC BLOOD PRESSURE: 90 MMHG

## 2021-06-25 DIAGNOSIS — M25.511 CHRONIC RIGHT SHOULDER PAIN: Primary | ICD-10-CM

## 2021-06-25 DIAGNOSIS — G89.29 CHRONIC RIGHT SHOULDER PAIN: Primary | ICD-10-CM

## 2021-06-25 PROCEDURE — 20605 DRAIN/INJ JOINT/BURSA W/O US: CPT | Performed by: INTERNAL MEDICINE

## 2021-06-25 PROCEDURE — 1036F TOBACCO NON-USER: CPT | Performed by: INTERNAL MEDICINE

## 2021-06-25 PROCEDURE — 3008F BODY MASS INDEX DOCD: CPT | Performed by: INTERNAL MEDICINE

## 2021-06-25 PROCEDURE — 99213 OFFICE O/P EST LOW 20 MIN: CPT | Performed by: INTERNAL MEDICINE

## 2021-06-25 NOTE — PROGRESS NOTES
Assessment/Plan:    No problem-specific Assessment & Plan notes found for this encounter  Diagnoses and all orders for this visit:    Chronic right shoulder pain    Other orders  -     Medium joint arthrocentesis      Medium joint arthrocentesis  Universal Protocol:  Consent: Verbal consent obtained  Consent given by: patient  Timeout called at: 6/25/2021 8:57 AM   Patient understanding: patient states understanding of the procedure being performed  Patient identity confirmed: verbally with patient    Supporting Documentation  Indications: pain   Procedure Details  Location: shoulder -   Needle size: 20 G  Ultrasound guidance: no  Approach: posterior      60 mg of methylprednisolone and 3 cc of 1% lidocaine injected into right subacromial space, sterile technique was used, patient tolerated procedure well  Subjective:      Patient ID: Chip Ramachandran is a 61 y o  male  Patient came today with complaints of the right shoulder chronic pain, on MRI he was diagnosed with partial rotator cuff tear  He reports that pain is constant and worse at night  He cannot sleep because of that  We tried NSAIDs and prednisone without any relief  The following portions of the patient's history were reviewed and updated as appropriate: allergies, current medications, past family history, past medical history, past social history, past surgical history, and problem list     Review of Systems   Constitutional: Negative for chills and fever  Musculoskeletal: Positive for arthralgias  Negative for joint swelling, neck pain and neck stiffness  Neurological: Negative for weakness and numbness  Psychiatric/Behavioral: Negative for confusion           Objective:      /90 (BP Location: Left arm, Patient Position: Sitting, Cuff Size: Standard)   Pulse 74   Resp 12   Ht 5' 9" (1 753 m)   Wt 103 kg (227 lb 6 4 oz)   SpO2 99%   BMI 33 58 kg/m²          Physical Exam  Constitutional:       Appearance: He is not ill-appearing  HENT:      Head: Normocephalic and atraumatic  Nose: No congestion or rhinorrhea  Eyes:      Pupils: Pupils are equal, round, and reactive to light  Cardiovascular:      Rate and Rhythm: Normal rate  Pulses: Normal pulses  Pulmonary:      Effort: Pulmonary effort is normal    Abdominal:      General: There is no distension  Palpations: There is no mass  Tenderness: There is no abdominal tenderness  There is no rebound  Hernia: No hernia is present  Musculoskeletal:         General: Tenderness present  No swelling or deformity  Cervical back: No rigidity  No muscular tenderness  Skin:     Findings: No lesion  Neurological:      Mental Status: He is alert and oriented to person, place, and time  Sensory: No sensory deficit  Motor: No weakness  Psychiatric:         Mood and Affect: Mood normal          Behavior: Behavior normal                  Current Outpatient Medications:     ALPRAZolam (XANAX) 0 5 mg tablet, Take 1 tablet (0 5 mg total) by mouth daily at bedtime as needed for anxiety for up to 3 days Take 1 pill 30 minutes before your MRI,  Do not take more than 1 pill a day, Disp: 3 tablet, Rfl: 0    amLODIPine (NORVASC) 10 mg tablet, TAKE 1 TABLET DAILY  , Disp: 90 tablet, Rfl: 1    atorvastatin (LIPITOR) 20 mg tablet, TAKE 1 TABLET BY MOUTH EVERY DAY, Disp: 90 tablet, Rfl: 3    fluticasone (FLONASE) 50 mcg/act nasal spray, 2 sprays into each nostril daily as needed , Disp: , Rfl:     naproxen (NAPROSYN) 500 mg tablet, TAKE 1 TABLET (500 MG TOTAL) BY MOUTH 2 (TWO) TIMES A DAY AS NEEDED FOR HEADACHES, Disp: 60 tablet, Rfl: 1    predniSONE 10 mg tablet, Please take 6 pills on day 1 and 2, 5 pills on day 3 and 4, 4 pills on day 5 and 6, 3 pills on day 7 then stop, Disp: 33 tablet, Rfl: 0    sildenafil (VIAGRA) 50 MG tablet, TAKE 1 TAB BY MOUTH AS NEEDED 1 HOUR BEFORE SEX  MAX 1/48 HOURS   THIS IS NOT A DAILY MEDICINE , Disp: , Rfl:

## 2021-07-29 ENCOUNTER — RA CDI HCC (OUTPATIENT)
Dept: OTHER | Facility: HOSPITAL | Age: 61
End: 2021-07-29

## 2021-07-29 NOTE — PROGRESS NOTES
[facility]  [facility]          Chart reviewed, no opportunity found: CHART REVIEWED, NO OPPORTUNITY FOUND                     Patients insurance company:  [facility] (Medicare Advantage and Commercial)

## 2021-08-25 ENCOUNTER — OFFICE VISIT (OUTPATIENT)
Dept: FAMILY MEDICINE CLINIC | Facility: CLINIC | Age: 61
End: 2021-08-25
Payer: COMMERCIAL

## 2021-08-25 VITALS
BODY MASS INDEX: 33.86 KG/M2 | OXYGEN SATURATION: 98 % | HEIGHT: 69 IN | DIASTOLIC BLOOD PRESSURE: 80 MMHG | RESPIRATION RATE: 12 BRPM | HEART RATE: 85 BPM | SYSTOLIC BLOOD PRESSURE: 118 MMHG | WEIGHT: 228.6 LBS

## 2021-08-25 DIAGNOSIS — Z00.00 ANNUAL PHYSICAL EXAM: Primary | ICD-10-CM

## 2021-08-25 DIAGNOSIS — Z12.5 PROSTATE CANCER SCREENING: ICD-10-CM

## 2021-08-25 DIAGNOSIS — E66.9 OBESITY (BMI 30-39.9): ICD-10-CM

## 2021-08-25 DIAGNOSIS — E55.9 VITAMIN D DEFICIENCY: ICD-10-CM

## 2021-08-25 DIAGNOSIS — I10 ESSENTIAL HYPERTENSION: ICD-10-CM

## 2021-08-25 PROCEDURE — 3008F BODY MASS INDEX DOCD: CPT | Performed by: INTERNAL MEDICINE

## 2021-08-25 PROCEDURE — 99396 PREV VISIT EST AGE 40-64: CPT | Performed by: INTERNAL MEDICINE

## 2021-08-25 PROCEDURE — 1036F TOBACCO NON-USER: CPT | Performed by: INTERNAL MEDICINE

## 2021-08-25 PROCEDURE — 3079F DIAST BP 80-89 MM HG: CPT | Performed by: INTERNAL MEDICINE

## 2021-08-25 PROCEDURE — 3074F SYST BP LT 130 MM HG: CPT | Performed by: INTERNAL MEDICINE

## 2021-08-25 RX ORDER — GABAPENTIN 300 MG/1
300 CAPSULE ORAL
COMMUNITY
Start: 2021-08-19 | End: 2022-08-19

## 2021-08-25 RX ORDER — DIAZEPAM 5 MG/1
TABLET ORAL
COMMUNITY
Start: 2021-08-19 | End: 2021-11-18 | Stop reason: ALTCHOICE

## 2021-08-25 RX ORDER — MELOXICAM 7.5 MG/1
TABLET ORAL
COMMUNITY
Start: 2021-08-19 | End: 2021-11-18 | Stop reason: ALTCHOICE

## 2021-08-25 NOTE — PROGRESS NOTES
Assessment/Plan:    No problem-specific Assessment & Plan notes found for this encounter  Diagnoses and all orders for this visit:    Annual physical exam    Essential hypertension  -     Comprehensive metabolic panel; Future  -     CBC and differential; Future  -     Lipid panel; Future  -     UA (URINE) with reflex to Scope    Vitamin D deficiency  -     Vitamin D 25 hydroxy; Future    Obesity (BMI 30-39 9)  -     TSH, 3rd generation with Free T4 reflex; Future  -     Vitamin D 25 hydroxy; Future  -     HEMOGLOBIN A1C W/ EAG ESTIMATION; Future    Prostate cancer screening  -     PSA, Total Screen; Future    Other orders  -     meloxicam (MOBIC) 7 5 mg tablet; Take 1-2 tablets by mouth as needed daily as needed for moderate to severe pain  -     gabapentin (NEURONTIN) 300 mg capsule; Take 300 mg by mouth  -     diazepam (VALIUM) 5 mg tablet; Take 1-2 tablets by mouth about 60 minutes prior to the MRI study          Subjective:      Patient ID: Caterina Patel is a 61 y o  male  Patient came today for annual checkup  He does not report any active complaints except of chronic right shoulder pain, he follows up with orthopedics for that  He does not eyes any exertional symptoms, he is trying to stay active as possible  He eats healthy  He does not smoke tobacco   He is up-to-date on his vaccinations  He follows up with Gastroenterology regularly  He is pursuing screening for the prostate cancer with PSA  The following portions of the patient's history were reviewed and updated as appropriate: allergies, current medications, past family history, past medical history, past social history, past surgical history, and problem list     Review of Systems   Constitutional: Negative for chills, fatigue and fever  Respiratory: Negative for cough, chest tightness and shortness of breath  Cardiovascular: Negative for chest pain, palpitations and leg swelling     Gastrointestinal: Negative for abdominal distention, abdominal pain, blood in stool, constipation, diarrhea and nausea  Genitourinary: Negative for difficulty urinating and dysuria  Musculoskeletal: Negative for arthralgias, back pain, gait problem, joint swelling, myalgias and neck pain  Skin: Negative for rash  Neurological: Negative for dizziness, weakness, numbness and headaches  Psychiatric/Behavioral: Negative for agitation  Objective:      /80 (BP Location: Left arm, Patient Position: Sitting, Cuff Size: Standard)   Pulse 85   Resp 12   Ht 5' 9" (1 753 m)   Wt 104 kg (228 lb 9 6 oz)   SpO2 98%   BMI 33 76 kg/m²          Physical Exam  Constitutional:       Appearance: He is not ill-appearing  HENT:      Head: Normocephalic and atraumatic  Nose: No congestion or rhinorrhea  Eyes:      Pupils: Pupils are equal, round, and reactive to light  Cardiovascular:      Rate and Rhythm: Normal rate and regular rhythm  Pulses: Normal pulses  Heart sounds: Normal heart sounds  No murmur heard  No friction rub  No gallop  Pulmonary:      Effort: Pulmonary effort is normal  No respiratory distress  Breath sounds: Normal breath sounds  No wheezing or rales  Chest:      Chest wall: No tenderness  Abdominal:      General: Bowel sounds are normal  There is no distension  Palpations: Abdomen is soft  There is no mass  Tenderness: There is no abdominal tenderness  There is no rebound  Hernia: No hernia is present  Musculoskeletal:         General: No swelling, tenderness or deformity  Cervical back: No rigidity  No muscular tenderness  Skin:     Coloration: Skin is not pale  Findings: No erythema, lesion or rash  Neurological:      Mental Status: He is alert and oriented to person, place, and time  Sensory: No sensory deficit  Motor: No weakness     Psychiatric:         Mood and Affect: Mood normal          Behavior: Behavior normal                Current Outpatient Medications:     amLODIPine (NORVASC) 10 mg tablet, TAKE 1 TABLET DAILY  , Disp: 90 tablet, Rfl: 1    atorvastatin (LIPITOR) 20 mg tablet, TAKE 1 TABLET BY MOUTH EVERY DAY, Disp: 90 tablet, Rfl: 3    diazepam (VALIUM) 5 mg tablet, Take 1-2 tablets by mouth about 60 minutes prior to the MRI study, Disp: , Rfl:     fluticasone (FLONASE) 50 mcg/act nasal spray, 2 sprays into each nostril daily as needed , Disp: , Rfl:     gabapentin (NEURONTIN) 300 mg capsule, Take 300 mg by mouth, Disp: , Rfl:     meloxicam (MOBIC) 7 5 mg tablet, Take 1-2 tablets by mouth as needed daily as needed for moderate to severe pain, Disp: , Rfl:     naproxen (NAPROSYN) 500 mg tablet, TAKE 1 TABLET (500 MG TOTAL) BY MOUTH 2 (TWO) TIMES A DAY AS NEEDED FOR HEADACHES, Disp: 60 tablet, Rfl: 1    sildenafil (VIAGRA) 50 MG tablet, TAKE 1 TAB BY MOUTH AS NEEDED 1 HOUR BEFORE SEX  MAX 1/48 HOURS   THIS IS NOT A DAILY MEDICINE , Disp: , Rfl:

## 2021-09-29 ENCOUNTER — APPOINTMENT (OUTPATIENT)
Dept: LAB | Facility: CLINIC | Age: 61
End: 2021-09-29
Payer: COMMERCIAL

## 2021-09-29 DIAGNOSIS — I10 ESSENTIAL HYPERTENSION: ICD-10-CM

## 2021-09-29 DIAGNOSIS — R73.03 PREDIABETES: ICD-10-CM

## 2021-09-29 DIAGNOSIS — Z12.5 PROSTATE CANCER SCREENING: ICD-10-CM

## 2021-09-29 DIAGNOSIS — E78.5 HYPERLIPIDEMIA, UNSPECIFIED HYPERLIPIDEMIA TYPE: ICD-10-CM

## 2021-09-29 DIAGNOSIS — E66.9 OBESITY (BMI 30-39.9): ICD-10-CM

## 2021-09-29 DIAGNOSIS — E55.9 VITAMIN D DEFICIENCY: ICD-10-CM

## 2021-09-29 LAB
25(OH)D3 SERPL-MCNC: 9.2 NG/ML (ref 30–100)
ALBUMIN SERPL BCP-MCNC: 3.7 G/DL (ref 3.5–5)
ALP SERPL-CCNC: 94 U/L (ref 46–116)
ALT SERPL W P-5'-P-CCNC: 30 U/L (ref 12–78)
ANION GAP SERPL CALCULATED.3IONS-SCNC: 3 MMOL/L (ref 4–13)
AST SERPL W P-5'-P-CCNC: 22 U/L (ref 5–45)
BASOPHILS # BLD AUTO: 0.02 THOUSANDS/ΜL (ref 0–0.1)
BASOPHILS NFR BLD AUTO: 0 % (ref 0–1)
BILIRUB SERPL-MCNC: 0.51 MG/DL (ref 0.2–1)
BILIRUB UR QL STRIP: NEGATIVE
BUN SERPL-MCNC: 16 MG/DL (ref 5–25)
CALCIUM SERPL-MCNC: 9.5 MG/DL (ref 8.3–10.1)
CHLORIDE SERPL-SCNC: 110 MMOL/L (ref 100–108)
CHOLEST SERPL-MCNC: 156 MG/DL (ref 50–200)
CLARITY UR: CLEAR
CO2 SERPL-SCNC: 29 MMOL/L (ref 21–32)
COLOR UR: YELLOW
CREAT SERPL-MCNC: 0.92 MG/DL (ref 0.6–1.3)
EOSINOPHIL # BLD AUTO: 0 THOUSAND/ΜL (ref 0–0.61)
EOSINOPHIL NFR BLD AUTO: 0 % (ref 0–6)
ERYTHROCYTE [DISTWIDTH] IN BLOOD BY AUTOMATED COUNT: 15.7 % (ref 11.6–15.1)
EST. AVERAGE GLUCOSE BLD GHB EST-MCNC: 111 MG/DL
GFR SERPL CREATININE-BSD FRML MDRD: 104 ML/MIN/1.73SQ M
GLUCOSE P FAST SERPL-MCNC: 87 MG/DL (ref 65–99)
GLUCOSE UR STRIP-MCNC: NEGATIVE MG/DL
HBA1C MFR BLD: 5.5 %
HCT VFR BLD AUTO: 43.3 % (ref 36.5–49.3)
HDLC SERPL-MCNC: 85 MG/DL
HGB BLD-MCNC: 13.7 G/DL (ref 12–17)
HGB UR QL STRIP.AUTO: NEGATIVE
IMM GRANULOCYTES # BLD AUTO: 0.04 THOUSAND/UL (ref 0–0.2)
IMM GRANULOCYTES NFR BLD AUTO: 0 % (ref 0–2)
KETONES UR STRIP-MCNC: NEGATIVE MG/DL
LDLC SERPL CALC-MCNC: 64 MG/DL (ref 0–100)
LEUKOCYTE ESTERASE UR QL STRIP: NEGATIVE
LYMPHOCYTES # BLD AUTO: 2.19 THOUSANDS/ΜL (ref 0.6–4.47)
LYMPHOCYTES NFR BLD AUTO: 19 % (ref 14–44)
MCH RBC QN AUTO: 28.2 PG (ref 26.8–34.3)
MCHC RBC AUTO-ENTMCNC: 31.6 G/DL (ref 31.4–37.4)
MCV RBC AUTO: 89 FL (ref 82–98)
MONOCYTES # BLD AUTO: 0.63 THOUSAND/ΜL (ref 0.17–1.22)
MONOCYTES NFR BLD AUTO: 6 % (ref 4–12)
NEUTROPHILS # BLD AUTO: 8.57 THOUSANDS/ΜL (ref 1.85–7.62)
NEUTS SEG NFR BLD AUTO: 75 % (ref 43–75)
NITRITE UR QL STRIP: NEGATIVE
NRBC BLD AUTO-RTO: 0 /100 WBCS
PH UR STRIP.AUTO: 6 [PH]
PLATELET # BLD AUTO: 232 THOUSANDS/UL (ref 149–390)
PMV BLD AUTO: 10.2 FL (ref 8.9–12.7)
POTASSIUM SERPL-SCNC: 4 MMOL/L (ref 3.5–5.3)
PROT SERPL-MCNC: 7.3 G/DL (ref 6.4–8.2)
PROT UR STRIP-MCNC: NEGATIVE MG/DL
PSA SERPL-MCNC: 2 NG/ML (ref 0–4)
RBC # BLD AUTO: 4.86 MILLION/UL (ref 3.88–5.62)
SODIUM SERPL-SCNC: 142 MMOL/L (ref 136–145)
SP GR UR STRIP.AUTO: 1.02 (ref 1–1.03)
TRIGL SERPL-MCNC: 33 MG/DL
TSH SERPL DL<=0.05 MIU/L-ACNC: 1.17 UIU/ML (ref 0.36–3.74)
UROBILINOGEN UR QL STRIP.AUTO: 0.2 E.U./DL
WBC # BLD AUTO: 11.45 THOUSAND/UL (ref 4.31–10.16)

## 2021-09-29 PROCEDURE — 80053 COMPREHEN METABOLIC PANEL: CPT

## 2021-09-29 PROCEDURE — 80061 LIPID PANEL: CPT

## 2021-09-29 PROCEDURE — 81003 URINALYSIS AUTO W/O SCOPE: CPT | Performed by: INTERNAL MEDICINE

## 2021-09-29 PROCEDURE — 84443 ASSAY THYROID STIM HORMONE: CPT

## 2021-09-29 PROCEDURE — 83036 HEMOGLOBIN GLYCOSYLATED A1C: CPT

## 2021-09-29 PROCEDURE — 85025 COMPLETE CBC W/AUTO DIFF WBC: CPT

## 2021-09-29 PROCEDURE — 82306 VITAMIN D 25 HYDROXY: CPT

## 2021-09-29 PROCEDURE — G0103 PSA SCREENING: HCPCS

## 2021-09-29 PROCEDURE — 36415 COLL VENOUS BLD VENIPUNCTURE: CPT

## 2021-10-29 ENCOUNTER — APPOINTMENT (OUTPATIENT)
Dept: LAB | Facility: CLINIC | Age: 61
End: 2021-10-29
Payer: COMMERCIAL

## 2021-10-29 DIAGNOSIS — D72.829 LEUKOCYTOSIS, UNSPECIFIED TYPE: ICD-10-CM

## 2021-10-29 LAB
BASOPHILS # BLD AUTO: 0.06 THOUSANDS/ΜL (ref 0–0.1)
BASOPHILS NFR BLD AUTO: 1 % (ref 0–1)
EOSINOPHIL # BLD AUTO: 0.14 THOUSAND/ΜL (ref 0–0.61)
EOSINOPHIL NFR BLD AUTO: 2 % (ref 0–6)
ERYTHROCYTE [DISTWIDTH] IN BLOOD BY AUTOMATED COUNT: 15 % (ref 11.6–15.1)
HCT VFR BLD AUTO: 41.9 % (ref 36.5–49.3)
HGB BLD-MCNC: 13.5 G/DL (ref 12–17)
IMM GRANULOCYTES # BLD AUTO: 0.02 THOUSAND/UL (ref 0–0.2)
IMM GRANULOCYTES NFR BLD AUTO: 0 % (ref 0–2)
LYMPHOCYTES # BLD AUTO: 2.54 THOUSANDS/ΜL (ref 0.6–4.47)
LYMPHOCYTES NFR BLD AUTO: 43 % (ref 14–44)
MCH RBC QN AUTO: 28 PG (ref 26.8–34.3)
MCHC RBC AUTO-ENTMCNC: 32.2 G/DL (ref 31.4–37.4)
MCV RBC AUTO: 87 FL (ref 82–98)
MONOCYTES # BLD AUTO: 0.52 THOUSAND/ΜL (ref 0.17–1.22)
MONOCYTES NFR BLD AUTO: 9 % (ref 4–12)
NEUTROPHILS # BLD AUTO: 2.62 THOUSANDS/ΜL (ref 1.85–7.62)
NEUTS SEG NFR BLD AUTO: 45 % (ref 43–75)
NRBC BLD AUTO-RTO: 0 /100 WBCS
PLATELET # BLD AUTO: 240 THOUSANDS/UL (ref 149–390)
PMV BLD AUTO: 9.3 FL (ref 8.9–12.7)
RBC # BLD AUTO: 4.83 MILLION/UL (ref 3.88–5.62)
WBC # BLD AUTO: 5.9 THOUSAND/UL (ref 4.31–10.16)

## 2021-10-29 PROCEDURE — 85025 COMPLETE CBC W/AUTO DIFF WBC: CPT

## 2021-10-29 PROCEDURE — 36415 COLL VENOUS BLD VENIPUNCTURE: CPT

## 2021-11-15 ENCOUNTER — TELEMEDICINE (OUTPATIENT)
Dept: FAMILY MEDICINE CLINIC | Facility: CLINIC | Age: 61
End: 2021-11-15
Payer: COMMERCIAL

## 2021-11-15 VITALS — WEIGHT: 214 LBS | TEMPERATURE: 97 F | HEIGHT: 70 IN | BODY MASS INDEX: 30.64 KG/M2

## 2021-11-15 DIAGNOSIS — R51.9 NONINTRACTABLE HEADACHE, UNSPECIFIED CHRONICITY PATTERN, UNSPECIFIED HEADACHE TYPE: ICD-10-CM

## 2021-11-15 DIAGNOSIS — R09.81 SINUS CONGESTION: ICD-10-CM

## 2021-11-15 DIAGNOSIS — R50.9 FEVER, UNSPECIFIED FEVER CAUSE: Primary | ICD-10-CM

## 2021-11-15 PROCEDURE — 3008F BODY MASS INDEX DOCD: CPT | Performed by: FAMILY MEDICINE

## 2021-11-15 PROCEDURE — 99213 OFFICE O/P EST LOW 20 MIN: CPT | Performed by: FAMILY MEDICINE

## 2021-11-15 PROCEDURE — U0003 INFECTIOUS AGENT DETECTION BY NUCLEIC ACID (DNA OR RNA); SEVERE ACUTE RESPIRATORY SYNDROME CORONAVIRUS 2 (SARS-COV-2) (CORONAVIRUS DISEASE [COVID-19]), AMPLIFIED PROBE TECHNIQUE, MAKING USE OF HIGH THROUGHPUT TECHNOLOGIES AS DESCRIBED BY CMS-2020-01-R: HCPCS | Performed by: FAMILY MEDICINE

## 2021-11-15 PROCEDURE — U0005 INFEC AGEN DETEC AMPLI PROBE: HCPCS | Performed by: FAMILY MEDICINE

## 2021-11-16 PROBLEM — U07.1 COVID-19 VIRUS INFECTION: Status: ACTIVE | Noted: 2021-11-16

## 2021-11-18 ENCOUNTER — TELEMEDICINE (OUTPATIENT)
Dept: FAMILY MEDICINE CLINIC | Facility: CLINIC | Age: 61
End: 2021-11-18
Payer: COMMERCIAL

## 2021-11-18 VITALS — TEMPERATURE: 97 F

## 2021-11-18 DIAGNOSIS — U07.1 COVID-19 VIRUS INFECTION: Primary | ICD-10-CM

## 2021-11-18 PROCEDURE — 99212 OFFICE O/P EST SF 10 MIN: CPT | Performed by: FAMILY MEDICINE

## 2021-11-18 PROCEDURE — 1036F TOBACCO NON-USER: CPT | Performed by: FAMILY MEDICINE

## 2021-11-30 DIAGNOSIS — I10 ESSENTIAL HYPERTENSION: ICD-10-CM

## 2021-11-30 RX ORDER — AMLODIPINE BESYLATE 10 MG/1
TABLET ORAL
Qty: 90 TABLET | Refills: 0 | Status: SHIPPED | OUTPATIENT
Start: 2021-11-30 | End: 2022-02-27

## 2021-12-11 ENCOUNTER — IMMUNIZATIONS (OUTPATIENT)
Dept: FAMILY MEDICINE CLINIC | Facility: HOSPITAL | Age: 61
End: 2021-12-11

## 2021-12-11 DIAGNOSIS — Z23 ENCOUNTER FOR IMMUNIZATION: Primary | ICD-10-CM

## 2021-12-11 PROCEDURE — 0001A COVID-19 PFIZER VACC 0.3 ML: CPT

## 2021-12-11 PROCEDURE — 91300 COVID-19 PFIZER VACC 0.3 ML: CPT

## 2022-02-27 DIAGNOSIS — E55.9 VITAMIN D DEFICIENCY: ICD-10-CM

## 2022-02-27 DIAGNOSIS — I10 ESSENTIAL HYPERTENSION: ICD-10-CM

## 2022-02-27 RX ORDER — ERGOCALCIFEROL 1.25 MG/1
CAPSULE ORAL
Qty: 8 CAPSULE | Refills: 0 | Status: SHIPPED | OUTPATIENT
Start: 2022-02-27 | End: 2022-03-26

## 2022-02-27 RX ORDER — AMLODIPINE BESYLATE 10 MG/1
TABLET ORAL
Qty: 90 TABLET | Refills: 0 | Status: SHIPPED | OUTPATIENT
Start: 2022-02-27 | End: 2022-05-30

## 2022-03-02 DIAGNOSIS — E78.5 HYPERLIPIDEMIA, UNSPECIFIED HYPERLIPIDEMIA TYPE: ICD-10-CM

## 2022-03-02 RX ORDER — ATORVASTATIN CALCIUM 20 MG/1
TABLET, FILM COATED ORAL
Qty: 90 TABLET | Refills: 3 | Status: SHIPPED | OUTPATIENT
Start: 2022-03-02

## 2022-03-25 DIAGNOSIS — E55.9 VITAMIN D DEFICIENCY: ICD-10-CM

## 2022-03-26 RX ORDER — ERGOCALCIFEROL 1.25 MG/1
CAPSULE ORAL
Qty: 4 CAPSULE | Refills: 1 | Status: SHIPPED | OUTPATIENT
Start: 2022-03-26

## 2022-04-04 DIAGNOSIS — E55.9 VITAMIN D DEFICIENCY: ICD-10-CM

## 2022-04-04 NOTE — TELEPHONE ENCOUNTER
Patient L/m on Rx line requesting-    cholecalciferol (VITAMIN D3) 1,000 units tablet  #90 / R-3    Patient reports he has completed the high-dose Vitamin D2      Last OV 11/18/21  Future OV 8/25/22

## 2022-04-06 RX ORDER — MELATONIN
1000 DAILY
Qty: 90 TABLET | Refills: 3 | Status: SHIPPED | OUTPATIENT
Start: 2022-04-06 | End: 2022-07-05

## 2022-05-30 DIAGNOSIS — I10 ESSENTIAL HYPERTENSION: ICD-10-CM

## 2022-05-30 RX ORDER — AMLODIPINE BESYLATE 10 MG/1
TABLET ORAL
Qty: 90 TABLET | Refills: 0 | Status: SHIPPED | OUTPATIENT
Start: 2022-05-30 | End: 2022-07-24

## 2022-09-13 ENCOUNTER — OFFICE VISIT (OUTPATIENT)
Dept: FAMILY MEDICINE CLINIC | Facility: CLINIC | Age: 62
End: 2022-09-13
Payer: COMMERCIAL

## 2022-09-13 VITALS
WEIGHT: 216 LBS | SYSTOLIC BLOOD PRESSURE: 138 MMHG | HEART RATE: 78 BPM | BODY MASS INDEX: 30.92 KG/M2 | RESPIRATION RATE: 12 BRPM | HEIGHT: 70 IN | DIASTOLIC BLOOD PRESSURE: 80 MMHG | OXYGEN SATURATION: 98 %

## 2022-09-13 DIAGNOSIS — I10 PRIMARY HYPERTENSION: ICD-10-CM

## 2022-09-13 DIAGNOSIS — Z23 NEED FOR PROPHYLACTIC VACCINATION AGAINST STREPTOCOCCUS PNEUMONIAE (PNEUMOCOCCUS): ICD-10-CM

## 2022-09-13 DIAGNOSIS — Z12.5 PROSTATE CANCER SCREENING: ICD-10-CM

## 2022-09-13 DIAGNOSIS — G62.9 NEUROPATHY: ICD-10-CM

## 2022-09-13 DIAGNOSIS — E55.9 VITAMIN D DEFICIENCY: ICD-10-CM

## 2022-09-13 DIAGNOSIS — Z00.00 ANNUAL PHYSICAL EXAM: Primary | ICD-10-CM

## 2022-09-13 DIAGNOSIS — E66.9 OBESITY (BMI 30-39.9): ICD-10-CM

## 2022-09-13 DIAGNOSIS — Z13.89 SCREENING FOR HEMATURIA OR PROTEINURIA: ICD-10-CM

## 2022-09-13 PROCEDURE — 90677 PCV20 VACCINE IM: CPT

## 2022-09-13 PROCEDURE — 99396 PREV VISIT EST AGE 40-64: CPT | Performed by: INTERNAL MEDICINE

## 2022-09-13 PROCEDURE — 3725F SCREEN DEPRESSION PERFORMED: CPT | Performed by: INTERNAL MEDICINE

## 2022-09-13 PROCEDURE — 90471 IMMUNIZATION ADMIN: CPT

## 2022-09-13 RX ORDER — GABAPENTIN 300 MG/1
300 CAPSULE ORAL
Qty: 90 CAPSULE | Refills: 2 | Status: SHIPPED | OUTPATIENT
Start: 2022-09-13 | End: 2023-09-13

## 2022-09-13 RX ORDER — MELATONIN
1000 DAILY
Qty: 90 TABLET | Refills: 3 | Status: SHIPPED | OUTPATIENT
Start: 2022-09-13 | End: 2022-12-12

## 2022-09-14 NOTE — PROGRESS NOTES
Assessment/Plan:    No problem-specific Assessment & Plan notes found for this encounter  Diagnoses and all orders for this visit:    Annual physical exam    Primary hypertension  -     Comprehensive metabolic panel; Future    Obesity (BMI 30-39 9)  -     Lipid panel; Future    Vitamin D deficiency  -     Vitamin D 25 hydroxy; Future  -     cholecalciferol (VITAMIN D3) 1,000 units tablet; Take 1 tablet (1,000 Units total) by mouth daily Start after done with the high-dose  Prostate cancer screening  -     PSA, Total Screen; Future    Screening for hematuria or proteinuria  -     UA (URINE) with reflex to Scope    Need for prophylactic vaccination against Streptococcus pneumoniae (pneumococcus)  -     Pneumococcal Conjugate Vaccine 20-valent (Pcv20)    Neuropathy  -     gabapentin (NEURONTIN) 300 mg capsule; Take 1 capsule (300 mg total) by mouth daily at bedtime          Subjective:      Patient ID: Suzan Guthrie is a 64 y o  male  Patient came for annual checkup  No active complaints  Up-to-date on PSA  Had colonoscopy in 2019 with recommendation to repeat in 3 years, he will try to schedule  Agreed for Pneumovax  The following portions of the patient's history were reviewed and updated as appropriate: allergies, current medications, past family history, past medical history, past social history, past surgical history, and problem list     Review of Systems   Constitutional: Negative for chills, fatigue and fever  Respiratory: Negative for cough, chest tightness and shortness of breath  Cardiovascular: Negative for chest pain, palpitations and leg swelling  Gastrointestinal: Negative for abdominal distention, abdominal pain, blood in stool, constipation, diarrhea and nausea  Genitourinary: Negative for difficulty urinating and dysuria  Musculoskeletal: Negative for arthralgias, back pain, gait problem, joint swelling, myalgias and neck pain  Skin: Negative for rash  Neurological: Negative for dizziness, weakness, numbness and headaches  Psychiatric/Behavioral: Negative for agitation  Objective:      /80 (BP Location: Left arm, Patient Position: Sitting, Cuff Size: Standard)   Pulse 78   Resp 12   Ht 5' 10" (1 778 m)   Wt 98 kg (216 lb)   SpO2 98%   BMI 30 99 kg/m²     Allergies   Allergen Reactions    Celecoxib Syncope          Current Outpatient Medications:     amLODIPine (NORVASC) 10 mg tablet, TAKE 1 TABLET BY MOUTH EVERY DAY, Disp: 30 tablet, Rfl: 0    atorvastatin (LIPITOR) 20 mg tablet, TAKE 1 TABLET BY MOUTH EVERY DAY, Disp: 90 tablet, Rfl: 3    cholecalciferol (VITAMIN D3) 1,000 units tablet, Take 1 tablet (1,000 Units total) by mouth daily Start after done with the high-dose , Disp: 90 tablet, Rfl: 3    ergocalciferol (VITAMIN D2) 50,000 units, TAKE 1 CAPSULE (50,000 UNITS TOTAL) BY MOUTH ONCE A WEEK FOR 8 DOSES, Disp: 4 capsule, Rfl: 1    fluticasone (FLONASE) 50 mcg/act nasal spray, 2 sprays into each nostril daily as needed , Disp: , Rfl:     gabapentin (NEURONTIN) 300 mg capsule, Take 1 capsule (300 mg total) by mouth daily at bedtime, Disp: 90 capsule, Rfl: 2    naproxen (NAPROSYN) 500 mg tablet, TAKE 1 TABLET (500 MG TOTAL) BY MOUTH 2 (TWO) TIMES A DAY AS NEEDED FOR HEADACHES, Disp: 60 tablet, Rfl: 1    sildenafil (VIAGRA) 50 MG tablet, TAKE 1 TAB BY MOUTH AS NEEDED 1 HOUR BEFORE SEX  MAX 1/48 HOURS  THIS IS NOT A DAILY MEDICINE , Disp: , Rfl:      There are no Patient Instructions on file for this visit  Physical Exam  Vitals reviewed  Constitutional:       General: He is not in acute distress  Appearance: He is not toxic-appearing  HENT:      Head: Normocephalic  Cardiovascular:      Rate and Rhythm: Normal rate  Pulses: Normal pulses  Heart sounds: No murmur heard  No gallop  Pulmonary:      Effort: Pulmonary effort is normal  No respiratory distress  Skin:     General: Skin is warm  Neurological:      General: No focal deficit present  Mental Status: He is alert     Psychiatric:         Mood and Affect: Mood normal          Behavior: Behavior normal

## 2022-09-27 ENCOUNTER — OFFICE VISIT (OUTPATIENT)
Dept: FAMILY MEDICINE CLINIC | Facility: CLINIC | Age: 62
End: 2022-09-27
Payer: COMMERCIAL

## 2022-09-27 VITALS
DIASTOLIC BLOOD PRESSURE: 80 MMHG | HEART RATE: 70 BPM | RESPIRATION RATE: 12 BRPM | SYSTOLIC BLOOD PRESSURE: 136 MMHG | OXYGEN SATURATION: 99 % | BODY MASS INDEX: 30.9 KG/M2 | HEIGHT: 70 IN | WEIGHT: 215.8 LBS

## 2022-09-27 DIAGNOSIS — R10.32 INGUINAL PAIN, LEFT: Primary | ICD-10-CM

## 2022-09-27 PROCEDURE — 99213 OFFICE O/P EST LOW 20 MIN: CPT | Performed by: INTERNAL MEDICINE

## 2022-09-27 NOTE — ASSESSMENT & PLAN NOTE
Physical exam is normal, negative for inguinal hernias  Testicles are normal in size, no tenderness over epididymi  Denies any urinary symptoms or abdominal pain  Will watch will observe for now, if no resolution in few weeks or if any worsening of his symptoms he will let me know immediately  Etiology might be likely musculoskeletal   If no improvement will consider further imaging likely with ultrasound

## 2022-09-27 NOTE — PROGRESS NOTES
Assessment/Plan:    Inguinal pain, left  Physical exam is normal, negative for inguinal hernias  Testicles are normal in size, no tenderness over epididymi  Denies any urinary symptoms or abdominal pain  Will watch will observe for now, if no resolution in few weeks or if any worsening of his symptoms he will let me know immediately  Etiology might be likely musculoskeletal   If no improvement will consider further imaging likely with ultrasound  Diagnoses and all orders for this visit:    Inguinal pain, left          Subjective:      Patient ID: Ed Sousa is a 64 y o  male  Patient came today with very nonspecific complaints of discomfort in his left inguinal area  He said that when he goes to the bathroom he is trying to press at this level and he feels that something is bubbling there  He denies any chills, fevers, diarrhea, constipation, blood in his urine or stool  No frequency of urination no burning sensation with that  Groin Pain  The patient's pertinent negatives include no penile discharge, penile pain or scrotal swelling  Pertinent negatives include no abdominal pain, constipation, diarrhea, dysuria, flank pain, frequency, nausea, urgency or vomiting  Ankle Swelling  Pertinent negatives include no abdominal pain, nausea or vomiting  The symptoms are aggravated by movement  The following portions of the patient's history were reviewed and updated as appropriate: allergies, current medications, past family history, past medical history, past social history, past surgical history, and problem list     Review of Systems   Gastrointestinal: Negative for abdominal distention, abdominal pain, blood in stool, constipation, diarrhea, nausea, rectal pain and vomiting  Genitourinary: Negative for decreased urine volume, difficulty urinating, dysuria, enuresis, flank pain, frequency, hematuria, penile discharge, penile pain, penile swelling, scrotal swelling and urgency  Objective:      /80 (BP Location: Left arm, Patient Position: Sitting, Cuff Size: Standard)   Pulse 70   Resp 12   Ht 5' 10" (1 778 m)   Wt 97 9 kg (215 lb 12 8 oz)   SpO2 99%   BMI 30 96 kg/m²     Allergies   Allergen Reactions    Celecoxib Syncope          Current Outpatient Medications:     amLODIPine (NORVASC) 10 mg tablet, TAKE 1 TABLET BY MOUTH EVERY DAY, Disp: 30 tablet, Rfl: 0    atorvastatin (LIPITOR) 20 mg tablet, TAKE 1 TABLET BY MOUTH EVERY DAY, Disp: 90 tablet, Rfl: 3    cholecalciferol (VITAMIN D3) 1,000 units tablet, Take 1 tablet (1,000 Units total) by mouth daily Start after done with the high-dose , Disp: 90 tablet, Rfl: 3    ergocalciferol (VITAMIN D2) 50,000 units, TAKE 1 CAPSULE (50,000 UNITS TOTAL) BY MOUTH ONCE A WEEK FOR 8 DOSES, Disp: 4 capsule, Rfl: 1    fluticasone (FLONASE) 50 mcg/act nasal spray, 2 sprays into each nostril daily as needed , Disp: , Rfl:     gabapentin (NEURONTIN) 300 mg capsule, Take 1 capsule (300 mg total) by mouth daily at bedtime, Disp: 90 capsule, Rfl: 2    naproxen (NAPROSYN) 500 mg tablet, TAKE 1 TABLET (500 MG TOTAL) BY MOUTH 2 (TWO) TIMES A DAY AS NEEDED FOR HEADACHES, Disp: 60 tablet, Rfl: 1    sildenafil (VIAGRA) 50 MG tablet, TAKE 1 TAB BY MOUTH AS NEEDED 1 HOUR BEFORE SEX  MAX 1/48 HOURS  THIS IS NOT A DAILY MEDICINE , Disp: , Rfl:      There are no Patient Instructions on file for this visit  Physical Exam  Constitutional:       General: He is not in acute distress  Appearance: He is not ill-appearing or toxic-appearing  Abdominal:      General: There is no distension  Tenderness: There is no abdominal tenderness  There is no right CVA tenderness, left CVA tenderness, guarding or rebound  Hernia: No hernia is present     Genitourinary:     Penis: Normal        Testes: Normal          Answers for HPI/ROS submitted by the patient on 9/26/2022  Incident location: other  Injury mechanism: unknown  Pain quality: aching  Pain - numeric: 8/10  Pain course: fluctuating  tingling: Yes  Foreign body present: no foreign bodies

## 2022-10-10 ENCOUNTER — APPOINTMENT (OUTPATIENT)
Dept: LAB | Facility: CLINIC | Age: 62
End: 2022-10-10
Payer: COMMERCIAL

## 2022-10-10 DIAGNOSIS — E55.9 VITAMIN D DEFICIENCY: ICD-10-CM

## 2022-10-10 DIAGNOSIS — Z80.0 FAMILY HISTORY OF COLON CANCER: ICD-10-CM

## 2022-10-10 DIAGNOSIS — I10 PRIMARY HYPERTENSION: ICD-10-CM

## 2022-10-10 DIAGNOSIS — Z12.5 PROSTATE CANCER SCREENING: ICD-10-CM

## 2022-10-10 DIAGNOSIS — E66.9 OBESITY (BMI 30-39.9): ICD-10-CM

## 2022-10-10 LAB
25(OH)D3 SERPL-MCNC: 28.6 NG/ML (ref 30–100)
ALBUMIN SERPL BCP-MCNC: 3.6 G/DL (ref 3.5–5)
ALP SERPL-CCNC: 124 U/L (ref 46–116)
ALT SERPL W P-5'-P-CCNC: 31 U/L (ref 12–78)
ANION GAP SERPL CALCULATED.3IONS-SCNC: 6 MMOL/L (ref 4–13)
AST SERPL W P-5'-P-CCNC: 20 U/L (ref 5–45)
BILIRUB SERPL-MCNC: 0.51 MG/DL (ref 0.2–1)
BILIRUB UR QL STRIP: NEGATIVE
BUN SERPL-MCNC: 13 MG/DL (ref 5–25)
CALCIUM SERPL-MCNC: 9.5 MG/DL (ref 8.3–10.1)
CHLORIDE SERPL-SCNC: 107 MMOL/L (ref 96–108)
CHOLEST SERPL-MCNC: 163 MG/DL
CLARITY UR: CLEAR
CO2 SERPL-SCNC: 28 MMOL/L (ref 21–32)
COLOR UR: COLORLESS
CREAT SERPL-MCNC: 1.02 MG/DL (ref 0.6–1.3)
ERYTHROCYTE [DISTWIDTH] IN BLOOD BY AUTOMATED COUNT: 15.2 % (ref 11.6–15.1)
GFR SERPL CREATININE-BSD FRML MDRD: 78 ML/MIN/1.73SQ M
GLUCOSE P FAST SERPL-MCNC: 103 MG/DL (ref 65–99)
GLUCOSE UR STRIP-MCNC: NEGATIVE MG/DL
HCT VFR BLD AUTO: 44.5 % (ref 36.5–49.3)
HDLC SERPL-MCNC: 80 MG/DL
HGB BLD-MCNC: 13.9 G/DL (ref 12–17)
HGB UR QL STRIP.AUTO: NEGATIVE
KETONES UR STRIP-MCNC: NEGATIVE MG/DL
LDLC SERPL CALC-MCNC: 71 MG/DL (ref 0–100)
LEUKOCYTE ESTERASE UR QL STRIP: NEGATIVE
MCH RBC QN AUTO: 27.7 PG (ref 26.8–34.3)
MCHC RBC AUTO-ENTMCNC: 31.2 G/DL (ref 31.4–37.4)
MCV RBC AUTO: 89 FL (ref 82–98)
NITRITE UR QL STRIP: NEGATIVE
NONHDLC SERPL-MCNC: 83 MG/DL
PH UR STRIP.AUTO: 6.5 [PH]
PLATELET # BLD AUTO: 225 THOUSANDS/UL (ref 149–390)
PMV BLD AUTO: 9.4 FL (ref 8.9–12.7)
POTASSIUM SERPL-SCNC: 3.8 MMOL/L (ref 3.5–5.3)
PROT SERPL-MCNC: 7.5 G/DL (ref 6.4–8.4)
PROT UR STRIP-MCNC: NEGATIVE MG/DL
PSA SERPL-MCNC: 2.7 NG/ML (ref 0–4)
RBC # BLD AUTO: 5.02 MILLION/UL (ref 3.88–5.62)
SODIUM SERPL-SCNC: 141 MMOL/L (ref 135–147)
SP GR UR STRIP.AUTO: 1.01 (ref 1–1.03)
TRIGL SERPL-MCNC: 60 MG/DL
UROBILINOGEN UR STRIP-ACNC: <2 MG/DL
WBC # BLD AUTO: 5.81 THOUSAND/UL (ref 4.31–10.16)

## 2022-10-10 PROCEDURE — 36415 COLL VENOUS BLD VENIPUNCTURE: CPT

## 2022-10-10 PROCEDURE — 80053 COMPREHEN METABOLIC PANEL: CPT

## 2022-10-10 PROCEDURE — 80061 LIPID PANEL: CPT

## 2022-10-10 PROCEDURE — 85027 COMPLETE CBC AUTOMATED: CPT

## 2022-10-10 PROCEDURE — G0103 PSA SCREENING: HCPCS

## 2022-10-10 PROCEDURE — 81003 URINALYSIS AUTO W/O SCOPE: CPT | Performed by: INTERNAL MEDICINE

## 2022-10-10 PROCEDURE — 82306 VITAMIN D 25 HYDROXY: CPT
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
## 2022-10-21 ENCOUNTER — IMMUNIZATIONS (OUTPATIENT)
Dept: FAMILY MEDICINE CLINIC | Facility: CLINIC | Age: 62
End: 2022-10-21
Payer: COMMERCIAL

## 2022-10-21 DIAGNOSIS — Z23 NEED FOR INFLUENZA VACCINATION: Primary | ICD-10-CM

## 2022-10-21 PROCEDURE — 90682 RIV4 VACC RECOMBINANT DNA IM: CPT

## 2022-10-21 PROCEDURE — 90471 IMMUNIZATION ADMIN: CPT

## 2023-01-20 DIAGNOSIS — I10 ESSENTIAL HYPERTENSION: ICD-10-CM

## 2023-01-20 RX ORDER — AMLODIPINE BESYLATE 10 MG/1
TABLET ORAL
Qty: 90 TABLET | Refills: 0 | Status: SHIPPED | OUTPATIENT
Start: 2023-01-20

## 2023-02-28 DIAGNOSIS — E78.5 HYPERLIPIDEMIA, UNSPECIFIED HYPERLIPIDEMIA TYPE: ICD-10-CM

## 2023-02-28 RX ORDER — ATORVASTATIN CALCIUM 20 MG/1
TABLET, FILM COATED ORAL
Qty: 90 TABLET | Refills: 3 | Status: SHIPPED | OUTPATIENT
Start: 2023-02-28

## 2023-03-31 ENCOUNTER — RA CDI HCC (OUTPATIENT)
Dept: OTHER | Facility: HOSPITAL | Age: 63
End: 2023-03-31

## 2023-03-31 NOTE — PROGRESS NOTES
[facility]  coding opportunities       Chart reviewed, no opportunity found: CHART REVIEWED, NO OPPORTUNITY FOUND        Patients Insurance        Commercial Insurance: [employer]

## 2023-04-07 ENCOUNTER — OFFICE VISIT (OUTPATIENT)
Dept: FAMILY MEDICINE CLINIC | Facility: CLINIC | Age: 63
End: 2023-04-07

## 2023-04-07 VITALS
TEMPERATURE: 96.3 F | BODY MASS INDEX: 30.35 KG/M2 | WEIGHT: 212 LBS | HEART RATE: 74 BPM | HEIGHT: 70 IN | DIASTOLIC BLOOD PRESSURE: 78 MMHG | SYSTOLIC BLOOD PRESSURE: 130 MMHG | OXYGEN SATURATION: 99 % | RESPIRATION RATE: 18 BRPM

## 2023-04-07 DIAGNOSIS — S61.219A CUT OF FINGER: ICD-10-CM

## 2023-04-07 DIAGNOSIS — R73.03 PREDIABETES: ICD-10-CM

## 2023-04-07 DIAGNOSIS — E78.5 HYPERLIPIDEMIA, UNSPECIFIED HYPERLIPIDEMIA TYPE: ICD-10-CM

## 2023-04-07 DIAGNOSIS — I10 PRIMARY HYPERTENSION: Primary | ICD-10-CM

## 2023-04-07 NOTE — PROGRESS NOTES
"Assessment/Plan:    Hypertension  Very well controlled on current management with Norvasc  Continue the same  Hyperlipidemia  Controlled on atorvastatin 20 mg daily  Prediabetes  Recently was okay controlled, we will recheck his hemoglobin A1c with the next blood work for his annual checkup in 6 months  Diagnoses and all orders for this visit:    Primary hypertension    Prediabetes    Hyperlipidemia, unspecified hyperlipidemia type    Cut of finger  Comments:  Noninfected, he will keep it clean, up-to-date on tetanus shot  If any redness or pain he will let me know  Subjective:      Patient ID: Ousmane Gallagher is a 58 y o  male  Patient came today to follow-up on his chronic problems including hypertension, prediabetes, hyperlipidemia  The following portions of the patient's history were reviewed and updated as appropriate: allergies, current medications, past family history, past medical history, past social history, past surgical history, and problem list     Review of Systems   Constitutional: Negative for chills, fatigue and fever  Respiratory: Negative for cough, chest tightness and shortness of breath  Cardiovascular: Negative for chest pain, palpitations and leg swelling  Gastrointestinal: Negative for abdominal distention, abdominal pain, blood in stool, constipation, diarrhea and nausea  Genitourinary: Negative for difficulty urinating and dysuria  Musculoskeletal: Negative for arthralgias, back pain, gait problem, joint swelling, myalgias and neck pain  Skin: Positive for wound  Negative for rash  Neurological: Negative for dizziness, weakness, numbness and headaches  Psychiatric/Behavioral: Negative for agitation           Objective:      /78 (BP Location: Left arm, Cuff Size: Large)   Pulse 74   Temp (!) 96 3 °F (35 7 °C) (Tympanic)   Resp 18   Ht 5' 10\" (1 778 m)   Wt 96 2 kg (212 lb)   SpO2 99%   BMI 30 42 kg/m²     Allergies   Allergen " Reactions   • Celecoxib Syncope          Current Outpatient Medications:   •  amLODIPine (NORVASC) 10 mg tablet, TAKE 1 TABLET BY MOUTH EVERY DAY, Disp: 90 tablet, Rfl: 0  •  atorvastatin (LIPITOR) 20 mg tablet, TAKE 1 TABLET BY MOUTH EVERY DAY, Disp: 90 tablet, Rfl: 3  •  ergocalciferol (VITAMIN D2) 50,000 units, TAKE 1 CAPSULE (50,000 UNITS TOTAL) BY MOUTH ONCE A WEEK FOR 8 DOSES, Disp: 4 capsule, Rfl: 1  •  fluticasone (FLONASE) 50 mcg/act nasal spray, 2 sprays into each nostril daily as needed, Disp: , Rfl:   •  gabapentin (NEURONTIN) 300 mg capsule, Take 1 capsule (300 mg total) by mouth daily at bedtime, Disp: 90 capsule, Rfl: 2  •  naproxen (NAPROSYN) 500 mg tablet, TAKE 1 TABLET (500 MG TOTAL) BY MOUTH 2 (TWO) TIMES A DAY AS NEEDED FOR HEADACHES, Disp: 60 tablet, Rfl: 1  •  sildenafil (VIAGRA) 50 MG tablet, , Disp: , Rfl:   •  cholecalciferol (VITAMIN D3) 1,000 units tablet, Take 1 tablet (1,000 Units total) by mouth daily Start after done with the high-dose , Disp: 90 tablet, Rfl: 3  •  Na Sulfate-K Sulfate-Mg Sulf (Suprep Bowel Prep Kit) 17 5-3 13-1 6 GM/177ML SOLN, Follow office instructions, Disp: 177 mL, Rfl: 0     There are no Patient Instructions on file for this visit  Physical Exam  Constitutional:       Appearance: He is not ill-appearing  HENT:      Head: Normocephalic and atraumatic  Nose: No congestion or rhinorrhea  Eyes:      Pupils: Pupils are equal, round, and reactive to light  Cardiovascular:      Rate and Rhythm: Normal rate and regular rhythm  Pulses: Normal pulses  Heart sounds: Normal heart sounds  No murmur heard  No friction rub  No gallop  Pulmonary:      Effort: Pulmonary effort is normal  No respiratory distress  Breath sounds: Normal breath sounds  No wheezing or rales  Chest:      Chest wall: No tenderness  Abdominal:      General: Bowel sounds are normal  There is no distension  Palpations: Abdomen is soft  There is no mass  Tenderness: There is no abdominal tenderness  There is no rebound  Hernia: No hernia is present  Musculoskeletal:         General: No swelling, tenderness or deformity  Cervical back: No rigidity  No muscular tenderness  Skin:     Coloration: Skin is not pale  Findings: Lesion (8 mm cut on the right index finger, does not look infected ) present  No erythema or rash  Neurological:      Mental Status: He is alert and oriented to person, place, and time  Sensory: No sensory deficit  Motor: No weakness     Psychiatric:         Mood and Affect: Mood normal          Behavior: Behavior normal

## 2023-04-07 NOTE — ASSESSMENT & PLAN NOTE
Recently was okay controlled, we will recheck his hemoglobin A1c with the next blood work for his annual checkup in 6 months

## 2023-05-25 DIAGNOSIS — E55.9 VITAMIN D DEFICIENCY: ICD-10-CM

## 2023-05-26 RX ORDER — ERGOCALCIFEROL 1.25 MG/1
50000 CAPSULE ORAL WEEKLY
Qty: 4 CAPSULE | Refills: 0 | Status: SHIPPED | OUTPATIENT
Start: 2023-05-26

## 2023-06-10 DIAGNOSIS — G62.9 NEUROPATHY: ICD-10-CM

## 2023-06-10 RX ORDER — GABAPENTIN 300 MG/1
CAPSULE ORAL
Qty: 90 CAPSULE | Refills: 2 | Status: SHIPPED | OUTPATIENT
Start: 2023-06-10

## 2023-06-15 ENCOUNTER — APPOINTMENT (OUTPATIENT)
Dept: LAB | Facility: CLINIC | Age: 63
End: 2023-06-15
Payer: COMMERCIAL

## 2023-06-15 ENCOUNTER — OFFICE VISIT (OUTPATIENT)
Dept: FAMILY MEDICINE CLINIC | Facility: CLINIC | Age: 63
End: 2023-06-15
Payer: COMMERCIAL

## 2023-06-15 VITALS
WEIGHT: 214 LBS | RESPIRATION RATE: 12 BRPM | TEMPERATURE: 97.7 F | HEART RATE: 80 BPM | DIASTOLIC BLOOD PRESSURE: 62 MMHG | SYSTOLIC BLOOD PRESSURE: 110 MMHG | BODY MASS INDEX: 30.64 KG/M2 | OXYGEN SATURATION: 99 % | HEIGHT: 70 IN

## 2023-06-15 DIAGNOSIS — N45.1 EPIDIDYMITIS: ICD-10-CM

## 2023-06-15 DIAGNOSIS — Z12.5 PROSTATE CANCER SCREENING: Primary | ICD-10-CM

## 2023-06-15 DIAGNOSIS — Z12.5 PROSTATE CANCER SCREENING: ICD-10-CM

## 2023-06-15 LAB
BILIRUB UR QL STRIP: NEGATIVE
CLARITY UR: CLEAR
COLOR UR: COLORLESS
GLUCOSE UR STRIP-MCNC: NEGATIVE MG/DL
HGB UR QL STRIP.AUTO: NEGATIVE
KETONES UR STRIP-MCNC: NEGATIVE MG/DL
LEUKOCYTE ESTERASE UR QL STRIP: NEGATIVE
NITRITE UR QL STRIP: NEGATIVE
PH UR STRIP.AUTO: 6.5 [PH]
PROT UR STRIP-MCNC: NEGATIVE MG/DL
PSA SERPL-MCNC: 3.66 NG/ML (ref 0–4)
SP GR UR STRIP.AUTO: 1.01 (ref 1–1.03)
UROBILINOGEN UR STRIP-ACNC: <2 MG/DL

## 2023-06-15 PROCEDURE — 36415 COLL VENOUS BLD VENIPUNCTURE: CPT

## 2023-06-15 PROCEDURE — 81003 URINALYSIS AUTO W/O SCOPE: CPT | Performed by: INTERNAL MEDICINE

## 2023-06-15 PROCEDURE — 87591 N.GONORRHOEAE DNA AMP PROB: CPT

## 2023-06-15 PROCEDURE — 99214 OFFICE O/P EST MOD 30 MIN: CPT | Performed by: INTERNAL MEDICINE

## 2023-06-15 PROCEDURE — G0103 PSA SCREENING: HCPCS

## 2023-06-15 PROCEDURE — 87491 CHLMYD TRACH DNA AMP PROBE: CPT

## 2023-06-15 NOTE — ASSESSMENT & PLAN NOTE
Clinical presentation consistent with epididymitis  No urinary symptoms report significant discomfort in his scrotum on the left side, epididymis is very tender to palpation on the left side  We will do UA, STD screen, ultrasound of the scrotum to exclude any secondary cause  Hold off on treatment as for now okay to use NSAIDs as needed  Further management after the results

## 2023-06-15 NOTE — PROGRESS NOTES
"Assessment/Plan:    Epididymitis  Clinical presentation consistent with epididymitis  No urinary symptoms report significant discomfort in his scrotum on the left side, epididymis is very tender to palpation on the left side  We will do UA, STD screen, ultrasound of the scrotum to exclude any secondary cause  Hold off on treatment as for now okay to use NSAIDs as needed  Further management after the results  Diagnoses and all orders for this visit:    Prostate cancer screening  -     PSA, Total Screen; Future    Epididymitis  -     UA (URINE) with reflex to Scope  -     Chlamydia/GC amplified DNA by PCR; Future  -     US scrotum and testicles; Future          Subjective:      Patient ID: Valentina Escalante is a 58 y o  male  Him today with a new complaint of a pain in his scrotum that he has already for close to a year  Ankle Swelling  Pertinent negatives include no chills or fever  The symptoms are aggravated by movement  The following portions of the patient's history were reviewed and updated as appropriate: allergies, current medications, past family history, past medical history, past social history, past surgical history, and problem list     Review of Systems   Constitutional: Negative for chills and fever  Genitourinary: Positive for testicular pain  Negative for dysuria, genital sores, penile pain, penile swelling and scrotal swelling           Objective:      /62 (BP Location: Right arm, Patient Position: Sitting, Cuff Size: Large)   Pulse 80   Temp 97 7 °F (36 5 °C) (Temporal)   Resp 12   Ht 5' 10\" (1 778 m)   Wt 97 1 kg (214 lb)   SpO2 99%   BMI 30 71 kg/m²     Allergies   Allergen Reactions   • Celecoxib Syncope          Current Outpatient Medications:   •  amLODIPine (NORVASC) 10 mg tablet, TAKE 1 TABLET BY MOUTH EVERY DAY, Disp: 90 tablet, Rfl: 0  •  atorvastatin (LIPITOR) 20 mg tablet, TAKE 1 TABLET BY MOUTH EVERY DAY, Disp: 90 tablet, Rfl: 3  •  cholecalciferol " (VITAMIN D3) 1,000 units tablet, Take 1 tablet (1,000 Units total) by mouth daily Start after done with the high-dose , Disp: 90 tablet, Rfl: 3  •  ergocalciferol (VITAMIN D2) 50,000 units, Take 1 capsule (50,000 Units total) by mouth once a week, Disp: 4 capsule, Rfl: 0  •  fluticasone (FLONASE) 50 mcg/act nasal spray, 2 sprays into each nostril daily as needed, Disp: , Rfl:   •  gabapentin (NEURONTIN) 300 mg capsule, TAKE 1 CAPSULE BY MOUTH AT BEDTIME, Disp: 90 capsule, Rfl: 2  •  naproxen (NAPROSYN) 500 mg tablet, TAKE 1 TABLET (500 MG TOTAL) BY MOUTH 2 (TWO) TIMES A DAY AS NEEDED FOR HEADACHES, Disp: 60 tablet, Rfl: 1  •  sildenafil (VIAGRA) 50 MG tablet, , Disp: , Rfl:   •  Na Sulfate-K Sulfate-Mg Sulf (Suprep Bowel Prep Kit) 17 5-3 13-1 6 GM/177ML SOLN, Follow office instructions, Disp: 177 mL, Rfl: 0     There are no Patient Instructions on file for this visit  Physical Exam  Constitutional:       General: He is not in acute distress  Appearance: He is not ill-appearing or toxic-appearing  Abdominal:      General: There is no distension  Tenderness: There is no abdominal tenderness  There is no guarding     Genitourinary:     Penis: Normal        Prostate: Normal       Comments: Significant tenderness of the left epididymis  Psychiatric:         Mood and Affect: Mood normal          Answers for HPI/ROS submitted by the patient on 6/11/2023  Incident occurred: more than 1 week ago  Incident location: at home  Injury mechanism: unknown, other  Pain quality: stabbing  Pain - numeric: 10/10  Pain course: worsening  tingling: Yes  inability to bear weight: Yes  loss of motion: Yes  loss of sensation: Yes  muscle weakness: Yes  Foreign body present: unknown

## 2023-06-16 LAB
C TRACH DNA SPEC QL NAA+PROBE: NEGATIVE
N GONORRHOEA DNA SPEC QL NAA+PROBE: NEGATIVE

## 2023-06-21 ENCOUNTER — HOSPITAL ENCOUNTER (OUTPATIENT)
Dept: ULTRASOUND IMAGING | Facility: HOSPITAL | Age: 63
Discharge: HOME | End: 2023-06-21
Attending: INTERNAL MEDICINE
Payer: COMMERCIAL

## 2023-06-21 DIAGNOSIS — R97.20 INCREASED PROSTATE SPECIFIC ANTIGEN (PSA) VELOCITY: Primary | ICD-10-CM

## 2023-06-21 DIAGNOSIS — N45.1 EPIDIDYMITIS: ICD-10-CM

## 2023-06-21 PROCEDURE — 76870 US EXAM SCROTUM: CPT

## 2023-06-22 ENCOUNTER — TELEPHONE (OUTPATIENT)
Dept: UROLOGY | Facility: AMBULATORY SURGERY CENTER | Age: 63
End: 2023-06-22

## 2023-06-22 NOTE — TELEPHONE ENCOUNTER
Attempted to contact patient   No answer  Left message on voice mail       When patient call back can offer appt in [facility] for August 1st @ 9:00am

## 2023-06-22 NOTE — TELEPHONE ENCOUNTER
New Patient    What is the reason for the patient’s appointment?: NP- PCP ref for Increased prostate specific antigen (PSA) velocity    What office location does the patient prefer?: [facility]/[place]     Does patient have Imaging/Lab Results:    PSA results in chart from 6/15/23    Have patient records been requested?:  If No, are the records showing in Epic: records in epic      INSURANCE:   Do we accept the patient's insurance or is the patient Self-Pay?: yes     Insurance Provider: [address],[facility] Type/Number: [id number]  Member ID#: [id number]      HISTORY:   Has the patient had any previous Urologist(s)?: no     Was the patient seen in the ED?: no     Has the patient had any outside testing done?: no     Does the patient have a personal history of cancer?: no     : [phone]

## 2023-07-03 ENCOUNTER — TELEPHONE (OUTPATIENT)
Dept: FAMILY MEDICINE CLINIC | Facility: CLINIC | Age: 63
End: 2023-07-03

## 2023-07-03 NOTE — TELEPHONE ENCOUNTER
Pt left a vm stating " Caroline Machado said she could give me some anti-inflammatory pills if you can give me a call back.  I really need those pills"

## 2023-07-03 NOTE — TELEPHONE ENCOUNTER
Spoke with patient via phone. Patient made aware that Mahogany Vasquez is out of the office today. Patient c/o scrotal discomfort, pain 10/10. Patient is referencing the anti-inflammatory recommendation provided by Mahogany Vasquez in her result on US scrotum and testicles. Mahogany Vasquez, could you please clarify if patient may take NSAIDs? Patient does have an allergy to Celecoxib, per chart, syncope reaction.

## 2023-07-04 DIAGNOSIS — R51.9 NONINTRACTABLE HEADACHE, UNSPECIFIED CHRONICITY PATTERN, UNSPECIFIED HEADACHE TYPE: ICD-10-CM

## 2023-07-04 RX ORDER — NAPROXEN 500 MG/1
500 TABLET ORAL 2 TIMES DAILY PRN
Qty: 60 TABLET | Refills: 0 | Status: SHIPPED | OUTPATIENT
Start: 2023-07-04 | End: 2023-07-31

## 2023-07-04 NOTE — TELEPHONE ENCOUNTER
It looks like patient has taken naproxen. Script sent for patient to try twice daily with food as needed.

## 2023-07-27 DIAGNOSIS — I10 ESSENTIAL HYPERTENSION: ICD-10-CM

## 2023-07-27 RX ORDER — AMLODIPINE BESYLATE 10 MG/1
TABLET ORAL
Qty: 90 TABLET | Refills: 0 | Status: SHIPPED | OUTPATIENT
Start: 2023-07-27

## 2023-07-31 DIAGNOSIS — R51.9 NONINTRACTABLE HEADACHE, UNSPECIFIED CHRONICITY PATTERN, UNSPECIFIED HEADACHE TYPE: ICD-10-CM

## 2023-07-31 RX ORDER — NAPROXEN 500 MG/1
TABLET ORAL
Qty: 60 TABLET | Refills: 0 | Status: SHIPPED | OUTPATIENT
Start: 2023-07-31 | End: 2023-08-28

## 2023-08-01 ENCOUNTER — OFFICE VISIT (OUTPATIENT)
Dept: UROLOGY | Facility: CLINIC | Age: 63
End: 2023-08-01
Payer: COMMERCIAL

## 2023-08-01 VITALS
HEIGHT: 70 IN | SYSTOLIC BLOOD PRESSURE: 122 MMHG | DIASTOLIC BLOOD PRESSURE: 82 MMHG | HEART RATE: 69 BPM | BODY MASS INDEX: 30.35 KG/M2 | WEIGHT: 212 LBS | OXYGEN SATURATION: 99 %

## 2023-08-01 DIAGNOSIS — K40.90 LEFT INGUINAL HERNIA: ICD-10-CM

## 2023-08-01 DIAGNOSIS — R97.20 ELEVATED PSA: Primary | ICD-10-CM

## 2023-08-01 LAB
SL AMB  POCT GLUCOSE, UA: NORMAL
SL AMB LEUKOCYTE ESTERASE,UA: NORMAL
SL AMB POCT BILIRUBIN,UA: NORMAL
SL AMB POCT BLOOD,UA: NORMAL
SL AMB POCT CLARITY,UA: CLEAR
SL AMB POCT COLOR,UA: YELLOW
SL AMB POCT KETONES,UA: NORMAL
SL AMB POCT NITRITE,UA: NORMAL
SL AMB POCT PH,UA: 6
SL AMB POCT SPECIFIC GRAVITY,UA: 1
SL AMB POCT URINE PROTEIN: NORMAL
SL AMB POCT UROBILINOGEN: 0.2

## 2023-08-01 PROCEDURE — 81002 URINALYSIS NONAUTO W/O SCOPE: CPT | Performed by: UROLOGY

## 2023-08-01 PROCEDURE — 99204 OFFICE O/P NEW MOD 45 MIN: CPT | Performed by: UROLOGY

## 2023-08-01 NOTE — PROGRESS NOTES
UROLOGY NEW CONSULT NOTE     CHIEF COMPLAINT   Clay Carpio is a 58 y.o. male with a complaint of   Chief Complaint   Patient presents with   • New Patient Visit     Elevated PSA       History of Present Illness:   Clay Carpio is a 58 y.o. male here for evaluation of elevated PSA. No family history of prostate cancer. Patient denies significant urinary bother. He does have left inguinal pain and recent scrotal ultrasound demonstrates a mild left varicocele and right epididymal cyst.    Lab Results   Component Value Date    PSA 3.66 06/15/2023    PSA 2.7 10/10/2022    PSA 2.0 09/29/2021     Urinary Score(s)     AUA SYMPTOM SCORE    Flowsheet Row Most Recent Value   AUA SYMPTOM SCORE    How often have you had a sensation of not emptying your bladder completely after you finished urinating? 0 (P)     How often have you had to urinate again less than two hours after you finished urinating? 2 (P)     How often have you found you stopped and started again several times when you urinate? 1 (P)     How often have you found it difficult to postpone urination? 4 (P)     How often have you had a weak urinary stream? 1 (P)     How often have you had to push or strain to begin urination? 0 (P)     How many times did you most typically get up to urinate from the time you went to bed at night until the time you got up in the morning? 1 (P)     Quality of Life: If you were to spend the rest of your life with your urinary condition just the way it is now, how would you feel about that? 2 (P)     AUA SYMPTOM SCORE 9 (P)              Past Medical History:     Past Medical History:   Diagnosis Date   • Dermatitis, contact     last assessed. .... 9/26/13    resolved. ..12/1/14    • Diverticulitis of colon     last assessed. ..10/10/12   resolved. ...12/1/14   • Hematuria     last assessed. Saadia Caballero 1/20/15  resolved. .... 2/6/15   • Hypertension     unspecified type. ..... last assessed. Saadia Caballero 1/19/18   • Rotator cuff tendinitis     unspecified laterality    last assessed. ... 9/30/13   resolved. ....12/1/14     • Sebaceous cyst     last assessed. ....12/1/14   resolved. ... 2/6/15   • Swelling of wrist joint     right. ..... last assessed. ... 2/6/15    resolved. ..... 4/6/15       PAST SURGICAL HISTORY:     Past Surgical History:   Procedure Laterality Date   • COLONOSCOPY      complete       CURRENT MEDICATIONS:     Current Outpatient Medications   Medication Sig Dispense Refill   • amLODIPine (NORVASC) 10 mg tablet TAKE 1 TABLET BY MOUTH EVERY DAY 90 tablet 0   • atorvastatin (LIPITOR) 20 mg tablet TAKE 1 TABLET BY MOUTH EVERY DAY 90 tablet 3   • cholecalciferol (VITAMIN D3) 1,000 units tablet Take 1 tablet (1,000 Units total) by mouth daily Start after done with the high-dose. 90 tablet 3   • ergocalciferol (VITAMIN D2) 50,000 units Take 1 capsule (50,000 Units total) by mouth once a week 4 capsule 0   • fluticasone (FLONASE) 50 mcg/act nasal spray 2 sprays into each nostril daily as needed     • gabapentin (NEURONTIN) 300 mg capsule TAKE 1 CAPSULE BY MOUTH AT BEDTIME 90 capsule 2   • naproxen (NAPROSYN) 500 mg tablet TAKE 1 TABLET BY MOUTH 2 (TWO) TIMES A DAY AS NEEDED FOR MODERATE PAIN (TAKE WITH FOOD.) 60 tablet 0   • sildenafil (VIAGRA) 50 MG tablet      • Na Sulfate-K Sulfate-Mg Sulf (Suprep Bowel Prep Kit) 17.5-3.13-1.6 GM/177ML SOLN Follow office instructions 177 mL 0     No current facility-administered medications for this visit.        ALLERGIES:     Allergies   Allergen Reactions   • Celecoxib Syncope       SOCIAL HISTORY:     Social History     Socioeconomic History   • Marital status: /Civil Union     Spouse name: None   • Number of children: None   • Years of education: None   • Highest education level: None   Occupational History   • Occupation: disbatch manager   Tobacco Use   • Smoking status: Never   • Smokeless tobacco: Never   Vaping Use   • Vaping Use: Never used   Substance and Sexual Activity   • Alcohol use: Yes     Comment: social - rare   • Drug use: No   • Sexual activity: None   Other Topics Concern   • None   Social History Narrative   • None     Social Determinants of Health     Financial Resource Strain: Not on file   Food Insecurity: Not on file   Transportation Needs: Not on file   Physical Activity: Not on file   Stress: Not on file   Social Connections: Not on file   Intimate Partner Violence: Not on file   Housing Stability: Not on file       SOCIAL HISTORY:     Family History   Problem Relation Age of Onset   • Brain cancer Mother    • Colon cancer Mother    • Heart attack Father         acute /  at 46    • Colon cancer Brother    • Hypertension Brother        REVIEW OF SYSTEMS:     Review of Systems   Constitutional: Negative for chills and fever. HENT: Negative for ear pain and sore throat. Eyes: Negative for pain and visual disturbance. Respiratory: Negative for cough and shortness of breath. Cardiovascular: Negative for chest pain and palpitations. Gastrointestinal: Positive for abdominal pain. Negative for vomiting. Genitourinary: Positive for testicular pain. Negative for dysuria and hematuria. Musculoskeletal: Negative for arthralgias and back pain. Skin: Negative for color change and rash. Neurological: Negative for seizures and syncope. All other systems reviewed and are negative. PHYSICAL EXAM:     /82 (BP Location: Left arm, Patient Position: Sitting, Cuff Size: Adult)   Pulse 69   Ht 5' 10" (1.778 m)   Wt 96.2 kg (212 lb)   SpO2 99%   BMI 30.42 kg/m²     Physical Exam  Vitals reviewed. Constitutional:       General: He is not in acute distress. Appearance: He is well-developed. HENT:      Head: Normocephalic and atraumatic. Eyes:      Pupils: Pupils are equal, round, and reactive to light. Cardiovascular:      Rate and Rhythm: Normal rate. Pulmonary:      Effort: Pulmonary effort is normal. No respiratory distress. Breath sounds: Normal breath sounds. Abdominal:      General: There is no distension. Palpations: Abdomen is soft. Tenderness: There is no abdominal tenderness. Hernia: A hernia is present. Comments: Large left inguinal hernia tender to palpation   Genitourinary:     Penis: Normal.       Testes: Normal.      Prostate: Normal.   Musculoskeletal:         General: Normal range of motion. Cervical back: Normal range of motion and neck supple. Skin:     General: Skin is warm and dry. Neurological:      Mental Status: He is alert and oriented to person, place, and time. Psychiatric:         Behavior: Behavior normal.         LABS:     CBC:   Lab Results   Component Value Date    WBC 5.81 10/10/2022    HGB 13.9 10/10/2022    HCT 44.5 10/10/2022    MCV 89 10/10/2022     10/10/2022       BMP:   Lab Results   Component Value Date    GLUCOSE 90 10/06/2015    CALCIUM 9.5 10/10/2022     10/06/2015    K 3.8 10/10/2022    CO2 28 10/10/2022     10/10/2022    BUN 13 10/10/2022    CREATININE 1.02 10/10/2022         IMAGIN/21/23  SCROTAL ULTRASOUND     INDICATION:    N45.1: Epididymitis. Left scrotal pain x4 months; tender to touch     COMPARISON: None     TECHNIQUE:   Ultrasound the scrotal contents was performed with a high frequency linear transducer utilizing volumetric sweep imaging as well as standard still image techniques. Imaging performed in longitudinal and transverse orientation. Color and   spectral Doppler evaluation also performed bilaterally.     FINDINGS:     TESTES:  Testes are symmetric and normal in size.     RIGHT testis = 3.9 x 1.9 x 3.1 cm. Volume 11.9 mL  Normal contour with homogeneous smooth echotexture. No intratesticular mass lesion or calcifications.     LEFT testis = 4.0 x 1.9 x 2.7 cm. Volume 10.9 mL  Normal contour with homogeneous smooth echotexture.   No intratesticular mass lesion or calcifications.     Doppler flow within both testes is present and symmetric.     EPIDIDYMIDES:  Normal Size. Doppler ultrasound demonstrates normal blood flow. 6 mm epididymal cyst is noted on the right.     HYDROCELE:  No significant fluid present.     VARICOCELE: Borderline varicocele on the left. .     SCROTUM:  Scrotal thickness and appearance within normal limits. No evidence for extratesticular mass or hernia demonstrated.     IMPRESSION:     Unremarkable testes.     Small epididymal cyst on the right.     Borderline varicocele on the left. PROCEDURE:     Recent Results (from the past 2 hour(s))   POCT urine dip    Collection Time: 08/01/23 10:30 AM   Result Value Ref Range    LEUKOCYTE ESTERASE,UA -     NITRITE,UA -     SL AMB POCT UROBILINOGEN 0.2     POCT URINE PROTEIN -      PH,UA 6.0     BLOOD,UA -     SPECIFIC GRAVITY,UA 1.005     KETONES,UA -     BILIRUBIN,UA -     GLUCOSE, UA -      COLOR,UA yellow     CLARITY,UA clear    ]    ASSESSMENT:     58 y.o. male  with rising PSA    PLAN:     Patient's prostate exam is normal, urinalysis is normal, I am recommending repeat PSA in 2 weeks. If the number remains elevated or continues to escalate, will consider upfront multiparametric MRI testing. Otherwise if the PSA reduces, would consider follow-up in 6 months time. Patient has a large left inguinal hernia which is tender. He reports that it waxes and wanes in size but has been certainly creating discomfort. I am recommending a referral to general surgery for discussion of surgical repair.

## 2023-08-01 NOTE — Clinical Note
Tried to direct a referral to you. This gentleman with large tender LEFT inguinal hernia. Interested and motivated for repair.  Thanks

## 2023-08-22 ENCOUNTER — CONSULT (OUTPATIENT)
Dept: SURGERY | Facility: CLINIC | Age: 63
End: 2023-08-22
Payer: COMMERCIAL

## 2023-08-22 VITALS
BODY MASS INDEX: 29.92 KG/M2 | HEART RATE: 64 BPM | HEIGHT: 70 IN | RESPIRATION RATE: 16 BRPM | SYSTOLIC BLOOD PRESSURE: 122 MMHG | WEIGHT: 209 LBS | TEMPERATURE: 97.1 F | DIASTOLIC BLOOD PRESSURE: 77 MMHG

## 2023-08-22 DIAGNOSIS — K40.90 LEFT INGUINAL HERNIA: Primary | ICD-10-CM

## 2023-08-22 DIAGNOSIS — E66.9 OBESITY (BMI 30-39.9): ICD-10-CM

## 2023-08-22 PROCEDURE — 99203 OFFICE O/P NEW LOW 30 MIN: CPT | Performed by: SURGERY

## 2023-08-28 ENCOUNTER — APPOINTMENT (OUTPATIENT)
Dept: LAB | Facility: HOSPITAL | Age: 63
End: 2023-08-28
Payer: COMMERCIAL

## 2023-08-28 DIAGNOSIS — K40.90 LEFT INGUINAL HERNIA: ICD-10-CM

## 2023-08-28 DIAGNOSIS — R51.9 NONINTRACTABLE HEADACHE, UNSPECIFIED CHRONICITY PATTERN, UNSPECIFIED HEADACHE TYPE: ICD-10-CM

## 2023-08-28 DIAGNOSIS — R97.20 ELEVATED PSA: ICD-10-CM

## 2023-08-28 LAB
ANION GAP SERPL CALCULATED.3IONS-SCNC: 6 MMOL/L
BASOPHILS # BLD AUTO: 0.05 THOUSANDS/ÂΜL (ref 0–0.1)
BASOPHILS NFR BLD AUTO: 1 % (ref 0–1)
BUN SERPL-MCNC: 11 MG/DL (ref 5–25)
CALCIUM SERPL-MCNC: 9.5 MG/DL (ref 8.4–10.2)
CHLORIDE SERPL-SCNC: 105 MMOL/L (ref 96–108)
CO2 SERPL-SCNC: 29 MMOL/L (ref 21–32)
CREAT SERPL-MCNC: 0.95 MG/DL (ref 0.6–1.3)
EOSINOPHIL # BLD AUTO: 0.07 THOUSAND/ÂΜL (ref 0–0.61)
EOSINOPHIL NFR BLD AUTO: 1 % (ref 0–6)
ERYTHROCYTE [DISTWIDTH] IN BLOOD BY AUTOMATED COUNT: 15.5 % (ref 11.6–15.1)
GFR SERPL CREATININE-BSD FRML MDRD: 85 ML/MIN/1.73SQ M
GLUCOSE SERPL-MCNC: 97 MG/DL (ref 65–140)
HCT VFR BLD AUTO: 42.8 % (ref 36.5–49.3)
HGB BLD-MCNC: 13.6 G/DL (ref 12–17)
IMM GRANULOCYTES # BLD AUTO: 0.01 THOUSAND/UL (ref 0–0.2)
IMM GRANULOCYTES NFR BLD AUTO: 0 % (ref 0–2)
LYMPHOCYTES # BLD AUTO: 1.74 THOUSANDS/ÂΜL (ref 0.6–4.47)
LYMPHOCYTES NFR BLD AUTO: 35 % (ref 14–44)
MCH RBC QN AUTO: 27.9 PG (ref 26.8–34.3)
MCHC RBC AUTO-ENTMCNC: 31.8 G/DL (ref 31.4–37.4)
MCV RBC AUTO: 88 FL (ref 82–98)
MONOCYTES # BLD AUTO: 0.53 THOUSAND/ÂΜL (ref 0.17–1.22)
MONOCYTES NFR BLD AUTO: 11 % (ref 4–12)
NEUTROPHILS # BLD AUTO: 2.58 THOUSANDS/ÂΜL (ref 1.85–7.62)
NEUTS SEG NFR BLD AUTO: 52 % (ref 43–75)
NRBC BLD AUTO-RTO: 0 /100 WBCS
PLATELET # BLD AUTO: 231 THOUSANDS/UL (ref 149–390)
PMV BLD AUTO: 9.6 FL (ref 8.9–12.7)
POTASSIUM SERPL-SCNC: 4.1 MMOL/L (ref 3.5–5.3)
RBC # BLD AUTO: 4.88 MILLION/UL (ref 3.88–5.62)
SODIUM SERPL-SCNC: 140 MMOL/L (ref 135–147)
WBC # BLD AUTO: 4.98 THOUSAND/UL (ref 4.31–10.16)

## 2023-08-28 PROCEDURE — 84153 ASSAY OF PSA TOTAL: CPT

## 2023-08-28 PROCEDURE — 36415 COLL VENOUS BLD VENIPUNCTURE: CPT

## 2023-08-28 PROCEDURE — 84154 ASSAY OF PSA FREE: CPT

## 2023-08-28 PROCEDURE — 85025 COMPLETE CBC W/AUTO DIFF WBC: CPT

## 2023-08-28 PROCEDURE — 80048 BASIC METABOLIC PNL TOTAL CA: CPT

## 2023-08-28 RX ORDER — NAPROXEN 500 MG/1
TABLET ORAL
Qty: 60 TABLET | Refills: 0 | Status: SHIPPED | OUTPATIENT
Start: 2023-08-28

## 2023-08-29 ENCOUNTER — TELEPHONE (OUTPATIENT)
Dept: UROLOGY | Facility: CLINIC | Age: 63
End: 2023-08-29

## 2023-08-29 PROBLEM — K40.90 LEFT INGUINAL HERNIA: Status: ACTIVE | Noted: 2023-08-29

## 2023-08-29 LAB
ATRIAL RATE: 63 BPM
P AXIS: 17 DEGREES
PR INTERVAL: 146 MS
PSA FREE MFR SERPL: 9.8 %
PSA FREE SERPL-MCNC: 0.44 NG/ML
PSA SERPL-MCNC: 4.5 NG/ML (ref 0–4)
QRS AXIS: 1 DEGREES
QRSD INTERVAL: 88 MS
QT INTERVAL: 394 MS
QTC INTERVAL: 403 MS
T WAVE AXIS: -5 DEGREES
VENTRICULAR RATE: 63 BPM

## 2023-08-29 PROCEDURE — 93010 ELECTROCARDIOGRAM REPORT: CPT | Performed by: STUDENT IN AN ORGANIZED HEALTH CARE EDUCATION/TRAINING PROGRAM

## 2023-08-29 RX ORDER — SODIUM CHLORIDE, SODIUM LACTATE, POTASSIUM CHLORIDE, CALCIUM CHLORIDE 600; 310; 30; 20 MG/100ML; MG/100ML; MG/100ML; MG/100ML
125 INJECTION, SOLUTION INTRAVENOUS CONTINUOUS
OUTPATIENT
Start: 2023-10-05

## 2023-08-29 NOTE — ASSESSMENT & PLAN NOTE
He has a left inguinal hernia on examination.   Plan on a robotic repair of his left inguinal hernia at [facility].  The risk benefits alternatives explained the patient is agreeable to proceed

## 2023-08-29 NOTE — TELEPHONE ENCOUNTER
Pt returned call informed him per Dr. Jeni Hamm. PSA continues to elevate and he recommends getting a MRI prostate.     Gave pt number to call central scheduling to schedule MRI    Pt verbalized understanding

## 2023-08-29 NOTE — TELEPHONE ENCOUNTER
----- Message from Ximena Zee MD sent at 8/29/2023  1:03 PM EDT -----  Unfortunately, the patient's PSA continues to elevate. Given the continued rise and free PSA percentage, I am recommending additional testing in the form of a multiparametric MRI which we discussed at his visit. Can we please alert the patient and assist with scheduling.   I will contact him directly once results of the MRI are completed

## 2023-08-29 NOTE — PROGRESS NOTES
Assessment/Plan:    Left inguinal hernia  He has a left inguinal hernia on examination. Plan on a robotic repair of his left inguinal hernia at [facility].  The risk benefits alternatives explained the patient is agreeable to proceed       Diagnoses and all orders for this visit:    Left inguinal hernia  -     Ambulatory Referral to General Surgery  -     Case request operating room: REPAIR HERNIA INGUINAL LAPAROSCOPIC W/ ROBOTICS; Standing  -     CBC and differential; Future  -     Basic metabolic panel; Future  -     EKG 12 lead; Future  -     Case request operating room: REPAIR HERNIA INGUINAL LAPAROSCOPIC W/ ROBOTICS    Obesity (BMI 30-39. 9)    Other orders  -     Diet NPO; Sips with meds; Standing  -     Apply Sequential Compression Device; Standing  -     Place sequential compression device; Standing  -     Reason for no Mechanical VTE Prophylaxis; Standing  -     lactated ringers infusion  -     ceFAZolin (ANCEF) 2,000 mg in dextrose 5 % 100 mL IVPB          Subjective:      Patient ID: Justin Sheldon is a 58 y.o. male. Mr. Shad Calhoun is a 27-year-old gentleman here for evaluation of left inguinal hernia. He states he started having some discomfort in this area a while ago. Is referred for an ultrasound. Since then things have increased in size and he was seen by urology. They confirmed a left inguinal hernia and referred him for evaluation. He has some pain in the area. Denies nausea vomiting fevers or chills. The following portions of the patient's history were reviewed and updated as appropriate: allergies, current medications, past family history, past medical history, past social history, past surgical history and problem list.    Review of Systems   Constitutional: Negative for chills and fever. HENT: Negative for ear pain and sore throat. Eyes: Negative for pain and visual disturbance. Respiratory: Negative for cough and shortness of breath.     Cardiovascular: Negative for chest pain and palpitations. Gastrointestinal: Negative for abdominal pain and vomiting. Genitourinary: Negative for dysuria and hematuria. Musculoskeletal: Negative for arthralgias and back pain. Skin: Negative for color change and rash. Neurological: Negative for seizures and syncope. Psychiatric/Behavioral: Negative for agitation and behavioral problems. All other systems reviewed and are negative. Objective:      /77   Pulse 64   Temp (!) 97.1 °F (36.2 °C)   Resp 16   Ht 5' 10" (1.778 m)   Wt 94.8 kg (209 lb)   BMI 29.99 kg/m²          Physical Exam  Vitals and nursing note reviewed. Constitutional:       General: He is not in acute distress. Appearance: He is well-developed. He is not diaphoretic. HENT:      Head: Normocephalic and atraumatic. Eyes:      Pupils: Pupils are equal, round, and reactive to light. Cardiovascular:      Rate and Rhythm: Normal rate and regular rhythm. Pulmonary:      Effort: Pulmonary effort is normal. No respiratory distress. Breath sounds: Normal breath sounds. No wheezing. Abdominal:      General: Bowel sounds are normal.      Palpations: Abdomen is soft. Comments: Large reducible left inguinal hernia   Musculoskeletal:         General: Normal range of motion. Cervical back: Normal range of motion and neck supple. Skin:     General: Skin is warm and dry. Neurological:      Mental Status: He is alert and oriented to person, place, and time.    Psychiatric:         Behavior: Behavior normal.

## 2023-09-07 DIAGNOSIS — E55.9 VITAMIN D DEFICIENCY: ICD-10-CM

## 2023-09-07 RX ORDER — CHOLECALCIFEROL (VITAMIN D3) 25 MCG
TABLET ORAL
Qty: 90 TABLET | Refills: 3 | Status: SHIPPED | OUTPATIENT
Start: 2023-09-07

## 2023-09-21 ENCOUNTER — OFFICE VISIT (OUTPATIENT)
Dept: FAMILY MEDICINE CLINIC | Facility: CLINIC | Age: 63
End: 2023-09-21
Payer: COMMERCIAL

## 2023-09-21 VITALS
SYSTOLIC BLOOD PRESSURE: 134 MMHG | WEIGHT: 211.6 LBS | DIASTOLIC BLOOD PRESSURE: 78 MMHG | BODY MASS INDEX: 30.36 KG/M2 | OXYGEN SATURATION: 97 % | HEART RATE: 66 BPM

## 2023-09-21 DIAGNOSIS — Z00.00 ANNUAL PHYSICAL EXAM: Primary | ICD-10-CM

## 2023-09-21 DIAGNOSIS — E55.9 VITAMIN D DEFICIENCY: ICD-10-CM

## 2023-09-21 DIAGNOSIS — Z13.29 SCREENING FOR HYPOTHYROIDISM: ICD-10-CM

## 2023-09-21 DIAGNOSIS — Z13.89 SCREENING FOR BLOOD OR PROTEIN IN URINE: ICD-10-CM

## 2023-09-21 DIAGNOSIS — F41.8 SITUATIONAL ANXIETY: ICD-10-CM

## 2023-09-21 DIAGNOSIS — E66.9 OBESITY (BMI 30-39.9): ICD-10-CM

## 2023-09-21 DIAGNOSIS — I10 ESSENTIAL HYPERTENSION: ICD-10-CM

## 2023-09-21 DIAGNOSIS — R73.03 PREDIABETES: ICD-10-CM

## 2023-09-21 DIAGNOSIS — Z13.0 SCREENING FOR DEFICIENCY ANEMIA: ICD-10-CM

## 2023-09-21 DIAGNOSIS — R97.20 ELEVATED PSA: ICD-10-CM

## 2023-09-21 DIAGNOSIS — E78.5 HYPERLIPIDEMIA, UNSPECIFIED HYPERLIPIDEMIA TYPE: ICD-10-CM

## 2023-09-21 PROBLEM — R10.32 INGUINAL PAIN, LEFT: Status: RESOLVED | Noted: 2022-09-27 | Resolved: 2023-09-21

## 2023-09-21 PROBLEM — N45.1 EPIDIDYMITIS: Status: RESOLVED | Noted: 2023-06-15 | Resolved: 2023-09-21

## 2023-09-21 PROBLEM — U07.1 COVID-19 VIRUS INFECTION: Status: RESOLVED | Noted: 2021-11-16 | Resolved: 2023-09-21

## 2023-09-21 PROCEDURE — 99396 PREV VISIT EST AGE 40-64: CPT | Performed by: INTERNAL MEDICINE

## 2023-09-21 RX ORDER — ALPRAZOLAM 0.5 MG/1
TABLET ORAL
Qty: 3 TABLET | Refills: 0 | Status: SHIPPED | OUTPATIENT
Start: 2023-09-21

## 2023-09-21 NOTE — PROGRESS NOTES
Assessment/Plan:    Hypertension  Well-controlled. Obesity (BMI 30-39. 9)  Slowly losing weight. Intentionally. Prediabetes  Well-controlled. Recheck with the next blood work. Elevated PSA  He will follow-up with urology, scheduled for MRI of his prostate tomorrow. Diagnoses and all orders for this visit:    Annual physical exam    Situational anxiety  -     ALPRAZolam (XANAX) 0.5 mg tablet; Please take 1 tablet night before your MRI, second tablet you can take 30 minutes before your MRI. Please do not drive after taking medications. Essential hypertension  -     Comprehensive metabolic panel; Future    Screening for hypothyroidism  -     TSH, 3rd generation with Free T4 reflex; Future    Screening for deficiency anemia  -     CBC and differential; Future    Prediabetes  -     HEMOGLOBIN A1C W/ EAG ESTIMATION; Future    Screening for blood or protein in urine  -     UA (URINE) with reflex to Scope    Vitamin D deficiency  -     Vitamin D 25 hydroxy; Future    Hyperlipidemia, unspecified hyperlipidemia type  -     Lipid panel; Future    Obesity (BMI 30-39. 9)    Elevated PSA          Subjective:      Patient ID: Josesito Esteban is a 58 y.o. male. Patient came today for annual checkup. He is vital signs are very acceptable. He is up-to-date on his colonoscopy  Up-to-date on PSA, he follows up with urology. Agreed for flu shot, up-to-date on his vaccinations. He does not smoke, he is physically active. The following portions of the patient's history were reviewed and updated as appropriate: allergies, current medications, past family history, past medical history, past social history, past surgical history, and problem list.    Review of Systems   Constitutional: Negative for chills, fatigue and fever. Respiratory: Negative for cough, chest tightness and shortness of breath. Cardiovascular: Negative for chest pain, palpitations and leg swelling.    Gastrointestinal: Negative for abdominal distention, abdominal pain, blood in stool, constipation, diarrhea and nausea. Genitourinary: Negative for difficulty urinating and dysuria. Musculoskeletal: Negative for arthralgias, back pain, gait problem, joint swelling, myalgias and neck pain. Skin: Negative for rash. Neurological: Negative for dizziness, weakness, numbness and headaches. Psychiatric/Behavioral: Negative for agitation. Objective:      /78 (BP Location: Left arm, Patient Position: Sitting, Cuff Size: Standard)   Pulse 66   Wt 96 kg (211 lb 9.6 oz)   SpO2 97%   BMI 30.36 kg/m²     Allergies   Allergen Reactions   • Celecoxib Syncope          Current Outpatient Medications:   •  ALPRAZolam (XANAX) 0.5 mg tablet, Please take 1 tablet night before your MRI, second tablet you can take 30 minutes before your MRI. Please do not drive after taking medications. , Disp: 3 tablet, Rfl: 0  •  amLODIPine (NORVASC) 10 mg tablet, TAKE 1 TABLET BY MOUTH EVERY DAY, Disp: 90 tablet, Rfl: 0  •  atorvastatin (LIPITOR) 20 mg tablet, TAKE 1 TABLET BY MOUTH EVERY DAY, Disp: 90 tablet, Rfl: 3  •  Cholecalciferol (Vitamin D3) 25 MCG TABS, TAKE 1 TABLET (1,000 UNITS TOTAL) BY MOUTH DAILY START AFTER DONE WITH THE HIGH-DOSE., Disp: 90 tablet, Rfl: 3  •  fluticasone (FLONASE) 50 mcg/act nasal spray, 2 sprays into each nostril daily as needed, Disp: , Rfl:   •  gabapentin (NEURONTIN) 300 mg capsule, TAKE 1 CAPSULE BY MOUTH AT BEDTIME, Disp: 90 capsule, Rfl: 2  •  naproxen (NAPROSYN) 500 mg tablet, TAKE 1 TABLET BY MOUTH 2 (TWO) TIMES A DAY AS NEEDED FOR MODERATE PAIN (TAKE WITH FOOD.), Disp: 60 tablet, Rfl: 0  •  sildenafil (VIAGRA) 50 MG tablet, , Disp: , Rfl:   •  Na Sulfate-K Sulfate-Mg Sulf (Suprep Bowel Prep Kit) 17.5-3.13-1.6 GM/177ML SOLN, Follow office instructions, Disp: 177 mL, Rfl: 0     There are no Patient Instructions on file for this visit. Physical Exam  Constitutional:       Appearance: He is not ill-appearing. HENT:      Head: Normocephalic and atraumatic. Nose: No congestion or rhinorrhea. Eyes:      Pupils: Pupils are equal, round, and reactive to light. Cardiovascular:      Rate and Rhythm: Normal rate and regular rhythm. Pulses: Normal pulses. Heart sounds: Normal heart sounds. No murmur heard. No friction rub. No gallop. Pulmonary:      Effort: Pulmonary effort is normal. No respiratory distress. Breath sounds: Normal breath sounds. No wheezing or rales. Chest:      Chest wall: No tenderness. Abdominal:      General: Bowel sounds are normal. There is no distension. Palpations: Abdomen is soft. There is no mass. Tenderness: There is no abdominal tenderness. There is no rebound. Hernia: No hernia is present. Musculoskeletal:         General: No swelling, tenderness or deformity. Cervical back: No rigidity. No muscular tenderness. Skin:     Coloration: Skin is not pale. Findings: No erythema, lesion or rash. Neurological:      Mental Status: He is alert and oriented to person, place, and time. Sensory: No sensory deficit. Motor: No weakness.    Psychiatric:         Mood and Affect: Mood normal.         Behavior: Behavior normal.

## 2023-09-22 ENCOUNTER — HOSPITAL ENCOUNTER (OUTPATIENT)
Facility: MEDICAL CENTER | Age: 63
Discharge: HOME | End: 2023-09-22
Payer: COMMERCIAL

## 2023-09-22 DIAGNOSIS — R97.20 ELEVATED PSA: ICD-10-CM

## 2023-09-22 PROCEDURE — A9585 GADOBUTROL INJECTION: HCPCS | Performed by: UROLOGY

## 2023-09-22 PROCEDURE — 76377 3D RENDER W/INTRP POSTPROCES: CPT

## 2023-09-22 PROCEDURE — G1004 CDSM NDSC: HCPCS

## 2023-09-22 PROCEDURE — 72197 MRI PELVIS W/O & W/DYE: CPT

## 2023-09-22 RX ORDER — GADOBUTROL 604.72 MG/ML
9 INJECTION INTRAVENOUS
Status: COMPLETED | OUTPATIENT
Start: 2023-09-22 | End: 2023-09-22

## 2023-09-22 RX ADMIN — GADOBUTROL 9 ML: 604.72 INJECTION INTRAVENOUS at 11:34

## 2023-09-24 DIAGNOSIS — R51.9 NONINTRACTABLE HEADACHE, UNSPECIFIED CHRONICITY PATTERN, UNSPECIFIED HEADACHE TYPE: ICD-10-CM

## 2023-09-24 RX ORDER — NAPROXEN 500 MG/1
TABLET ORAL
Qty: 60 TABLET | Refills: 0 | Status: SHIPPED | OUTPATIENT
Start: 2023-09-24

## 2023-09-25 ENCOUNTER — TELEPHONE (OUTPATIENT)
Dept: UROLOGY | Facility: CLINIC | Age: 63
End: 2023-09-25

## 2023-09-25 DIAGNOSIS — R97.20 ELEVATED PSA: Primary | ICD-10-CM

## 2023-09-25 NOTE — TELEPHONE ENCOUNTER
----- Message from Chandni Redmond MD sent at 9/25/2023  7:30 AM EDT -----  The patient's prostate is small and his MRI is lowest PI-RADS score. I would not recommend biopsy at this time but instead recommend a continued follow-up of PSA in 6 to 8 weeks. If the number continues to be concerning, the patient may need a transperineal biopsy but if the number begins to reduce, I think it is reasonable for us to continue to survey. This MRI is very reassuring. Please let the patient know and arrange PSA testing in 6 to 8 weeks    Called and explained above to patient. He voiced understanding and was thankful for the call.

## 2023-09-28 ENCOUNTER — ANESTHESIA EVENT (OUTPATIENT)
Dept: PERIOP | Facility: HOSPITAL | Age: 63
End: 2023-09-28
Payer: COMMERCIAL

## 2023-09-28 NOTE — PRE-PROCEDURE INSTRUCTIONS
Pre-Surgery Instructions:   Medication Instructions   • amLODIPine (NORVASC) 10 mg tablet Take day of surgery. • atorvastatin (LIPITOR) 20 mg tablet Take day of surgery. • Cholecalciferol (Vitamin D3) 25 MCG TABS Stop taking 7 days prior to surgery. • fluticasone (FLONASE) 50 mcg/act nasal spray Uses PRN- OK to take day of surgery   • gabapentin (NEURONTIN) 300 mg capsule Uses PRN- OK to take day of surgery   • naproxen (NAPROSYN) 500 mg tablet Stop taking 3 days prior to surgery. • sildenafil (VIAGRA) 50 MG tablet Stop taking 1 day prior to surgery. Medication instructions for day surgery reviewed. Please use only a sip of water to take your instructed medications. Avoid all over the counter vitamins, supplements and NSAIDS for one week prior to surgery per anesthesia guidelines. Tylenol is ok to take as needed. You will receive a call one business day prior to surgery with an arrival time and hospital directions. If your surgery is scheduled on a Monday, the hospital will be calling you on the Friday prior to your surgery. If you have not heard from anyone by 8pm, please call the hospital supervisor through the hospital  at [phone]. Kenny Diez [phone]). Do not eat or drink anything after midnight the night before your surgery, including candy, mints, lifesavers, or chewing gum. Do not drink alcohol 24hrs before your surgery. Try not to smoke at least 24hrs before your surgery. Follow the pre surgery showering instructions as listed in the [facility] Surgical Experience Booklet” or otherwise provided by your surgeon's office. Do not shave the surgical area 24 hours before surgery. Do not apply any lotions, creams, including makeup, cologne, deodorant, or perfumes after showering on the day of your surgery. No contact lenses, eye make-up, or artificial eyelashes. Remove nail polish, including gel polish, and any artificial, gel, or acrylic nails if possible.  Remove all jewelry including rings and body piercing jewelry. Wear causal clothing that is easy to take on and off. Consider your type of surgery. Keep any valuables, jewelry, piercings at home. Please bring any specially ordered equipment (sling, braces) if indicated. Arrange for a responsible person to drive you to and from the hospital on the day of your surgery. Visitor Guidelines discussed. Call the surgeon's office with any new illnesses, exposures, or additional questions prior to surgery. Please reference your [facility] Surgical Experience Booklet” for additional information to prepare for your upcoming surgery.

## 2023-10-05 ENCOUNTER — HOSPITAL ENCOUNTER (OUTPATIENT)
Facility: HOSPITAL | Age: 63
Setting detail: OUTPATIENT SURGERY
Discharge: HOME | End: 2023-10-05
Attending: SURGERY | Admitting: SURGERY
Payer: COMMERCIAL

## 2023-10-05 ENCOUNTER — ANESTHESIA (OUTPATIENT)
Dept: PERIOP | Facility: HOSPITAL | Age: 63
End: 2023-10-05
Payer: COMMERCIAL

## 2023-10-05 VITALS
OXYGEN SATURATION: 93 % | DIASTOLIC BLOOD PRESSURE: 63 MMHG | TEMPERATURE: 97.9 F | BODY MASS INDEX: 30.02 KG/M2 | SYSTOLIC BLOOD PRESSURE: 119 MMHG | RESPIRATION RATE: 16 BRPM | HEART RATE: 62 BPM | WEIGHT: 209.22 LBS

## 2023-10-05 DIAGNOSIS — K40.90 LEFT INGUINAL HERNIA: Primary | ICD-10-CM

## 2023-10-05 PROCEDURE — 49650 LAP ING HERNIA REPAIR INIT: CPT | Performed by: PHYSICIAN ASSISTANT

## 2023-10-05 PROCEDURE — 49650 LAP ING HERNIA REPAIR INIT: CPT | Performed by: SURGERY

## 2023-10-05 PROCEDURE — NC001 PR NO CHARGE: Performed by: SURGERY

## 2023-10-05 PROCEDURE — C1781 MESH (IMPLANTABLE): HCPCS | Performed by: SURGERY

## 2023-10-05 PROCEDURE — S2900 ROBOTIC SURGICAL SYSTEM: HCPCS | Performed by: SURGERY

## 2023-10-05 DEVICE — 3DMAX™ MID ANATOMICAL MESH, XL, LEFT, 5” X 7”, 12 X 17 CM
Type: IMPLANTABLE DEVICE | Site: INGUINAL | Status: FUNCTIONAL
Brand: 3DMAX™ MID ANATOMICAL MESH

## 2023-10-05 RX ORDER — MIDAZOLAM HYDROCHLORIDE 2 MG/2ML
INJECTION, SOLUTION INTRAMUSCULAR; INTRAVENOUS AS NEEDED
Status: DISCONTINUED | OUTPATIENT
Start: 2023-10-05 | End: 2023-10-05

## 2023-10-05 RX ORDER — ONDANSETRON 2 MG/ML
INJECTION INTRAMUSCULAR; INTRAVENOUS AS NEEDED
Status: DISCONTINUED | OUTPATIENT
Start: 2023-10-05 | End: 2023-10-05

## 2023-10-05 RX ORDER — FENTANYL CITRATE 50 UG/ML
INJECTION, SOLUTION INTRAMUSCULAR; INTRAVENOUS AS NEEDED
Status: DISCONTINUED | OUTPATIENT
Start: 2023-10-05 | End: 2023-10-05

## 2023-10-05 RX ORDER — OXYCODONE HYDROCHLORIDE AND ACETAMINOPHEN 5; 325 MG/1; MG/1
2 TABLET ORAL EVERY 6 HOURS PRN
Status: DISCONTINUED | OUTPATIENT
Start: 2023-10-05 | End: 2023-10-05 | Stop reason: HOSPADM

## 2023-10-05 RX ORDER — ROCURONIUM BROMIDE 10 MG/ML
INJECTION, SOLUTION INTRAVENOUS AS NEEDED
Status: DISCONTINUED | OUTPATIENT
Start: 2023-10-05 | End: 2023-10-05

## 2023-10-05 RX ORDER — FENTANYL CITRATE/PF 50 MCG/ML
25 SYRINGE (ML) INJECTION
Status: DISCONTINUED | OUTPATIENT
Start: 2023-10-05 | End: 2023-10-05 | Stop reason: HOSPADM

## 2023-10-05 RX ORDER — HYDROMORPHONE HCL/PF 1 MG/ML
0.5 SYRINGE (ML) INJECTION
Status: DISCONTINUED | OUTPATIENT
Start: 2023-10-05 | End: 2023-10-05 | Stop reason: HOSPADM

## 2023-10-05 RX ORDER — SODIUM CHLORIDE, SODIUM LACTATE, POTASSIUM CHLORIDE, CALCIUM CHLORIDE 600; 310; 30; 20 MG/100ML; MG/100ML; MG/100ML; MG/100ML
125 INJECTION, SOLUTION INTRAVENOUS CONTINUOUS
Status: DISCONTINUED | OUTPATIENT
Start: 2023-10-05 | End: 2023-10-05 | Stop reason: HOSPADM

## 2023-10-05 RX ORDER — SODIUM CHLORIDE 9 MG/ML
125 INJECTION, SOLUTION INTRAVENOUS CONTINUOUS
Status: DISCONTINUED | OUTPATIENT
Start: 2023-10-05 | End: 2023-10-05 | Stop reason: HOSPADM

## 2023-10-05 RX ORDER — PROPOFOL 10 MG/ML
INJECTION, EMULSION INTRAVENOUS AS NEEDED
Status: DISCONTINUED | OUTPATIENT
Start: 2023-10-05 | End: 2023-10-05

## 2023-10-05 RX ORDER — DEXAMETHASONE SODIUM PHOSPHATE 10 MG/ML
INJECTION, SOLUTION INTRAMUSCULAR; INTRAVENOUS AS NEEDED
Status: DISCONTINUED | OUTPATIENT
Start: 2023-10-05 | End: 2023-10-05

## 2023-10-05 RX ORDER — LIDOCAINE HYDROCHLORIDE 20 MG/ML
INJECTION, SOLUTION EPIDURAL; INFILTRATION; INTRACAUDAL; PERINEURAL AS NEEDED
Status: DISCONTINUED | OUTPATIENT
Start: 2023-10-05 | End: 2023-10-05

## 2023-10-05 RX ORDER — CEFAZOLIN SODIUM 2 G/50ML
2000 SOLUTION INTRAVENOUS ONCE
Status: COMPLETED | OUTPATIENT
Start: 2023-10-05 | End: 2023-10-05

## 2023-10-05 RX ORDER — BUPIVACAINE HYDROCHLORIDE 2.5 MG/ML
INJECTION, SOLUTION EPIDURAL; INFILTRATION; INTRACAUDAL AS NEEDED
Status: DISCONTINUED | OUTPATIENT
Start: 2023-10-05 | End: 2023-10-05 | Stop reason: HOSPADM

## 2023-10-05 RX ORDER — OXYCODONE HYDROCHLORIDE AND ACETAMINOPHEN 5; 325 MG/1; MG/1
1 TABLET ORAL EVERY 4 HOURS PRN
Status: DISCONTINUED | OUTPATIENT
Start: 2023-10-05 | End: 2023-10-05 | Stop reason: HOSPADM

## 2023-10-05 RX ORDER — ONDANSETRON 2 MG/ML
4 INJECTION INTRAMUSCULAR; INTRAVENOUS ONCE AS NEEDED
Status: DISCONTINUED | OUTPATIENT
Start: 2023-10-05 | End: 2023-10-05 | Stop reason: HOSPADM

## 2023-10-05 RX ORDER — ACETAMINOPHEN 10 MG/ML
1000 INJECTION, SOLUTION INTRAVENOUS ONCE
Status: COMPLETED | OUTPATIENT
Start: 2023-10-05 | End: 2023-10-05

## 2023-10-05 RX ORDER — OXYCODONE HYDROCHLORIDE 5 MG/1
5 TABLET ORAL EVERY 4 HOURS PRN
Qty: 12 TABLET | Refills: 0 | Status: SHIPPED | OUTPATIENT
Start: 2023-10-05 | End: 2023-10-15

## 2023-10-05 RX ORDER — MAGNESIUM HYDROXIDE 1200 MG/15ML
LIQUID ORAL AS NEEDED
Status: DISCONTINUED | OUTPATIENT
Start: 2023-10-05 | End: 2023-10-05 | Stop reason: HOSPADM

## 2023-10-05 RX ORDER — EPHEDRINE SULFATE 50 MG/ML
INJECTION INTRAVENOUS AS NEEDED
Status: DISCONTINUED | OUTPATIENT
Start: 2023-10-05 | End: 2023-10-05

## 2023-10-05 RX ORDER — GLYCOPYRROLATE 0.2 MG/ML
INJECTION INTRAMUSCULAR; INTRAVENOUS AS NEEDED
Status: DISCONTINUED | OUTPATIENT
Start: 2023-10-05 | End: 2023-10-05

## 2023-10-05 RX ADMIN — SODIUM CHLORIDE: 0.9 INJECTION, SOLUTION INTRAVENOUS at 09:44

## 2023-10-05 RX ADMIN — FENTANYL CITRATE 25 MCG: 0.05 INJECTION, SOLUTION INTRAMUSCULAR; INTRAVENOUS at 11:45

## 2023-10-05 RX ADMIN — GLYCOPYRROLATE 0.2 MG: 0.2 INJECTION INTRAMUSCULAR; INTRAVENOUS at 09:56

## 2023-10-05 RX ADMIN — ROCURONIUM BROMIDE 10 MG: 10 INJECTION, SOLUTION INTRAVENOUS at 10:04

## 2023-10-05 RX ADMIN — CEFAZOLIN SODIUM 2000 MG: 2 SOLUTION INTRAVENOUS at 08:38

## 2023-10-05 RX ADMIN — ACETAMINOPHEN 1000 MG: 10 INJECTION INTRAVENOUS at 08:52

## 2023-10-05 RX ADMIN — SODIUM CHLORIDE: 0.9 INJECTION, SOLUTION INTRAVENOUS at 11:19

## 2023-10-05 RX ADMIN — ROCURONIUM BROMIDE 10 MG: 10 INJECTION, SOLUTION INTRAVENOUS at 09:53

## 2023-10-05 RX ADMIN — ONDANSETRON 4 MG: 2 INJECTION INTRAMUSCULAR; INTRAVENOUS at 11:05

## 2023-10-05 RX ADMIN — SUGAMMADEX 200 MG: 100 INJECTION, SOLUTION INTRAVENOUS at 11:09

## 2023-10-05 RX ADMIN — EPHEDRINE SULFATE 10 MG: 50 INJECTION, SOLUTION INTRAVENOUS at 09:57

## 2023-10-05 RX ADMIN — MIDAZOLAM 2 MG: 1 INJECTION INTRAMUSCULAR; INTRAVENOUS at 08:38

## 2023-10-05 RX ADMIN — PROPOFOL 200 MG: 10 INJECTION, EMULSION INTRAVENOUS at 08:45

## 2023-10-05 RX ADMIN — FENTANYL CITRATE 25 MCG: 0.05 INJECTION, SOLUTION INTRAMUSCULAR; INTRAVENOUS at 11:31

## 2023-10-05 RX ADMIN — GLYCOPYRROLATE 0.2 MG: 0.2 INJECTION INTRAMUSCULAR; INTRAVENOUS at 10:01

## 2023-10-05 RX ADMIN — HYDROMORPHONE HYDROCHLORIDE 0.5 MG: 1 INJECTION, SOLUTION INTRAMUSCULAR; INTRAVENOUS; SUBCUTANEOUS at 12:44

## 2023-10-05 RX ADMIN — FENTANYL CITRATE 25 MCG: 0.05 INJECTION, SOLUTION INTRAMUSCULAR; INTRAVENOUS at 11:38

## 2023-10-05 RX ADMIN — FENTANYL CITRATE 50 MCG: 50 INJECTION, SOLUTION INTRAMUSCULAR; INTRAVENOUS at 08:45

## 2023-10-05 RX ADMIN — SODIUM CHLORIDE 125 ML/HR: 0.9 INJECTION, SOLUTION INTRAVENOUS at 06:32

## 2023-10-05 RX ADMIN — ROCURONIUM BROMIDE 10 MG: 10 INJECTION, SOLUTION INTRAVENOUS at 10:44

## 2023-10-05 RX ADMIN — DEXAMETHASONE SODIUM PHOSPHATE 10 MG: 10 INJECTION INTRAMUSCULAR; INTRAVENOUS at 08:51

## 2023-10-05 RX ADMIN — ROCURONIUM BROMIDE 50 MG: 10 INJECTION, SOLUTION INTRAVENOUS at 08:45

## 2023-10-05 RX ADMIN — LIDOCAINE HYDROCHLORIDE 100 MG: 20 INJECTION, SOLUTION EPIDURAL; INFILTRATION; INTRACAUDAL; PERINEURAL at 08:45

## 2023-10-05 NOTE — OP NOTE
OPERATIVE REPORT  PATIENT NAME: Sonya Lundberg    :  1960  MRN: [id number]  Pt Location: [facility] OR ROOM 08    SURGERY DATE: 10/5/2023    Surgeon(s) and Role:     * Sy Hernandez MD - Primary     * Rei Cedillo PA-C - Assisting     * Noelle Vera MD - Observing    Preop Diagnosis:  Left inguinal hernia [K40.90]    Post-Op Diagnosis Codes:     * Left inguinal hernia [K40.90]    Procedure(s):  Left - REPAIR HERNIA INGUINAL  W/ ROBOT WITH MESH    Specimen(s):  * No specimens in log *    Estimated Blood Loss:   Minimal    Drains:  Urethral Catheter Latex 16 Fr. (Active)   Number of days: 0       Anesthesia Type:   General    Operative Indications:  Left inguinal hernia [K40.90]      Operative Findings:  Very large indirect left inguinal hernia sliding-type with sigmoid colon. Complications:   None    Procedure and Technique:  The patient was seen again in the Holding Room. The risks, benefits, complications, treatment options, and expected outcomes were discussed with the patient. The possibilities of reaction to medication, pulmonary aspiration, perforation of viscus, bleeding, postoperative short or long term nerve entrapment causing pain,recurrent infection, the need for additional procedures, and development of a complication requiring transfusion or further operation were discussed with the patient and/or family. There was concurrence with the proposed plan, and informed consent was obtained. The site of surgery was properly noted/marked. The patient was taken to the Operating Room, identified as Sonya Lundberg and the procedure verified as hernia repair. A Time Out was held and the above information confirmed. The patient was prepped and draped in a sterile fashion. A timeout was again performed. Local anesthesia was used in the incision. An periumbilical incision was made. Dissection carried out to the fascia which was grasped with Kocher's and elevated.  The fascia was incised an 8 mm trocar was placed in under direct visualization. The abdomen was inflated and the camera was placed in. . Two8 mm trocars were placed lateral to rectus muscle approximately at the level of the umbilicus. At this point the patient was placed into Trendelenburg position and the robot was docked and the instruments were placed in. The patient was noted to have left inguinal hernia . The peritoneum was incised from the medial umbilical fold out laterally past the internal ring on the left. Next the direct space was mobilized by exposing Doroteo's ligament all the way along its length to the pubic tubercle. There was indirect hernia sac this was carefully mobilized off the cord structures with care to avoid injury to the gonadal vessels or spermatic cord. The remainder of the inferior flap was created. At this point  Bard 3D max mesh was selected and placed into the preperitoneal space. The mesh was deployed and placed in the appropriate position. The edge of the peritoneum was well below the edge of the mesh. The mesh secured with a 2-0 Vicryl suture at Doroteo's. The peritoneum was then sutured closed with the V lock suture. Now the repair was complete the robot was undocked. The umbilical trocor site was closed with an  0-vicryl using a suture Passer  The wound was closed in multiple layers using 3-0 Vicryl sutures and the skin closed using a 4-0 Monocryl subcuticular stitch. The wound was dressed. The patient was anatomically correct at the end of the procedure. The patient tolerated the procedure in good condition. Instrument, sponge, and needle counts were correct prior to closure and at the conclusion of the case. The PA was essential for assistance with abdominal access and docking the robot as well as retraction and suturing. This text is generated with voice recognition software. There may be translation, syntax,  or grammatical errors.  If you have any questions, please contact the dictating provider. I was present for the entire procedure.     Patient Disposition:  PACU         SIGNATURE: Dmitry Laguna MD  DATE: October 5, 2023  TIME: 10:54 AM

## 2023-10-05 NOTE — H&P
History & Physical    Abdiel Ingram    58 y.o.  male  [id number]  Keshav Mcadams MD  Date: 2023    Assessment:  Patient Active Problem List   Diagnosis   • Hyperlipidemia   • Hypertension   • Obesity (BMI 30-39. 9)   • Tinea cruris   • Lumbar spondylosis   • Chronic bilateral low back pain without sciatica   • Prediabetes   • Left inguinal hernia   • Elevated PSA     Plan:  Abdiel Ingram is scheduled for robotic left inguinal hernia    HPI    Historical Information   Past Medical History:   Diagnosis Date   • COVID-19 2023   • Dermatitis, contact     last assessed. .... 13    resolved. ..14    • Diverticulitis of colon     last assessed. ..10/10/12   resolved. ...14   • Hematuria     last assessed. Denise Rizzo 1/20/15  resolved. .... 2/6/15   • Hyperlipidemia    • Hypertension     unspecified type. ..... last assessed. Denise Eleanoryessenia Rizzo 18   • Rotator cuff tendinitis     unspecified laterality    last assessed. ... 13   resolved. ....14     • Sebaceous cyst     last assessed. ....14   resolved. ... 2/6/15   • Swelling of wrist joint     right. ..... last assessed. ... 2/6/15    resolved. ..... 4/6/15     Past Surgical History:   Procedure Laterality Date   • COLONOSCOPY      complete     Social History   Social History     Substance and Sexual Activity   Alcohol Use Yes   • Alcohol/week: 2.0 standard drinks of alcohol   • Types: 2 Cans of beer per week    Comment: social - rare     Social History     Substance and Sexual Activity   Drug Use No     Social History     Tobacco Use   Smoking Status Never   Smokeless Tobacco Never     Family History   Problem Relation Age of Onset   • Brain cancer Mother    • Colon cancer Mother    • Heart attack Father         acute /  at 46    • Colon cancer Brother    • Hypertension Brother         Meds/Allergies   Allergies   Allergen Reactions   • Celecoxib Syncope       Current Facility-Administered Medications:   •  acetaminophen (Ofirmev) injection 1,000 mg, 1,000 mg, Intravenous, Once, Kirit Santos,   •  ceFAZolin (ANCEF) IVPB (premix in dextrose) 2,000 mg 50 mL, 2,000 mg, Intravenous, Once, Festus Ghosh MD  •  lactated ringers infusion, 125 mL/hr, Intravenous, Continuous, Festus Ghosh MD  •  sodium chloride 0.9 % infusion, 125 mL/hr, Intravenous, Continuous, Jazmin Copper, DO, Last Rate: 125 mL/hr at 10/05/23 0632, 125 mL/hr at 10/05/23 3001    Review of Systems    Vitals:    10/05/23 0608   BP: 144/76   Pulse: 79   Resp: 16   Temp: 97.9 °F (36.6 °C)   SpO2: 100%     Physical Exam  GEN: NAD, A+OX3   HEENT: Normocephalic, atraumatic,   NECK: Supple, trachea midline,   CARDIAC: regular rate & rhythm, S1 & S2 normal.   LUNGS: Clear to auscultation, No Wheeze, Rales, or Rhonchi  ABDOMEN: Left inguinal hernia  EXTREMITIES: No evidence of cyanosis, clubbing or edema. Pulses +2 B/L LE  NEURO: CN II-XII intact grossly, No sensory or motor deficits    Lab Results: I have personally reviewed pertinent lab results. Imaging: I have personally reviewed pertinent reports.     EKG, Pathology, and Other Studies:   Lab Results   Component Value Date    GLUCOSE 90 10/06/2015    CALCIUM 9.5 08/28/2023     10/06/2015    K 4.1 08/28/2023    CO2 29 08/28/2023     08/28/2023    BUN 11 08/28/2023    CREATININE 0.95 08/28/2023     Lab Results   Component Value Date    WBC 4.98 08/28/2023    HGB 13.6 08/28/2023    HCT 42.8 08/28/2023    MCV 88 08/28/2023     08/28/2023     Lab Results   Component Value Date    ALT 31 10/10/2022    AST 20 10/10/2022    ALKPHOS 124 (H) 10/10/2022    BILITOT 0.39 10/06/2015

## 2023-10-05 NOTE — DISCHARGE INSTR - AVS FIRST PAGE
Maddy Malik MD, FACS     [phone]          1. General: You will feel pulling sensations around the wound or funny aches and pains around the incisions. This is normal. Even minor surgery is a change in your body and this is your body’s way of reacting to it. If you have had abdominal surgery, it may help to support the incision with a small pillow or blanket for comfort when moving or coughing. 2. Wound care:  Okay to shower. The glue will fall off over the next week or 2. Use ice for at least the 1st 48 hours. Do not use for longer than 20 minutes at a time. Use 3 times per day. 3. Water: You may shower over the wounds. Do not bathe or use a pool or hot tub until cleared by the physician. If you were discharged with a drain, make sure drain site is covered with plastic wrap before showering. 4. Activity: You may go up and down stairs, walk as much as you are comfortable, but walk at least 3 times each day. If you have had abdominal surgery, do not lift anything heavier than 20 pounds for at least 4 weeks. 5. Diet: You may resume a regular diet. If you had a same-day surgery or overnight stay surgery, you may wish to eat lightly for a few days: soups, crackers, and sandwiches. You may resume a regular diet when ready. 6. Medications: Resume all of your previous medications, unless told otherwise by the doctor. Avoid aspirin products for 2-3 days after the date of surgery. You may, at that time, began to take them again. Use Tylenol and Ibuprofen for pain control. You may alternate these medications every 3 hours. For example: you may take Tylenol at noon, Ibuprofen at 3:00 p.m., and Tylenol again at 6:00 p.m., etc. You should use ice to assist with pain control as above. You do not need to take narcotic pain medication unless you are having significant pain.    If you were prescribed a narcotic pain medication containing Tylenol, such as Percocet or Norco, do not use supplemental Tylenol. 7. Driving: You will need someone to drive you home on the day of surgery or discharge. Do not drive or make any important decisions while on narcotic pain medication or 24 hours and after anesthesia or sedation for surgery. Generally, you may drive when your off all narcotic pain medications and you are comfortable. 8. Upset Stomach: You may take Maalox, Tums, or similar items for an upset stomach. If your narcotic pain medication causes an upset stomach, do not take it on an empty stomach. Try taking it with at least some crackers or toast.      9. Constipation: Patients often experience constipation after surgery. You may take over-the-counter medication for this, such as Metamucil, Senokot, Dulcolax, milk of magnesia, etc. You may take a suppository unless you have had anorectal surgery such as a procedure on your hemorrhoids. If you experience significant nausea or vomiting after abdominal surgery, call the office before trying any of these medications. 10. Call the office: If you are experiencing any of the following: fevers above 101.5°, significant nausea or vomiting, if the wound develops drainage and/or there is excessive redness around the wound, or if you have significant diarrhea or other worsening symptoms. 11. Pain: You may be given a prescription for pain medication. This will be sent to your pharmacy prior to discharge. 12. Sexual Activity: You may resume sexual activity when you feel ready and comfortable and your incision is sealed and healed without apparent infection risk. 13. Urination: If you have not urinated in 6 hours, go directly to the ER for evaluation for urinary retention. 14. Follow-up in 2 weeks.           **READ ONLY IF YOU HAVE BEEN DISCHARGED WITH A URINARY CATHETER**    Wallace Insertion for Post-Op Urinary Retention        - A prescription for Flomax will be sent to your pharmacy. This should be taken daily while the urinary catheter remains in place. You will not be given a prescription for Flomax if your prostate has been removed. If you are already taking Flomax, continue the medication as prescribed. - We will send a message to the urology group who will contact you within the next 48 hours with further instructions and to schedule an appointment for voiding trial and catheter removal.  The urinary catheter will remain in place for approximately 1 week. If you are not contacted within the next 48 hours please call our office to assist with scheduling your follow-up.      - If you have your own urologist, you should contact your physician the day after discharge for instructions and to schedule a voiding trial and catheter removal.

## 2023-10-05 NOTE — ANESTHESIA POSTPROCEDURE EVALUATION
Post-Op Assessment Note    CV Status:  Stable    Pain management: adequate     Mental Status:  Alert and awake   Hydration Status:  Euvolemic   PONV Controlled:  Controlled   Airway Patency:  Patent      Post Op Vitals Reviewed: Yes            No notable events documented.     BP      Temp     Pulse     Resp      SpO2      /67   Pulse 80   Temp (!) 97 °F (36.1 °C)   Resp 18   Wt 94.9 kg (209 lb 3.5 oz)   SpO2 95%   BMI 30.02 kg/m²

## 2023-10-05 NOTE — ANESTHESIA PREPROCEDURE EVALUATION
Procedure:  REPAIR HERNIA INGUINAL  W/ ROBOT (Left: Groin)    Relevant Problems   CARDIO   (+) Hyperlipidemia   (+) Hypertension      MUSCULOSKELETAL   (+) Chronic bilateral low back pain without sciatica   (+) Lumbar spondylosis      NEURO/PSYCH   (+) Chronic bilateral low back pain without sciatica        Physical Exam    Airway    Mallampati score: II         Dental       Cardiovascular  Rhythm: regular, Rate: normal,     Pulmonary  Breath sounds clear to auscultation,     Other Findings        Anesthesia Plan  ASA Score- 2     Anesthesia Type- general with ASA Monitors. Additional Monitors:   Airway Plan:           Plan Factors-Exercise tolerance (METS): >4 METS. Chart reviewed. Existing labs reviewed. Patient summary reviewed. Patient is not a current smoker. Patient not instructed to abstain from smoking on day of procedure. Patient did not smoke on day of surgery. Obstructive sleep apnea risk education given perioperatively.     Induction-     Postoperative Plan-     Informed Consent-

## 2023-10-18 ENCOUNTER — OFFICE VISIT (OUTPATIENT)
Dept: SURGERY | Facility: CLINIC | Age: 63
End: 2023-10-18

## 2023-10-18 VITALS — BODY MASS INDEX: 29.6 KG/M2 | HEIGHT: 70 IN | TEMPERATURE: 97.8 F | WEIGHT: 206.8 LBS | RESPIRATION RATE: 16 BRPM

## 2023-10-18 DIAGNOSIS — Z98.890 STATUS POST LAPAROSCOPIC HERNIA REPAIR: Primary | ICD-10-CM

## 2023-10-18 DIAGNOSIS — Z87.19 STATUS POST LAPAROSCOPIC HERNIA REPAIR: Primary | ICD-10-CM

## 2023-10-18 PROCEDURE — 99024 POSTOP FOLLOW-UP VISIT: CPT | Performed by: SURGERY

## 2023-10-18 NOTE — ASSESSMENT & PLAN NOTE
Overall he is doing well. His pain is resolved. Having some queasiness but slowly improving. He can return to work on Monday. He is lifting restrictions of 2 more weeks.   He can follow-up as needed

## 2023-10-18 NOTE — LETTER
October 18, 2023     Patient: Mynor Vieira  YOB: 1960  Date of Visit: 10/18/2023      To Whom it May Concern:    Mynor Vieira is under my professional care. Rhonda Munson was seen in my office on 10/18/2023. Rhonda Munson may return to work on 10/23/23 . If you have any questions or concerns, please don't hesitate to call.          Sincerely,          Dilma Davis MD        CC: No Recipients

## 2023-10-18 NOTE — PROGRESS NOTES
Assessment/Plan:    Status post laparoscopic hernia repair  Overall he is doing well. His pain is resolved. Having some queasiness but slowly improving. He can return to work on Monday. He is lifting restrictions of 2 more weeks. He can follow-up as needed       Diagnoses and all orders for this visit:    Status post laparoscopic hernia repair          Subjective:      Patient ID: Dayanna Parnell is a 58 y.o. male. Mr. Ashley Kim is a 70-year-old gentleman here for evaluation of left inguinal hernia. He states he started having some discomfort in this area a while ago. Is referred for an ultrasound. Since then things have increased in size and he was seen by urology. They confirmed a left inguinal hernia and referred him for evaluation. He has some pain in the area. Denies nausea vomiting fevers or chills. 10/18/2023. He is now status post robotic repair of a large left inguinal hernia with mesh. The following portions of the patient's history were reviewed and updated as appropriate: allergies, current medications, past family history, past medical history, past social history, past surgical history and problem list.    Review of Systems      Objective:      Temp 97.8 °F (36.6 °C)   Resp 16   Ht 5' 10" (1.778 m)   Wt 93.8 kg (206 lb 12.8 oz)   BMI 29.67 kg/m²          Physical Exam  Abdominal:      Comments: Incisions well-healed.   No evidence of recurrent hernia

## 2023-10-24 DIAGNOSIS — I10 ESSENTIAL HYPERTENSION: ICD-10-CM

## 2023-10-24 RX ORDER — AMLODIPINE BESYLATE 10 MG/1
TABLET ORAL
Qty: 90 TABLET | Refills: 0 | Status: SHIPPED | OUTPATIENT
Start: 2023-10-24

## 2023-10-25 DIAGNOSIS — R51.9 NONINTRACTABLE HEADACHE, UNSPECIFIED CHRONICITY PATTERN, UNSPECIFIED HEADACHE TYPE: ICD-10-CM

## 2023-10-25 RX ORDER — NAPROXEN 500 MG/1
TABLET ORAL
Qty: 60 TABLET | Refills: 0 | Status: SHIPPED | OUTPATIENT
Start: 2023-10-25

## 2023-11-22 DIAGNOSIS — R51.9 NONINTRACTABLE HEADACHE, UNSPECIFIED CHRONICITY PATTERN, UNSPECIFIED HEADACHE TYPE: ICD-10-CM

## 2023-11-22 RX ORDER — NAPROXEN 500 MG/1
TABLET ORAL
Qty: 60 TABLET | Refills: 0 | Status: SHIPPED | OUTPATIENT
Start: 2023-11-22

## 2023-12-19 DIAGNOSIS — R51.9 NONINTRACTABLE HEADACHE, UNSPECIFIED CHRONICITY PATTERN, UNSPECIFIED HEADACHE TYPE: ICD-10-CM

## 2023-12-19 RX ORDER — NAPROXEN 500 MG/1
TABLET ORAL
Qty: 60 TABLET | Refills: 0 | Status: SHIPPED | OUTPATIENT
Start: 2023-12-19

## 2024-01-17 DIAGNOSIS — R51.9 NONINTRACTABLE HEADACHE, UNSPECIFIED CHRONICITY PATTERN, UNSPECIFIED HEADACHE TYPE: ICD-10-CM

## 2024-01-17 RX ORDER — NAPROXEN 500 MG/1
TABLET ORAL
Qty: 60 TABLET | Refills: 0 | Status: SHIPPED | OUTPATIENT
Start: 2024-01-17

## 2024-01-20 DIAGNOSIS — I10 ESSENTIAL HYPERTENSION: ICD-10-CM

## 2024-01-22 RX ORDER — AMLODIPINE BESYLATE 10 MG/1
TABLET ORAL
Qty: 90 TABLET | Refills: 0 | Status: SHIPPED | OUTPATIENT
Start: 2024-01-22

## 2024-01-24 ENCOUNTER — HOSPITAL ENCOUNTER (OUTPATIENT)
Dept: CT IMAGING | Facility: HOSPITAL | Age: 64
Discharge: HOME | End: 2024-01-24
Attending: SURGERY
Payer: COMMERCIAL

## 2024-01-24 ENCOUNTER — APPOINTMENT (OUTPATIENT)
Dept: LAB | Facility: HOSPITAL | Age: 64
End: 2024-01-24
Payer: COMMERCIAL

## 2024-01-24 ENCOUNTER — OFFICE VISIT (OUTPATIENT)
Dept: SURGERY | Facility: CLINIC | Age: 64
End: 2024-01-24
Payer: COMMERCIAL

## 2024-01-24 VITALS
SYSTOLIC BLOOD PRESSURE: 110 MMHG | TEMPERATURE: 97.5 F | BODY MASS INDEX: 29.78 KG/M2 | WEIGHT: 208 LBS | HEART RATE: 83 BPM | OXYGEN SATURATION: 99 % | HEIGHT: 70 IN | DIASTOLIC BLOOD PRESSURE: 72 MMHG

## 2024-01-24 DIAGNOSIS — Z87.19 STATUS POST LAPAROSCOPIC HERNIA REPAIR: Primary | ICD-10-CM

## 2024-01-24 DIAGNOSIS — Z98.890 STATUS POST LAPAROSCOPIC HERNIA REPAIR: Primary | ICD-10-CM

## 2024-01-24 DIAGNOSIS — Z98.890 STATUS POST LAPAROSCOPIC HERNIA REPAIR: ICD-10-CM

## 2024-01-24 DIAGNOSIS — R10.84 GENERALIZED ABDOMINAL PAIN: ICD-10-CM

## 2024-01-24 DIAGNOSIS — Z87.19 STATUS POST LAPAROSCOPIC HERNIA REPAIR: ICD-10-CM

## 2024-01-24 LAB
ANION GAP SERPL CALCULATED.3IONS-SCNC: 4 MMOL/L
BASOPHILS # BLD AUTO: 0.04 THOUSANDS/ÂΜL (ref 0–0.1)
BASOPHILS NFR BLD AUTO: 1 % (ref 0–1)
BUN SERPL-MCNC: 14 MG/DL (ref 5–25)
CALCIUM SERPL-MCNC: 9.3 MG/DL (ref 8.4–10.2)
CHLORIDE SERPL-SCNC: 106 MMOL/L (ref 96–108)
CO2 SERPL-SCNC: 30 MMOL/L (ref 21–32)
CREAT SERPL-MCNC: 0.97 MG/DL (ref 0.6–1.3)
EOSINOPHIL # BLD AUTO: 0.02 THOUSAND/ÂΜL (ref 0–0.61)
EOSINOPHIL NFR BLD AUTO: 1 % (ref 0–6)
ERYTHROCYTE [DISTWIDTH] IN BLOOD BY AUTOMATED COUNT: 15.6 % (ref 11.6–15.1)
GFR SERPL CREATININE-BSD FRML MDRD: 82 ML/MIN/1.73SQ M
GLUCOSE SERPL-MCNC: 102 MG/DL (ref 65–140)
HCT VFR BLD AUTO: 42.5 % (ref 36.5–49.3)
HGB BLD-MCNC: 13.8 G/DL (ref 12–17)
IMM GRANULOCYTES # BLD AUTO: 0.01 THOUSAND/UL (ref 0–0.2)
IMM GRANULOCYTES NFR BLD AUTO: 0 % (ref 0–2)
LYMPHOCYTES # BLD AUTO: 1.33 THOUSANDS/ÂΜL (ref 0.6–4.47)
LYMPHOCYTES NFR BLD AUTO: 30 % (ref 14–44)
MCH RBC QN AUTO: 28 PG (ref 26.8–34.3)
MCHC RBC AUTO-ENTMCNC: 32.5 G/DL (ref 31.4–37.4)
MCV RBC AUTO: 86 FL (ref 82–98)
MONOCYTES # BLD AUTO: 0.44 THOUSAND/ÂΜL (ref 0.17–1.22)
MONOCYTES NFR BLD AUTO: 10 % (ref 4–12)
NEUTROPHILS # BLD AUTO: 2.6 THOUSANDS/ÂΜL (ref 1.85–7.62)
NEUTS SEG NFR BLD AUTO: 58 % (ref 43–75)
NRBC BLD AUTO-RTO: 0 /100 WBCS
PLATELET # BLD AUTO: 225 THOUSANDS/UL (ref 149–390)
PMV BLD AUTO: 8.7 FL (ref 8.9–12.7)
POTASSIUM SERPL-SCNC: 3.8 MMOL/L (ref 3.5–5.3)
RBC # BLD AUTO: 4.92 MILLION/UL (ref 3.88–5.62)
SODIUM SERPL-SCNC: 140 MMOL/L (ref 135–147)
WBC # BLD AUTO: 4.44 THOUSAND/UL (ref 4.31–10.16)

## 2024-01-24 PROCEDURE — 74177 CT ABD & PELVIS W/CONTRAST: CPT

## 2024-01-24 PROCEDURE — 36415 COLL VENOUS BLD VENIPUNCTURE: CPT

## 2024-01-24 PROCEDURE — 99213 OFFICE O/P EST LOW 20 MIN: CPT | Performed by: SURGERY

## 2024-01-24 PROCEDURE — G1004 CDSM NDSC: HCPCS

## 2024-01-24 PROCEDURE — 80048 BASIC METABOLIC PNL TOTAL CA: CPT

## 2024-01-24 PROCEDURE — 85025 COMPLETE CBC W/AUTO DIFF WBC: CPT

## 2024-01-24 RX ADMIN — IOHEXOL 100 ML: 350 INJECTION, SOLUTION INTRAVENOUS at 12:58

## 2024-01-24 NOTE — PROGRESS NOTES
Assessment/Plan:    Generalized abdominal pain  I am concerned that he has been having the symptoms for months.  He is not in acute distress that needs to be sent to the emergency department however he needs to be evaluated.  I will check a CBC and BMP.  I also check a CT scan with IV and oral contrast hopefully be done today or tomorrow to rule out any intra-abdominal pathology.  If his CT is negative we will then consider referral to gastroenterology for reflux type symptoms.  Otherwise if his CT scan shows something we will address the plan accordingly.         Diagnoses and all orders for this visit:    Status post laparoscopic hernia repair  -     CT abdomen pelvis w contrast; Future  -     CBC and differential; Future  -     Basic metabolic panel; Future  -     CBC (Includes Diff/Plt) (Refl); Future  -     Basic metabolic panel; Future    Generalized abdominal pain  -     CT abdomen pelvis w contrast; Future  -     CBC and differential; Future  -     Basic metabolic panel; Future  -     CBC (Includes Diff/Plt) (Refl); Future  -     Basic metabolic panel; Future          Subjective:      Patient ID: Paresh Sanchez is a 63 y.o. male.    Mr. Sanchez is a 62-year-old gentleman here for evaluation of left inguinal hernia.  He states he started having some discomfort in this area a while ago.  Is referred for an ultrasound.  Since then things have increased in size and he was seen by urology.  They confirmed a left inguinal hernia and referred him for evaluation.  He has some pain in the area.  Denies nausea vomiting fevers or chills.    10/18/2023.  He is now status post robotic repair of a large left inguinal hernia with mesh.    1/24/2024 he returns with complaints of abdominal pain.  He states for the last month he has been having crampy abdominal pain in his upper abdomen.  It is present on a daily basis.  Also complaining of severe acid reflux in back of throat and occasional vomiting.  He has been decreasing  "his oral intake and is mostly on soups and stuff as it can make the pain worse.  He states he is having normal bowel habits denies diarrhea or constipation.  He denies fevers or chills.  He does not feel that the pain is movement related specifically but it is there persistently on a daily basis.  He denies pain in his groin.        The following portions of the patient's history were reviewed and updated as appropriate: allergies, current medications, past family history, past medical history, past social history, past surgical history and problem list.    Review of Systems   Constitutional:  Negative for chills and fever.   HENT:  Negative for ear pain and sore throat.    Eyes:  Negative for pain and visual disturbance.   Respiratory:  Negative for cough and shortness of breath.    Cardiovascular:  Negative for chest pain and palpitations.   Gastrointestinal:  Positive for abdominal pain, nausea and vomiting.   Musculoskeletal:  Negative for arthralgias and back pain.   Skin:  Negative for color change and rash.   Neurological:  Negative for seizures and syncope.   All other systems reviewed and are negative.        Objective:      /72 (BP Location: Right arm, Patient Position: Sitting, Cuff Size: Large)   Pulse 83   Temp 97.5 °F (36.4 °C) (Tympanic)   Ht 5' 10\" (1.778 m)   Wt 94.3 kg (208 lb)   SpO2 99%   BMI 29.84 kg/m²          Physical Exam  Vitals and nursing note reviewed. Exam conducted with a chaperone present.   Constitutional:       Appearance: Normal appearance.   Cardiovascular:      Rate and Rhythm: Normal rate and regular rhythm.   Pulmonary:      Effort: Pulmonary effort is normal.      Breath sounds: Normal breath sounds.   Abdominal:      General: There is no distension.      Palpations: Abdomen is soft. There is no mass.      Tenderness: There is no abdominal tenderness.      Comments: Surgical scars.  No evidence of recurrent left inguinal hernia.    Abdomen mildly distended with " tenderness but no peritonitis.   Neurological:      Mental Status: He is alert.   Psychiatric:         Mood and Affect: Mood normal.         Behavior: Behavior normal.

## 2024-01-24 NOTE — ASSESSMENT & PLAN NOTE
I am concerned that he has been having the symptoms for months.  He is not in acute distress that needs to be sent to the emergency department however he needs to be evaluated.  I will check a CBC and BMP.  I also check a CT scan with IV and oral contrast hopefully be done today or tomorrow to rule out any intra-abdominal pathology.  If his CT is negative we will then consider referral to gastroenterology for reflux type symptoms.  Otherwise if his CT scan shows something we will address the plan accordingly.

## 2024-02-13 DIAGNOSIS — R51.9 NONINTRACTABLE HEADACHE, UNSPECIFIED CHRONICITY PATTERN, UNSPECIFIED HEADACHE TYPE: ICD-10-CM

## 2024-02-13 RX ORDER — NAPROXEN 500 MG/1
TABLET ORAL
Qty: 60 TABLET | Refills: 5 | Status: SHIPPED | OUTPATIENT
Start: 2024-02-13

## 2024-02-27 DIAGNOSIS — E78.5 HYPERLIPIDEMIA, UNSPECIFIED HYPERLIPIDEMIA TYPE: ICD-10-CM

## 2024-02-27 RX ORDER — ATORVASTATIN CALCIUM 20 MG/1
TABLET, FILM COATED ORAL
Qty: 30 TABLET | Refills: 0 | Status: SHIPPED | OUTPATIENT
Start: 2024-02-27

## 2024-03-15 ENCOUNTER — OFFICE VISIT (OUTPATIENT)
Dept: FAMILY MEDICINE CLINIC | Facility: CLINIC | Age: 64
End: 2024-03-15
Payer: COMMERCIAL

## 2024-03-15 VITALS
HEART RATE: 80 BPM | WEIGHT: 205.4 LBS | OXYGEN SATURATION: 98 % | TEMPERATURE: 97 F | DIASTOLIC BLOOD PRESSURE: 74 MMHG | BODY MASS INDEX: 29.41 KG/M2 | SYSTOLIC BLOOD PRESSURE: 130 MMHG | RESPIRATION RATE: 16 BRPM | HEIGHT: 70 IN

## 2024-03-15 DIAGNOSIS — K29.70 GASTRITIS WITHOUT BLEEDING, UNSPECIFIED CHRONICITY, UNSPECIFIED GASTRITIS TYPE: ICD-10-CM

## 2024-03-15 DIAGNOSIS — I10 PRIMARY HYPERTENSION: ICD-10-CM

## 2024-03-15 DIAGNOSIS — Z23 ENCOUNTER FOR IMMUNIZATION: Primary | ICD-10-CM

## 2024-03-15 PROCEDURE — 90471 IMMUNIZATION ADMIN: CPT

## 2024-03-15 PROCEDURE — 90686 IIV4 VACC NO PRSV 0.5 ML IM: CPT

## 2024-03-15 PROCEDURE — 99214 OFFICE O/P EST MOD 30 MIN: CPT | Performed by: INTERNAL MEDICINE

## 2024-03-15 RX ORDER — OMEPRAZOLE 40 MG/1
40 CAPSULE, DELAYED RELEASE ORAL DAILY
Qty: 180 CAPSULE | Refills: 0 | Status: SHIPPED | OUTPATIENT
Start: 2024-03-15

## 2024-03-15 RX ORDER — DICYCLOMINE HCL 20 MG
20 TABLET ORAL EVERY 12 HOURS PRN
Qty: 90 TABLET | Refills: 0 | Status: SHIPPED | OUTPATIENT
Start: 2024-03-15

## 2024-03-15 NOTE — PATIENT INSTRUCTIONS
Daily Note     Today's date: 2022  Patient name: Rena Covarrubias  : 1956  MRN: [id number]  Referring provider: Kem Reyna DO  Dx:   Encounter Diagnosis     ICD-10-CM    1  Chronic right-sided low back pain without sciatica  M54 50     G89 29    2  Acute right hip pain  M25 551                   Subjective:  Patient states that she feels good  Objective: See treatment diary below  Assessment:  Patient demonstrates good tolerance to exercise progressions c/ decreased hip and back pain  Tolerated treatment well  Patient would benefit from continued PT  Plan: Continue per plan of care        Precautions:  N/A    Manuals 9/15 9/20 9/23 9/27         Long axis distraction AZ AZ           UPAs L5-S1 R AZ AZ AZ AZ         Piriformis release AZ AZ AZ AZ         R hip mobs  AZ AZ AZ         Neuro Re-Ed             Diaphragmaticbreathing 2' 2' 2' 2'          TA isometrics 10x5s 30x5s 30x5s 30x5s                                                Ther Ex             Hip add  20x5s 30x5s 30x5s         Hip abd    30x5s blue         Bridges 2x10 2s 2x10 2s 2x10 5s 3x10 5s         Piriformis str sup 3x30s 3x30s 3x30s 3x30s         Hip flexor str EOT  3x30s 3x30s 3x30s         Standing hip flexor str step 3x30s 3x30s 3x30s 3x30s                      Ther Activity                                       Gait Training                                       Modalities              10' 10' 10' np Please take omeprazole 40 mg twice a day for 2 weeks after that continue with once a day dosage for 2 weeks, after that you can continue daily as needed.  Please use Bentyl for cramping abdominal discomfort you can use it twice a day as needed.  If no improvement of your symptoms after above therapy he will let me know I will send you to gastroenterologist.

## 2024-03-15 NOTE — ASSESSMENT & PLAN NOTE
Clinical presentation consistent with gastritis.  Will start him on omeprazole 40 mg twice a day for 2 weeks then taper down to once daily dosage.  Thank you may continue to use it as needed.  Cut back on coffee, chocolate, tomatoes and spicy food.  Avoid NSAIDs.  If no improvement with above measures we will consider referral to GI service for further workup.

## 2024-03-15 NOTE — PROGRESS NOTES
Assessment/Plan:    Hypertension  Controlled on current meds.  Continue the same.    Gastritis without bleeding  Clinical presentation consistent with gastritis.  Will start him on omeprazole 40 mg twice a day for 2 weeks then taper down to once daily dosage.  Thank you may continue to use it as needed.  Cut back on coffee, chocolate, tomatoes and spicy food.  Avoid NSAIDs.  If no improvement with above measures we will consider referral to GI service for further workup.       Diagnoses and all orders for this visit:    Encounter for immunization  -     influenza vaccine, quadrivalent, 0.5 mL, preservative-free, for adult and pediatric patients 6 mos+ (AFLURIA, FLUARIX, FLULAVAL, FLUZONE)    Gastritis without bleeding, unspecified chronicity, unspecified gastritis type  -     omeprazole (PriLOSEC) 40 MG capsule; Take 1 capsule (40 mg total) by mouth daily  -     dicyclomine (BENTYL) 20 mg tablet; Take 1 tablet (20 mg total) by mouth every 12 (twelve) hours as needed (bloating)    Primary hypertension          Subjective:      Patient ID: Paresh Sanchez is a 63 y.o. male.    Patient came today with a new complaint of abdominal bloating and discomfort that he developed about 3 months ago.  He reported that he changed his diet drastically, he started to eat more vegetables, he lost some weight intentionally due to that.    Abdominal Pain  Pertinent negatives include no constipation, diarrhea, fever, nausea or vomiting.       The following portions of the patient's history were reviewed and updated as appropriate: allergies, current medications, past family history, past medical history, past social history, past surgical history, and problem list.    Review of Systems   Constitutional:  Negative for chills, fever and unexpected weight change.   Gastrointestinal:  Positive for abdominal pain. Negative for abdominal distention, anal bleeding, blood in stool, constipation, diarrhea, nausea, rectal pain and vomiting.      "    Objective:      /74 (BP Location: Left arm, Patient Position: Sitting, Cuff Size: Large)   Pulse 80   Temp (!) 97 °F (36.1 °C) (Tympanic)   Resp 16   Ht 5' 10\" (1.778 m)   Wt 93.2 kg (205 lb 6.4 oz)   SpO2 98%   BMI 29.47 kg/m²     Allergies   Allergen Reactions    Celecoxib Syncope          Current Outpatient Medications:     amLODIPine (NORVASC) 10 mg tablet, TAKE 1 TABLET BY MOUTH EVERY DAY, Disp: 90 tablet, Rfl: 0    atorvastatin (LIPITOR) 20 mg tablet, TAKE 1 TABLET BY MOUTH EVERY DAY, Disp: 30 tablet, Rfl: 0    Cholecalciferol (Vitamin D3) 25 MCG TABS, TAKE 1 TABLET (1,000 UNITS TOTAL) BY MOUTH DAILY START AFTER DONE WITH THE HIGH-DOSE., Disp: 90 tablet, Rfl: 3    dicyclomine (BENTYL) 20 mg tablet, Take 1 tablet (20 mg total) by mouth every 12 (twelve) hours as needed (bloating), Disp: 90 tablet, Rfl: 0    fluticasone (FLONASE) 50 mcg/act nasal spray, 2 sprays into each nostril daily as needed, Disp: , Rfl:     gabapentin (NEURONTIN) 300 mg capsule, TAKE 1 CAPSULE BY MOUTH AT BEDTIME, Disp: 90 capsule, Rfl: 2    naproxen (NAPROSYN) 500 mg tablet, TAKE 1 TABLET BY MOUTH 2 (TWO) TIMES A DAY AS NEEDED FOR MODERATE PAIN (TAKE WITH FOOD.), Disp: 60 tablet, Rfl: 5    omeprazole (PriLOSEC) 40 MG capsule, Take 1 capsule (40 mg total) by mouth daily, Disp: 180 capsule, Rfl: 0    sildenafil (VIAGRA) 50 MG tablet, , Disp: , Rfl:      Patient Instructions   Please take omeprazole 40 mg twice a day for 2 weeks after that continue with once a day dosage for 2 weeks, after that you can continue daily as needed.  Please use Bentyl for cramping abdominal discomfort you can use it twice a day as needed.  If no improvement of your symptoms after above therapy he will let me know I will send you to gastroenterologist.          Physical Exam  Constitutional:       General: He is not in acute distress.     Appearance: He is not ill-appearing or toxic-appearing.   Cardiovascular:      Rate and Rhythm: Normal rate. "      Pulses: Normal pulses.   Abdominal:      General: Bowel sounds are normal. There is distension.      Palpations: There is no mass.      Tenderness: There is abdominal tenderness (Epigastric). There is no guarding or rebound.      Hernia: No hernia is present.

## 2024-04-10 DIAGNOSIS — K29.70 GASTRITIS WITHOUT BLEEDING, UNSPECIFIED CHRONICITY, UNSPECIFIED GASTRITIS TYPE: ICD-10-CM

## 2024-04-10 RX ORDER — DICYCLOMINE HCL 20 MG
20 TABLET ORAL EVERY 12 HOURS PRN
Qty: 60 TABLET | Refills: 1 | Status: SHIPPED | OUTPATIENT
Start: 2024-04-10

## 2024-05-03 DIAGNOSIS — I10 ESSENTIAL HYPERTENSION: ICD-10-CM

## 2024-05-03 RX ORDER — AMLODIPINE BESYLATE 10 MG/1
TABLET ORAL
Qty: 90 TABLET | Refills: 1 | Status: SHIPPED | OUTPATIENT
Start: 2024-05-03

## 2024-06-12 DIAGNOSIS — K29.70 GASTRITIS WITHOUT BLEEDING, UNSPECIFIED CHRONICITY, UNSPECIFIED GASTRITIS TYPE: ICD-10-CM

## 2024-06-12 RX ORDER — DICYCLOMINE HCL 20 MG
20 TABLET ORAL EVERY 12 HOURS PRN
Qty: 60 TABLET | Refills: 5 | Status: SHIPPED | OUTPATIENT
Start: 2024-06-12

## 2024-11-23 DIAGNOSIS — E55.9 VITAMIN D DEFICIENCY: ICD-10-CM

## 2024-11-25 RX ORDER — CHOLECALCIFEROL (VITAMIN D3) 25 MCG
TABLET ORAL
Qty: 90 TABLET | Refills: 3 | Status: SHIPPED | OUTPATIENT
Start: 2024-11-25

## 2024-11-25 NOTE — TELEPHONE ENCOUNTER
Pt lost his job and has no insurance. He is waiting to be hired at a new job and will only then be able to schedule something.

## 2025-07-02 ENCOUNTER — OFFICE VISIT (OUTPATIENT)
Dept: FAMILY MEDICINE CLINIC | Facility: CLINIC | Age: 65
End: 2025-07-02
Payer: COMMERCIAL

## 2025-07-02 VITALS
DIASTOLIC BLOOD PRESSURE: 92 MMHG | HEART RATE: 100 BPM | WEIGHT: 195 LBS | SYSTOLIC BLOOD PRESSURE: 158 MMHG | OXYGEN SATURATION: 98 % | HEIGHT: 68 IN | BODY MASS INDEX: 29.55 KG/M2

## 2025-07-02 DIAGNOSIS — E55.9 VITAMIN D DEFICIENCY: ICD-10-CM

## 2025-07-02 DIAGNOSIS — E78.5 HYPERLIPIDEMIA, UNSPECIFIED HYPERLIPIDEMIA TYPE: ICD-10-CM

## 2025-07-02 DIAGNOSIS — I10 ESSENTIAL HYPERTENSION: ICD-10-CM

## 2025-07-02 DIAGNOSIS — Z13.0 SCREENING FOR DEFICIENCY ANEMIA: ICD-10-CM

## 2025-07-02 DIAGNOSIS — G62.9 NEUROPATHY: ICD-10-CM

## 2025-07-02 DIAGNOSIS — K29.70 GASTRITIS WITHOUT BLEEDING, UNSPECIFIED CHRONICITY, UNSPECIFIED GASTRITIS TYPE: ICD-10-CM

## 2025-07-02 DIAGNOSIS — R51.9 NONINTRACTABLE HEADACHE, UNSPECIFIED CHRONICITY PATTERN, UNSPECIFIED HEADACHE TYPE: ICD-10-CM

## 2025-07-02 DIAGNOSIS — Z12.5 SCREENING FOR PROSTATE CANCER: ICD-10-CM

## 2025-07-02 DIAGNOSIS — R73.03 PREDIABETES: ICD-10-CM

## 2025-07-02 DIAGNOSIS — R41.3 MEMORY PROBLEM: Primary | ICD-10-CM

## 2025-07-02 DIAGNOSIS — I10 PRIMARY HYPERTENSION: ICD-10-CM

## 2025-07-02 DIAGNOSIS — Z13.89 SCREENING FOR BLOOD OR PROTEIN IN URINE: ICD-10-CM

## 2025-07-02 PROCEDURE — 99214 OFFICE O/P EST MOD 30 MIN: CPT | Performed by: INTERNAL MEDICINE

## 2025-07-02 RX ORDER — ATORVASTATIN CALCIUM 20 MG/1
20 TABLET, FILM COATED ORAL DAILY
Qty: 30 TABLET | Refills: 0 | Status: CANCELLED | OUTPATIENT
Start: 2025-07-02

## 2025-07-02 RX ORDER — FLUTICASONE PROPIONATE 50 MCG
2 SPRAY, SUSPENSION (ML) NASAL DAILY PRN
Status: CANCELLED | OUTPATIENT
Start: 2025-07-02

## 2025-07-02 RX ORDER — NAPROXEN 500 MG/1
TABLET ORAL
Qty: 60 TABLET | Refills: 5 | Status: CANCELLED | OUTPATIENT
Start: 2025-07-02

## 2025-07-02 RX ORDER — SILDENAFIL 50 MG/1
TABLET, FILM COATED ORAL
Qty: 10 TABLET | Status: CANCELLED | OUTPATIENT
Start: 2025-07-02

## 2025-07-02 RX ORDER — GABAPENTIN 300 MG/1
300 CAPSULE ORAL
Qty: 90 CAPSULE | Refills: 2 | Status: SHIPPED | OUTPATIENT
Start: 2025-07-02

## 2025-07-02 RX ORDER — CHOLECALCIFEROL (VITAMIN D3) 25 MCG
TABLET ORAL
Qty: 90 TABLET | Refills: 3 | Status: CANCELLED | OUTPATIENT
Start: 2025-07-02

## 2025-07-02 RX ORDER — AMLODIPINE BESYLATE 10 MG/1
10 TABLET ORAL DAILY
Qty: 90 TABLET | Refills: 1 | Status: SHIPPED | OUTPATIENT
Start: 2025-07-02

## 2025-07-02 RX ORDER — DICYCLOMINE HCL 20 MG
20 TABLET ORAL EVERY 12 HOURS PRN
Qty: 60 TABLET | Refills: 5 | Status: CANCELLED | OUTPATIENT
Start: 2025-07-02

## 2025-07-02 RX ORDER — OMEPRAZOLE 40 MG/1
40 CAPSULE, DELAYED RELEASE ORAL DAILY
Qty: 180 CAPSULE | Refills: 0 | Status: CANCELLED | OUTPATIENT
Start: 2025-07-02

## 2025-07-02 RX ORDER — GABAPENTIN 300 MG/1
300 CAPSULE ORAL
Qty: 90 CAPSULE | Refills: 2 | Status: CANCELLED | OUTPATIENT
Start: 2025-07-02

## 2025-07-03 ENCOUNTER — APPOINTMENT (OUTPATIENT)
Dept: LAB | Facility: MEDICAL CENTER | Age: 65
End: 2025-07-03
Payer: COMMERCIAL

## 2025-07-03 DIAGNOSIS — R73.03 PREDIABETES: ICD-10-CM

## 2025-07-03 DIAGNOSIS — R41.3 MEMORY PROBLEM: ICD-10-CM

## 2025-07-03 DIAGNOSIS — E78.5 HYPERLIPIDEMIA, UNSPECIFIED HYPERLIPIDEMIA TYPE: ICD-10-CM

## 2025-07-03 DIAGNOSIS — Z13.0 SCREENING FOR DEFICIENCY ANEMIA: ICD-10-CM

## 2025-07-03 DIAGNOSIS — Z13.89 SCREENING FOR BLOOD OR PROTEIN IN URINE: ICD-10-CM

## 2025-07-03 LAB
25(OH)D3 SERPL-MCNC: 21.3 NG/ML (ref 30–100)
ALBUMIN SERPL BCG-MCNC: 4.3 G/DL (ref 3.5–5)
ALP SERPL-CCNC: 93 U/L (ref 34–104)
ALT SERPL W P-5'-P-CCNC: 21 U/L (ref 7–52)
ANION GAP SERPL CALCULATED.3IONS-SCNC: 6 MMOL/L (ref 4–13)
AST SERPL W P-5'-P-CCNC: 28 U/L (ref 13–39)
BASOPHILS # BLD AUTO: 0.05 THOUSANDS/ÂΜL (ref 0–0.1)
BASOPHILS NFR BLD AUTO: 1 % (ref 0–1)
BILIRUB SERPL-MCNC: 0.69 MG/DL (ref 0.2–1)
BILIRUB UR QL STRIP: NEGATIVE
BUN SERPL-MCNC: 8 MG/DL (ref 5–25)
CALCIUM SERPL-MCNC: 9.2 MG/DL (ref 8.4–10.2)
CHLORIDE SERPL-SCNC: 105 MMOL/L (ref 96–108)
CHOLEST SERPL-MCNC: 257 MG/DL (ref ?–200)
CLARITY UR: CLEAR
CO2 SERPL-SCNC: 31 MMOL/L (ref 21–32)
COLOR UR: NORMAL
CREAT SERPL-MCNC: 0.93 MG/DL (ref 0.6–1.3)
EOSINOPHIL # BLD AUTO: 0.03 THOUSAND/ÂΜL (ref 0–0.61)
EOSINOPHIL NFR BLD AUTO: 1 % (ref 0–6)
ERYTHROCYTE [DISTWIDTH] IN BLOOD BY AUTOMATED COUNT: 15.7 % (ref 11.6–15.1)
EST. AVERAGE GLUCOSE BLD GHB EST-MCNC: 120 MG/DL
GFR SERPL CREATININE-BSD FRML MDRD: 86 ML/MIN/1.73SQ M
GLUCOSE P FAST SERPL-MCNC: 113 MG/DL (ref 65–99)
GLUCOSE UR STRIP-MCNC: NEGATIVE MG/DL
HBA1C MFR BLD: 5.8 %
HCT VFR BLD AUTO: 43 % (ref 36.5–49.3)
HDLC SERPL-MCNC: 92 MG/DL
HGB BLD-MCNC: 14.1 G/DL (ref 12–17)
HGB UR QL STRIP.AUTO: NEGATIVE
IMM GRANULOCYTES # BLD AUTO: 0.01 THOUSAND/UL (ref 0–0.2)
IMM GRANULOCYTES NFR BLD AUTO: 0 % (ref 0–2)
KETONES UR STRIP-MCNC: NEGATIVE MG/DL
LDLC SERPL CALC-MCNC: 155 MG/DL (ref 0–100)
LEUKOCYTE ESTERASE UR QL STRIP: NEGATIVE
LYMPHOCYTES # BLD AUTO: 2.34 THOUSANDS/ÂΜL (ref 0.6–4.47)
LYMPHOCYTES NFR BLD AUTO: 44 % (ref 14–44)
MCH RBC QN AUTO: 29.2 PG (ref 26.8–34.3)
MCHC RBC AUTO-ENTMCNC: 32.8 G/DL (ref 31.4–37.4)
MCV RBC AUTO: 89 FL (ref 82–98)
MONOCYTES # BLD AUTO: 0.53 THOUSAND/ÂΜL (ref 0.17–1.22)
MONOCYTES NFR BLD AUTO: 10 % (ref 4–12)
NEUTROPHILS # BLD AUTO: 2.38 THOUSANDS/ÂΜL (ref 1.85–7.62)
NEUTS SEG NFR BLD AUTO: 44 % (ref 43–75)
NITRITE UR QL STRIP: NEGATIVE
NONHDLC SERPL-MCNC: 165 MG/DL
NRBC BLD AUTO-RTO: 0 /100 WBCS
PH UR STRIP.AUTO: 7.5 [PH]
PLATELET # BLD AUTO: 253 THOUSANDS/UL (ref 149–390)
PMV BLD AUTO: 9.3 FL (ref 8.9–12.7)
POTASSIUM SERPL-SCNC: 4.1 MMOL/L (ref 3.5–5.3)
PROT SERPL-MCNC: 7.2 G/DL (ref 6.4–8.4)
PROT UR STRIP-MCNC: NEGATIVE MG/DL
RBC # BLD AUTO: 4.83 MILLION/UL (ref 3.88–5.62)
SODIUM SERPL-SCNC: 142 MMOL/L (ref 135–147)
SP GR UR STRIP.AUTO: 1.02 (ref 1–1.03)
TRIGL SERPL-MCNC: 50 MG/DL (ref ?–150)
TSH SERPL DL<=0.05 MIU/L-ACNC: 1.46 UIU/ML (ref 0.45–4.5)
UROBILINOGEN UR STRIP-ACNC: <2 MG/DL
VIT B12 SERPL-MCNC: 192 PG/ML (ref 180–914)
WBC # BLD AUTO: 5.34 THOUSAND/UL (ref 4.31–10.16)

## 2025-07-03 PROCEDURE — 83036 HEMOGLOBIN GLYCOSYLATED A1C: CPT

## 2025-07-03 PROCEDURE — 84443 ASSAY THYROID STIM HORMONE: CPT

## 2025-07-03 PROCEDURE — 80061 LIPID PANEL: CPT

## 2025-07-03 PROCEDURE — 36415 COLL VENOUS BLD VENIPUNCTURE: CPT

## 2025-07-03 PROCEDURE — 81003 URINALYSIS AUTO W/O SCOPE: CPT

## 2025-07-03 PROCEDURE — 82607 VITAMIN B-12: CPT

## 2025-07-03 PROCEDURE — 80053 COMPREHEN METABOLIC PANEL: CPT

## 2025-07-03 PROCEDURE — 82306 VITAMIN D 25 HYDROXY: CPT

## 2025-07-03 PROCEDURE — 85025 COMPLETE CBC W/AUTO DIFF WBC: CPT

## 2025-07-03 NOTE — ASSESSMENT & PLAN NOTE
He was not taking his amlodipine, he will restart.  Follow-up in 7 days.  Orders:    amLODIPine (NORVASC) 10 mg tablet; Take 1 tablet (10 mg total) by mouth daily

## 2025-07-03 NOTE — ASSESSMENT & PLAN NOTE
Patient came today with his wife they both report that they have noticed decline in his memory and change in behavior.  He is Mini-Mental status exam showed score 19/30.  Will order blood work to exclude any secondary causes of his problem.  Will also consider MRI for neuro quant and further referral to neuropsych.  Orders:    TSH, 3rd generation with Free T4 reflex; Future    Vitamin B12; Future

## 2025-07-03 NOTE — PROGRESS NOTES
Name: Paresh Sanchez      : 1960      MRN: [id number]  Encounter Provider: Charles Crocker MD  Encounter Date: 2025   [facility]    Assessment & Plan  Essential hypertension  He was not taking his amlodipine, he will restart.  Follow-up in 7 days.  Orders:    amLODIPine (NORVASC) 10 mg tablet; Take 1 tablet (10 mg total) by mouth daily    Hyperlipidemia, unspecified hyperlipidemia type  Recheck lipid panel.  Orders:    Comprehensive metabolic panel; Future    Lipid panel; Future    Vitamin D deficiency         Neuropathy  Continue gabapentin.  Orders:    gabapentin (NEURONTIN) 300 mg capsule; Take 1 capsule (300 mg total) by mouth daily at bedtime    Nonintractable headache, unspecified chronicity pattern, unspecified headache type         Gastritis without bleeding, unspecified chronicity, unspecified gastritis type         Memory problem  Patient came today with his wife they both report that they have noticed decline in his memory and change in behavior.  He is Mini-Mental status exam showed score 19/30.  Will order blood work to exclude any secondary causes of his problem.  Will also consider MRI for neuro quant and further referral to neuropsych.  Orders:    TSH, 3rd generation with Free T4 reflex; Future    Vitamin B12; Future    Primary hypertension         Prediabetes  Recheck hemoglobin A1c.  Orders:    Hemoglobin A1C; Future    Screening for blood or protein in urine    Orders:    UA (URINE) with reflex to Scope; Future     vitamin B12 deficiency due to maternal vitamin B12 deficiency    Orders:    Vitamin D 25 hydroxy; Future    Screening for deficiency anemia    Orders:    CBC and differential; Future    Screening for prostate cancer    Orders:    PSA, Total Screen; Future         History of Present Illness     Patient came today for follow-up on his chronic medical problems and to discuss current concern of developing  "memory problem with a period of few years.      Review of Systems   Constitutional:  Negative for chills and fever.   Respiratory:  Negative for cough, shortness of breath and wheezing.    Cardiovascular:  Negative for chest pain, palpitations and leg swelling.   Psychiatric/Behavioral:  Negative for agitation, behavioral problems, confusion and self-injury. The patient is not nervous/anxious.      Past Medical History[1]  Past Surgical History[2]  Family History[3]  Social History[4]  Medications[5]  Allergies   Allergen Reactions    Celecoxib Syncope     Immunization History   Administered Date(s) Administered    COVID-19 PFIZER VACCINE 0.3 ML IM 03/18/2021, 04/09/2021, 12/11/2021    INFLUENZA 10/09/2021, 10/21/2022    Influenza Quadrivalent 3 years and older 10/04/2018    Influenza Quadrivalent Preservative Free 3 years and older IM 10/09/2021    Influenza, injectable, quadrivalent, preservative free 0.5 mL 10/03/2019, 03/15/2024    Influenza, recombinant, quadrivalent,injectable, preservative free 09/25/2020, 10/21/2022    Influenza, seasonal, injectable 10/27/2006, 11/26/2010, 10/20/2011, 10/10/2012, 10/08/2015, 10/06/2017    Influenza, seasonal, injectable, preservative free 10/15/2016    Pneumococcal Conjugate Vaccine 20-valent (Pcv20), Polysace 09/13/2022    Tdap 02/06/2015    Zoster Vaccine Recombinant 08/12/2019, 10/14/2019     Objective   /92   Pulse 100   Ht 5' 8.31\" (1.735 m)   Wt 88.5 kg (195 lb)   SpO2 98%   BMI 29.38 kg/m²     Physical Exam  Constitutional:       General: He is not in acute distress.     Appearance: He is not toxic-appearing.     Cardiovascular:      Heart sounds: No murmur heard.     No gallop.   Pulmonary:      Effort: No respiratory distress.      Breath sounds: No wheezing or rales.     Psychiatric:         Mood and Affect: Mood normal.         Behavior: Behavior normal.              [1]   Past Medical History:  Diagnosis Date    COVID-19 03/08/2023    Dermatitis, " contact     last assessed.....13    resolved...14     Diverticulitis of colon     last assessed...10/10/12   resolved....14    Hematuria     last assessed...1/20/15  resolved.....2/6/15    Hyperlipidemia     Hypertension     unspecified type......last assessed...18    Rotator cuff tendinitis     unspecified laterality    last assessed....13   resolved.....14      Sebaceous cyst     last assessed.....14   resolved....2/6/15    Swelling of wrist joint     right......last assessed....2/6/15    resolved......4/6/15   [2]   Past Surgical History:  Procedure Laterality Date    COLONOSCOPY      complete    [facility] LAPAROSCOPY SURG RPR INITIAL INGUINAL HERNIA Left 10/5/2023    Procedure: REPAIR HERNIA INGUINAL  W/ ROBOT WITH MESH;  Surgeon: Rodney Martinez MD;  Location: [facility];  Service: General   [3]   Family History  Problem Relation Name Age of Onset    Brain cancer Mother      Colon cancer Mother      Heart attack Father          acute /  at 52     Colon cancer Brother      Hypertension Brother     [4]   Social History  Tobacco Use    Smoking status: Never     Passive exposure: Never    Smokeless tobacco: Never   Vaping Use    Vaping status: Never Used   Substance and Sexual Activity    Alcohol use: Yes     Alcohol/week: 2.0 standard drinks of alcohol     Types: 2 Cans of beer per week     Comment: social - rare    Drug use: No    Sexual activity: Yes     Partners: Female   [5]   No current outpatient medications on file prior to visit.

## 2025-07-09 ENCOUNTER — OFFICE VISIT (OUTPATIENT)
Dept: FAMILY MEDICINE CLINIC | Facility: CLINIC | Age: 65
End: 2025-07-09
Payer: COMMERCIAL

## 2025-07-09 VITALS
RESPIRATION RATE: 16 BRPM | BODY MASS INDEX: 27.94 KG/M2 | HEIGHT: 70 IN | TEMPERATURE: 97.8 F | WEIGHT: 195.2 LBS | HEART RATE: 82 BPM | OXYGEN SATURATION: 96 % | SYSTOLIC BLOOD PRESSURE: 135 MMHG | DIASTOLIC BLOOD PRESSURE: 80 MMHG

## 2025-07-09 DIAGNOSIS — F40.240 CLAUSTROPHOBIA: ICD-10-CM

## 2025-07-09 DIAGNOSIS — R41.89 COGNITIVE IMPAIRMENT: ICD-10-CM

## 2025-07-09 DIAGNOSIS — E55.9 VITAMIN D DEFICIENCY: ICD-10-CM

## 2025-07-09 DIAGNOSIS — I10 ESSENTIAL HYPERTENSION: ICD-10-CM

## 2025-07-09 DIAGNOSIS — Z00.00 ANNUAL PHYSICAL EXAM: Primary | ICD-10-CM

## 2025-07-09 DIAGNOSIS — R73.03 PREDIABETES: ICD-10-CM

## 2025-07-09 DIAGNOSIS — E78.2 MIXED HYPERLIPIDEMIA: ICD-10-CM

## 2025-07-09 DIAGNOSIS — E53.8 B12 DEFICIENCY: ICD-10-CM

## 2025-07-09 PROBLEM — F32.2 SEVERE MAJOR DEPRESSIVE DISORDER (HCC): Status: ACTIVE | Noted: 2025-07-09

## 2025-07-09 PROBLEM — F32.2 SEVERE MAJOR DEPRESSIVE DISORDER (HCC): Status: RESOLVED | Noted: 2025-07-09 | Resolved: 2025-07-09

## 2025-07-09 PROCEDURE — 99214 OFFICE O/P EST MOD 30 MIN: CPT | Performed by: INTERNAL MEDICINE

## 2025-07-09 PROCEDURE — 99396 PREV VISIT EST AGE 40-64: CPT | Performed by: INTERNAL MEDICINE

## 2025-07-09 RX ORDER — ALPRAZOLAM 0.5 MG
TABLET ORAL
Qty: 2 TABLET | Refills: 0 | Status: SHIPPED | OUTPATIENT
Start: 2025-07-09

## 2025-07-09 NOTE — PROGRESS NOTES
Name: Paresh Sanchez      : 1960      MRN: [id number]  Encounter Provider: Charles Crocker MD  Encounter Date: 2025   Encounter department: [facility]    Assessment & Plan  Cognitive impairment  Patient scored  low on Mini-Mental state exam.  Reports worsening of the memory and stuttering.  His blood work was overall normal except of low B12, will start supplements.  Will send to neuropsych for further evaluation, will order MRI of the brain for neuro quant.  Orders:    MRI brain NeuroQuant wo contrast; Future    Ambulatory Referral to Neuropsychology; Future    Claustrophobia  Patient is going for MRI, he will use Xanax 30 minutes before the test.  Orders:    ALPRAZolam (XANAX) 0.5 mg tablet; Take 1 pill 30 minutes before MRI.    Annual physical exam         Essential hypertension  Blood pressure is overall       Prediabetes  Hemoglobin A1c got slightly worse on the last blood work, he will be more compliant with a low-carb diet.       Mixed hyperlipidemia  He is LDL went up, looks like he was not very compliant with his low-carb diet.  Hold off on therapy as for now.  Recheck again in 3 to 6 months.       Vitamin D deficiency  Start vitamin D supplements 5000 units daily.       B12 deficiency  Start B12 supplements 10,000 mcg daily.            History of Present Illness     Patient came today for annual checkup and to follow-up on his chronic medical problems.  He is vital signs are very acceptable.  He is up-to-date on his colonoscopy  Agreed for PSA.   up-to-date on his vaccinations.  He does not smoke, he is physically active.      Review of Systems   Constitutional:  Negative for chills and fever.   Respiratory:  Negative for cough, shortness of breath and wheezing.    Cardiovascular:  Negative for chest pain, palpitations and leg swelling.   Psychiatric/Behavioral:  Positive for decreased concentration. Negative for agitation, behavioral problems, confusion,  "dysphoric mood and self-injury. The patient is not nervous/anxious.         Decreased memory, stuttering     Past Medical History[1]  Past Surgical History[2]  Family History[3]  Social History[4]  Medications[5]  Allergies   Allergen Reactions    Celecoxib Syncope     Immunization History   Administered Date(s) Administered    COVID-19 PFIZER VACCINE 0.3 ML IM 03/18/2021, 04/09/2021, 12/11/2021    INFLUENZA 10/09/2021, 10/21/2022    Influenza Quadrivalent 3 years and older 10/04/2018    Influenza Quadrivalent Preservative Free 3 years and older IM 10/09/2021    Influenza, injectable, quadrivalent, preservative free 0.5 mL 10/03/2019, 03/15/2024    Influenza, recombinant, quadrivalent,injectable, preservative free 09/25/2020, 10/21/2022    Influenza, seasonal, injectable 10/27/2006, 11/26/2010, 10/20/2011, 10/10/2012, 10/08/2015, 10/06/2017    Influenza, seasonal, injectable, preservative free 10/15/2016    Pneumococcal Conjugate Vaccine 20-valent (Pcv20), Polysace 09/13/2022    Tdap 02/06/2015    Zoster Vaccine Recombinant 08/12/2019, 10/14/2019     Objective   /80 (BP Location: Left arm, Patient Position: Sitting, Cuff Size: Large)   Pulse 82   Temp 97.8 °F (36.6 °C) (Tympanic)   Resp 16   Ht 5' 10\" (1.778 m)   Wt 88.5 kg (195 lb 3.2 oz)   SpO2 96%   BMI 28.01 kg/m²     Physical Exam  Constitutional:       General: He is not in acute distress.     Appearance: He is not ill-appearing or toxic-appearing.     Cardiovascular:      Heart sounds: No murmur heard.     No gallop.   Pulmonary:      Effort: No respiratory distress.      Breath sounds: No wheezing or rales.     Neurological:      Cranial Nerves: No cranial nerve deficit.      Motor: No weakness.      Gait: Gait normal.     Psychiatric:         Mood and Affect: Mood normal.         Behavior: Behavior normal.              [1]   Past Medical History:  Diagnosis Date    COVID-19 03/08/2023    Dermatitis, contact     last assessed.....9/26/13    " resolved...14     Diverticulitis of colon     last assessed...10/10/12   resolved....14    Hematuria     last assessed...1/20/15  resolved.....2/6/15    Hyperlipidemia     Hypertension     unspecified type......last assessed...18    Rotator cuff tendinitis     unspecified laterality    last assessed....13   resolved.....14      Sebaceous cyst     last assessed.....14   resolved....2/6/15    Swelling of wrist joint     right......last assessed....2/6/15    resolved......4/6/15   [2]   Past Surgical History:  Procedure Laterality Date    COLONOSCOPY      complete    [place] LAPAROSCOPY SURG RPR INITIAL INGUINAL HERNIA Left 10/5/2023    Procedure: REPAIR HERNIA INGUINAL  W/ ROBOT WITH MESH;  Surgeon: Rodney Martinez MD;  Location: [facility];  Service: General   [3]   Family History  Problem Relation Name Age of Onset    Brain cancer Mother      Colon cancer Mother      Heart attack Father          acute /  at 52     Colon cancer Brother      Hypertension Brother     [4]   Social History  Tobacco Use    Smoking status: Never     Passive exposure: Never    Smokeless tobacco: Never   Vaping Use    Vaping status: Never Used   Substance and Sexual Activity    Alcohol use: Yes     Alcohol/week: 2.0 standard drinks of alcohol     Types: 2 Cans of beer per week     Comment: social - rare    Drug use: No    Sexual activity: Yes     Partners: Female   [5]   Current Outpatient Medications on File Prior to Visit   Medication Sig    amLODIPine (NORVASC) 10 mg tablet Take 1 tablet (10 mg total) by mouth daily    gabapentin (NEURONTIN) 300 mg capsule Take 1 capsule (300 mg total) by mouth daily at bedtime

## 2025-07-09 NOTE — ASSESSMENT & PLAN NOTE
Hemoglobin A1c got slightly worse on the last blood work, he will be more compliant with a low-carb diet.

## 2025-07-09 NOTE — ASSESSMENT & PLAN NOTE
He is LDL went up, looks like he was not very compliant with his low-carb diet.  Hold off on therapy as for now.  Recheck again in 3 to 6 months.

## 2025-07-09 NOTE — ASSESSMENT & PLAN NOTE
Patient scored  low on Mini-Mental state exam.  Reports worsening of the memory and stuttering.  His blood work was overall normal except of low B12, will start supplements.  Will send to neuropsych for further evaluation, will order MRI of the brain for neuro quant.  Orders:    MRI brain NeuroQuant wo contrast; Future    Ambulatory Referral to Neuropsychology; Future

## 2025-07-09 NOTE — PATIENT INSTRUCTIONS
Please start 10,000 mcg of vitamin B12 daily.     Please cut back on your carbs as your cholesterol went up and your sugar went up.    Start vitamin D3 supplements 5000 units every day.    Please schedule MRI and follow-up with neuropsych for memory problem.

## 2025-08-04 ENCOUNTER — HOSPITAL ENCOUNTER (OUTPATIENT)
Facility: MEDICAL CENTER | Age: 65
Discharge: HOME | End: 2025-08-04
Attending: INTERNAL MEDICINE
Payer: COMMERCIAL

## 2025-08-04 DIAGNOSIS — R41.89 COGNITIVE IMPAIRMENT: ICD-10-CM

## 2025-08-04 PROCEDURE — 70551 MRI BRAIN STEM W/O DYE: CPT

## 2025-08-11 ENCOUNTER — TELEPHONE (OUTPATIENT)
Age: 65
End: 2025-08-11

## 2025-08-12 ENCOUNTER — TELEPHONE (OUTPATIENT)
Dept: FAMILY MEDICINE CLINIC | Facility: CLINIC | Age: 65
End: 2025-08-12

## 2025-08-14 ENCOUNTER — OFFICE VISIT (OUTPATIENT)
Dept: NEUROLOGY | Facility: CLINIC | Age: 65
End: 2025-08-14
Payer: COMMERCIAL

## (undated) DEVICE — TRAY FOLEY 16FR URIMETER SURESTEP

## (undated) DEVICE — GLOVE SRG BIOGEL ECLIPSE 7.5

## (undated) DEVICE — NEEDLE HYPO 22G X 1-1/2 IN

## (undated) DEVICE — SUT VLOC 90 3-0 V-20 9IN VLOCM0644

## (undated) DEVICE — INTENDED FOR TISSUE SEPARATION, AND OTHER PROCEDURES THAT REQUIRE A SHARP SURGICAL BLADE TO PUNCTURE OR CUT.: Brand: BARD-PARKER SAFETY BLADES SIZE 11, STERILE

## (undated) DEVICE — DISPOSABLE OR TOWEL: Brand: CARDINAL HEALTH

## (undated) DEVICE — CANNULA SEAL

## (undated) DEVICE — ALLENTOWN LAP CHOLE APP PACK: Brand: CARDINAL HEALTH

## (undated) DEVICE — MAYO STAND COVER: Brand: CONVERTORS

## (undated) DEVICE — ELECTRO LUBE IS A SINGLE PATIENT USE DEVICE THAT IS INTENDED TO BE USED ON ELECTROSURGICAL ELECTRODES TO REDUCE STICKING.: Brand: KEY SURGICAL ELECTRO LUBE

## (undated) DEVICE — PROGRASP FORCEPS: Brand: ENDOWRIST

## (undated) DEVICE — SCD SEQUENTIAL COMPRESSION COMFORT SLEEVE MEDIUM KNEE LENGTH: Brand: KENDALL SCD

## (undated) DEVICE — SUT MONOCRYL 4-0 PS-2 27 IN Y426H

## (undated) DEVICE — [HIGH FLOW INSUFFLATOR,  DO NOT USE IF PACKAGE IS DAMAGED,  KEEP DRY,  KEEP AWAY FROM SUNLIGHT,  PROTECT FROM HEAT AND RADIOACTIVE SOURCES.]: Brand: PNEUMOSURE

## (undated) DEVICE — SUT VICRYL 2-0 SH 27 IN UNDYED J417H

## (undated) DEVICE — GLOVE INDICATOR PI UNDERGLOVE SZ 8 BLUE

## (undated) DEVICE — ARM DRAPE

## (undated) DEVICE — GLOVE INDICATOR PI UNDERGLOVE SZ 6.5 BLUE

## (undated) DEVICE — GLOVE SRG BIOGEL 6.5

## (undated) DEVICE — PENCIL ELECTROSURG E-Z CLEAN -0035H

## (undated) DEVICE — COLUMN DRAPE

## (undated) DEVICE — PMI DISPOSABLE PUNCTURE CLOSURE DEVICE / SUTURE GRASPER: Brand: PMI

## (undated) DEVICE — DRAPE EQUIPMENT RF WAND

## (undated) DEVICE — ASTOUND STANDARD SURGICAL GOWN, XL: Brand: CONVERTORS

## (undated) DEVICE — ADHESIVE SKIN HIGH VISCOSITY EXOFIN 1ML

## (undated) DEVICE — TIBURON LAPAROSCOPIC ABDOMINAL DRAPE: Brand: CONVERTORS

## (undated) DEVICE — MEGA SUTURECUT ND: Brand: ENDOWRIST

## (undated) DEVICE — SUT VICRYL 0 UR-6 27 IN J603H

## (undated) DEVICE — BLADELESS OBTURATOR: Brand: WECK VISTA

## (undated) DEVICE — MONOPOLAR CURVED SCISSORS: Brand: ENDOWRIST

## (undated) DEVICE — CHLORAPREP HI-LITE 26ML ORANGE

## (undated) DEVICE — DRAPE SHEET X-LG

## (undated) DEVICE — TIP COVER ACCESSORY